# Patient Record
Sex: MALE | Race: WHITE | NOT HISPANIC OR LATINO | Employment: UNEMPLOYED | URBAN - METROPOLITAN AREA
[De-identification: names, ages, dates, MRNs, and addresses within clinical notes are randomized per-mention and may not be internally consistent; named-entity substitution may affect disease eponyms.]

---

## 2017-02-16 ENCOUNTER — HOSPITAL ENCOUNTER (EMERGENCY)
Facility: HOSPITAL | Age: 27
Discharge: HOME | End: 2017-02-16
Attending: EMERGENCY MEDICINE
Payer: MEDICAID

## 2017-02-16 VITALS
DIASTOLIC BLOOD PRESSURE: 76 MMHG | HEIGHT: 68 IN | SYSTOLIC BLOOD PRESSURE: 131 MMHG | TEMPERATURE: 98 F | OXYGEN SATURATION: 98 % | BODY MASS INDEX: 24.71 KG/M2 | HEART RATE: 69 BPM | WEIGHT: 163 LBS | RESPIRATION RATE: 16 BRPM

## 2017-02-16 PROCEDURE — 23655 CLTX SHO DSLC W/MNPJ W/ANES: CPT

## 2017-02-16 PROCEDURE — 63600175 PHARM REV CODE 636 W HCPCS: Performed by: EMERGENCY MEDICINE

## 2017-02-16 PROCEDURE — 99284 EMERGENCY DEPT VISIT MOD MDM: CPT | Mod: 25

## 2017-02-16 PROCEDURE — 96375 TX/PRO/DX INJ NEW DRUG ADDON: CPT

## 2017-02-16 PROCEDURE — 96374 THER/PROPH/DIAG INJ IV PUSH: CPT | Mod: 59

## 2017-02-16 PROCEDURE — 27000221 HC OXYGEN, UP TO 24 HOURS

## 2017-02-16 PROCEDURE — 94770 HC EXHALED C02 TEST: CPT

## 2017-02-16 PROCEDURE — 23650 CLTX SHO DSLC W/MNPJ WO ANES: CPT

## 2017-02-16 RX ORDER — HYDROCODONE BITARTRATE AND ACETAMINOPHEN 5; 325 MG/1; MG/1
1-2 TABLET ORAL EVERY 4 HOURS PRN
Qty: 20 TABLET | Refills: 0 | Status: SHIPPED | OUTPATIENT
Start: 2017-02-16 | End: 2017-02-26

## 2017-02-16 RX ORDER — PROPOFOL 10 MG/ML
75 VIAL (ML) INTRAVENOUS ONCE
Status: COMPLETED | OUTPATIENT
Start: 2017-02-16 | End: 2017-02-16

## 2017-02-16 RX ORDER — MORPHINE SULFATE 10 MG/ML
10 INJECTION INTRAMUSCULAR; INTRAVENOUS; SUBCUTANEOUS
Status: COMPLETED | OUTPATIENT
Start: 2017-02-16 | End: 2017-02-16

## 2017-02-16 RX ADMIN — MORPHINE SULFATE 10 MG: 10 INJECTION, SOLUTION INTRAMUSCULAR; INTRAVENOUS at 07:02

## 2017-02-16 RX ADMIN — PROPOFOL 75 MG: 10 INJECTION, EMULSION INTRAVENOUS at 07:02

## 2017-02-16 NOTE — ED AVS SNAPSHOT
[facility] [address] [place] [address]               Kwan Palacio   2017  7:01 PM   ED    Description:  Male : 1990   Department:  [facility]           Your Care was Coordinated By:     Provider Role From To    Kt Celis III, MD Attending Provider [id number] --      Reason for Visit     Dislocation           Diagnoses this Visit        Comments    Dislocation    -  Primary       ED Disposition     None           To Do List           Follow-up Information     Follow up with Jasmeet Sierra MD In 4 days.    Specialty:  Orthopedic Surgery    Contact information:    [address]  [place] [address]  [phone]         These Medications        Disp Refills Start End    hydrocodone-acetaminophen 5-325mg (NORCO) 5-325 mg per tablet 20 tablet 0 2017    Take 1-2 tablets by mouth every 4 (four) hours as needed for Pain. - Oral      [facility]     [facility] Nurse Care [facility] -  Assistance  Registered nurses in the [facility] provide clinical advisement, health education, appointment booking, and other advisory services.  Call for this free service at [phone].             Medications           Message regarding Medications     Verify the changes and/or additions to your medication regime listed below are the same as discussed with your clinician today.  If any of these changes or additions are incorrect, please notify your healthcare provider.        START taking these NEW medications        Refills    hydrocodone-acetaminophen 5-325mg (NORCO) 5-325 mg per tablet 0    Sig: Take 1-2 tablets by mouth every 4 (four) hours as needed for Pain.    Class: Print    Route: Oral      These medications were administered today        Dose Freq    morphine injection 10 mg 10 mg ED 1 Time    Sig: Inject 1 mL (10 mg total) into the vein ED 1 Time.    Class: Normal    Route:  "Intravenous    propofol (DIPRIVAN) 10 mg/mL IVP 75 mg Once    Sig: Inject 7.5 mLs (75 mg total) into the vein once.    Class: Normal    Route: Intravenous           Verify that the below list of medications is an accurate representation of the medications you are currently taking.  If none reported, the list may be blank. If incorrect, please contact your healthcare provider. Carry this list with you in case of emergency.           Current Medications     hydrocodone-acetaminophen 5-325mg (NORCO) 5-325 mg per tablet Take 1-2 tablets by mouth every 4 (four) hours as needed for Pain.           Clinical Reference Information           Your Vitals Were     BP Pulse Temp Resp Height Weight    145/67 (BP Location: Right arm, Patient Position: Sitting) 93 98.1 °F (36.7 °C) (Oral) 16 5' 8" (1.727 m) 73.9 kg (163 lb)    SpO2 BMI             100% 24.78 kg/m2         Allergies as of 2/16/2017     No Known Allergies      Immunizations Administered on Date of Encounter - 2/16/2017     None      ED Micro, Lab, POCT     None      ED Imaging Orders     Start Ordered       Status Ordering Provider    02/16/17 2005 02/16/17 2004  X-Ray Shoulder 2 or More Views Left  1 time imaging      Ordered     02/16/17 1916 02/16/17 1916  X-Ray Shoulder 2 or More Views Left  1 time imaging      In process       Discharge References/Attachments     DISLOCATION: SHOULDER (REDUCED) (ENGLISH)       [facility] complies with applicable Federal civil rights laws and does not discriminate on the basis of race, color, national origin, age, disability, or sex.        Language Assistance Services     ATTENTION: Language assistance services are available, free of charge. Please call [phone].      ATENCIÓN: Si habla español, tiene a leung disposición servicios gratuitos de asistencia lingüística. Llame al [phone].     CHÚ Ý: N?u b?n nói Ti?ng Vi?t, có các d?ch v? h? tr? ngôn ng? mi?n phí dành cho b?n. G?i s? [phone].      "

## 2017-02-17 NOTE — ED PROVIDER NOTES
"3Encounter Date: 2/16/2017    SCRIBE #1 NOTE: I, Barbara Nielsen , am scribing for, and in the presence of,  Dr. Celis. I have scribed the entire note.       History     Chief Complaint   Patient presents with    Dislocation     pt was wrestling and dislocated left shoulder; hx of dislocation     Review of patient's allergies indicates:  No Known Allergies  HPI Comments:     02/16/2017  7:15 PM     Chief Complaint: Possible L shoulder dislocation       The patient is a 26 y.o. Male with a PMHx of L should pain who is presenting with the acute onset of L shoulder injury that occurred just PTA. The pt reports that he was "wrestling with his girlfriend" and she "accidentally pulled it right out of the socket". He states that the effected arm is extremely tender to touch, has limited ROM, and is "not able to be moved". No decrease in sensation. No other comments or concerns. Pt confirms hx of dislocated L shoulder. No past surgical hx on file. Pt has hx if drug abuse including opiates and amphetamines.             The history is provided by the patient and medical records.     Past Medical History   Diagnosis Date    Dislocation of shoulder, left, closed      No past medical history pertinent negatives.  History reviewed. No pertinent past surgical history.  History reviewed. No pertinent family history.  Social History   Substance Use Topics    Smoking status: Never Smoker    Smokeless tobacco: None    Alcohol use Yes      Comment: occas     Review of Systems   Constitutional: Negative for fever.   HENT: Negative for sore throat.    Eyes: Negative for visual disturbance.   Respiratory: Negative for shortness of breath.    Cardiovascular: Negative for chest pain.   Gastrointestinal: Negative for nausea.   Genitourinary: Negative for dysuria.   Musculoskeletal: Positive for arthralgias (L shoulder pain). Negative for back pain.   Skin: Negative for rash.   Neurological: Negative for weakness.   Hematological: Does " not bruise/bleed easily.   Psychiatric/Behavioral: Negative for confusion.       Physical Exam   Initial Vitals   BP Pulse Resp Temp SpO2   02/16/17 1851 02/16/17 1851 02/16/17 1851 02/16/17 1851 02/16/17 1851   145/67 93 16 98.1 °F (36.7 °C) 97 %     Physical Exam    Nursing note and vitals reviewed.  Constitutional: He appears well-developed and well-nourished.   HENT:   Head: Normocephalic and atraumatic.   Mouth/Throat: Oropharynx is clear and moist.   Eyes: EOM are normal. Pupils are equal, round, and reactive to light.   Neck: Normal range of motion. Neck supple.   Cardiovascular: Normal rate, regular rhythm, normal heart sounds and intact distal pulses. Exam reveals no gallop and no friction rub.    No murmur heard.  2+ radial pulses to the L side    Pulmonary/Chest: Breath sounds normal. He has no wheezes. He has no rhonchi. He has no rales. He exhibits no tenderness.   Musculoskeletal: He exhibits tenderness. He exhibits no edema.   Limited ROM secondary to pain. L shoulder is TTP and the pt is holding the arm in 80 degrees abduction. No fullness or depression noted. No swelling or ecchymosis.      Lymphadenopathy:     He has no cervical adenopathy.   Neurological: He is alert and oriented to person, place, and time. No sensory deficit.   Normal light touch sensation noted to L arm.    Skin: Skin is warm and dry.   Capillary refill is less than 3 seconds.    Psychiatric: He has a normal mood and affect.         ED Course   Orthopedic Injury  Date/Time: 2/17/2017 3:05 AM  Authorized by: KIMBERLY GENTILE III   Performed by: KIMBERLY GENTILE III  Injury location: shoulder  Location details: left shoulder  Injury type: dislocation  Dislocation type: posterior  Chronicity: recurrent  Pre-procedure distal perfusion: normal  Pre-procedure neurological function: normal  Pre-procedure neurovascular assessment: neurovascularly intact  Pre-procedure range of motion: reduced  Sedation:  Patient sedated:  yes  Sedatives: propofol  Analgesia: morphine    Manipulation performed: yes  Reduction method: traction and counter traction  Reduction successful: yes  X-ray confirmed reduction: yes  Immobilization: sling  Post-procedure neurovascular assessment: post-procedure neurovascularly intact  Post-procedure distal perfusion: normal  Post-procedure neurological function: normal  Post-procedure range of motion: normal  Patient tolerance: Patient tolerated the procedure well with no immediate complications        Labs Reviewed - No data to display          Medical Decision Making:   ED Management:  Kwan Palacio is a 26 y.o. male with a history of recurrent shoulder dislocations presents with abrupt onset of left shoulder pain with limited range of motion after wrestling.  X-rays independently interpreted by me demonstrate a dislocation.  Under deep sedation with propofol and analgesia with morphine shoulder successfully reduced.  Postreduction x-rays independently interpreted by me demonstrate a successfully reduce shoulder.  He is placed in a sling and given hydrocodone for pain with referral to orthopedic surgery.            Scribe Attestation:   Scribe #1: I performed the above scribed service and the documentation accurately describes the services I performed. I attest to the accuracy of the note.    Attending Attestation:           Physician Attestation for Scribe:  Physician Attestation Statement for Scribe #1: I, Dr. Celis , reviewed documentation, as scribed by Barbara Nielsen  in my presence, and it is both accurate and complete.                 ED Course     Clinical Impression:   There were no encounter diagnoses.          Kt Celis III, MD  02/17/17 0306

## 2017-02-17 NOTE — ED NOTES
Pt awake and talking.  Very appropriately interactive with staff.  VSWNL.  Will continue to monitor

## 2017-02-17 NOTE — PROGRESS NOTES
Pt has ETCO2 monitoring device in usage. NC-2L was in use.  Dr. Celis at bedside for conscious sedation procedure.  Ambu bag and mask at \[facility]\"" on standby.

## 2017-02-17 NOTE — ED NOTES
Pt awake and oriented x4.  Friend remains at bedside.  ERP confirms shoulder placement.  Shoulder immobilizer in place.  Pt instructed on use and care.  ERP at bedside

## 2017-03-25 ENCOUNTER — HOSPITAL ENCOUNTER (EMERGENCY)
Facility: HOSPITAL | Age: 27
Discharge: HOME | End: 2017-03-25
Attending: EMERGENCY MEDICINE
Payer: MEDICAID

## 2017-03-25 VITALS
HEART RATE: 86 BPM | TEMPERATURE: 98 F | BODY MASS INDEX: 24.71 KG/M2 | RESPIRATION RATE: 16 BRPM | WEIGHT: 163 LBS | OXYGEN SATURATION: 99 % | DIASTOLIC BLOOD PRESSURE: 65 MMHG | SYSTOLIC BLOOD PRESSURE: 139 MMHG | HEIGHT: 68 IN

## 2017-03-25 DIAGNOSIS — M24.412 RECURRENT SHOULDER DISLOCATION, LEFT: Primary | ICD-10-CM

## 2017-03-25 PROCEDURE — 27000221 HC OXYGEN, UP TO 24 HOURS

## 2017-03-25 PROCEDURE — 99284 EMERGENCY DEPT VISIT MOD MDM: CPT | Mod: 25

## 2017-03-25 PROCEDURE — 96374 THER/PROPH/DIAG INJ IV PUSH: CPT

## 2017-03-25 PROCEDURE — 94770 HC EXHALED C02 TEST: CPT | Mod: 59

## 2017-03-25 PROCEDURE — 63600175 PHARM REV CODE 636 W HCPCS: Performed by: EMERGENCY MEDICINE

## 2017-03-25 PROCEDURE — 96375 TX/PRO/DX INJ NEW DRUG ADDON: CPT

## 2017-03-25 PROCEDURE — 23650 CLTX SHO DSLC W/MNPJ WO ANES: CPT

## 2017-03-25 PROCEDURE — 23655 CLTX SHO DSLC W/MNPJ W/ANES: CPT

## 2017-03-25 RX ORDER — KETOROLAC TROMETHAMINE 30 MG/ML
30 INJECTION, SOLUTION INTRAMUSCULAR; INTRAVENOUS
Status: COMPLETED | OUTPATIENT
Start: 2017-03-25 | End: 2017-03-25

## 2017-03-25 RX ORDER — FENTANYL CITRATE 50 UG/ML
50 INJECTION, SOLUTION INTRAMUSCULAR; INTRAVENOUS
Status: COMPLETED | OUTPATIENT
Start: 2017-03-25 | End: 2017-03-25

## 2017-03-25 RX ORDER — PROPOFOL 10 MG/ML
100 VIAL (ML) INTRAVENOUS ONCE
Status: COMPLETED | OUTPATIENT
Start: 2017-03-25 | End: 2017-03-25

## 2017-03-25 RX ORDER — MELOXICAM 7.5 MG/1
TABLET ORAL
Qty: 20 TABLET | Refills: 0 | Status: ON HOLD | OUTPATIENT
Start: 2017-03-25 | End: 2017-08-08 | Stop reason: HOSPADM

## 2017-03-25 RX ADMIN — PROPOFOL 100 MG: 10 INJECTION, EMULSION INTRAVENOUS at 08:03

## 2017-03-25 RX ADMIN — KETOROLAC TROMETHAMINE 30 MG: 30 INJECTION, SOLUTION INTRAMUSCULAR at 07:03

## 2017-03-25 RX ADMIN — FENTANYL CITRATE 50 MCG: 50 INJECTION, SOLUTION INTRAMUSCULAR; INTRAVENOUS at 07:03

## 2017-03-25 NOTE — ED AVS SNAPSHOT
[facility] [address]  [place] [address]               Kwan Mayfieldk   3/25/2017  7:06 AM   ED    Description:  Male : 1990   Department:  [facility]           Your Care was Coordinated By:     Provider Role From To    Sulaiman Damian MD Attending Provider 17 [phone] --      Reason for Visit     Shoulder Pain           Diagnoses this Visit        Comments    Recurrent shoulder dislocation, left    -  Primary       ED Disposition     None           To Do List           Follow-up Information     Please follow up.    Why:  Follow-up with your orthopedist next week.       These Medications        Disp Refills Start End    meloxicam (MOBIC) 7.5 MG tablet 20 tablet 0 3/25/2017     1-2 tablets once daily for pain      [facility]     [facility] Care [facility] -  Assistance  Registered nurses in the [facility] provide clinical advisement, health education, appointment booking, and other advisory services.  Call for this free service at [phone].             Medications           Message regarding Medications     Verify the changes and/or additions to your medication regime listed below are the same as discussed with your clinician today.  If any of these changes or additions are incorrect, please notify your healthcare provider.        START taking these NEW medications        Refills    meloxicam (MOBIC) 7.5 MG tablet 0    Si-2 tablets once daily for pain    Class: Print      These medications were administered today        Dose Freq    propofol (DIPRIVAN) 10 mg/mL  mg Once    Sig: Inject 10 mLs (100 mg total) into the vein once.    Class: Normal    Route: Intravenous    ketorolac injection 30 mg 30 mg ED 1 Time    Sig: Inject 30 mg into the vein ED 1 Time.    Class: Normal    Route: Intravenous    fentaNYL 50 mcg/mL injection 50 mcg 50 mcg ED 1 Time    Sig: Inject 1 mL (50 mcg total) into  "the vein ED 1 Time.    Class: Normal    Route: Intravenous           Verify that the below list of medications is an accurate representation of the medications you are currently taking.  If none reported, the list may be blank. If incorrect, please contact your healthcare provider. Carry this list with you in case of emergency.           Current Medications     meloxicam (MOBIC) 7.5 MG tablet 1-2 tablets once daily for pain           Clinical Reference Information           Your Vitals Were     BP Pulse Temp Resp Height Weight    142/67 95 97.5 °F (36.4 °C) (Oral) 14 5' 8" (1.727 m) 73.9 kg (163 lb)    SpO2 BMI             100% 24.78 kg/m2         Allergies as of 3/25/2017     No Known Allergies      Immunizations Administered on Date of Encounter - 3/25/2017     None      ED Micro, Lab, POCT     None      ED Imaging Orders     Start Ordered       Status Ordering Provider    03/25/17 0710 03/25/17 0709  X-Ray Shoulder 2 or More Views Left  1 time imaging      Final result       Discharge References/Attachments     DISLOCATION: SHOULDER (REDUCED) (ENGLISH)    SEDATION, PROCEDURAL (ADULT) (ENGLISH)       [facility] complies with applicable Federal civil rights laws and does not discriminate on the basis of race, color, national origin, age, disability, or sex.        Language Assistance Services     ATTENTION: Language assistance services are available, free of charge. Please call [phone].      ATENCIÓN: Si habla español, tiene a leung disposición servicios gratuitos de asistencia lingüística. Llame al [phone].     CHÚ Ý: N?u b?n nói Ti?ng Vi?t, có các d?ch v? h? tr? ngôn ng? mi?n phí dành cho b?n. G?i s? [phone].        "

## 2017-03-25 NOTE — ED PROVIDER NOTES
"Encounter Date: 3/25/2017       History     Chief Complaint   Patient presents with    Shoulder Pain     States L shoulder dislocation when wrestling with wife this am.     Review of patient's allergies indicates:  No Known Allergies  HPI Comments: Pt is a 25 y/o male who presents to the ER for evaluation left shoulder pain that occurred while he was wrestling with his girlfriend just prior to arrival.  Patient is unable to move his shoulder but is able to wiggle his fingers and move his elbow.  He has no complaints of headache neck pain or any other issues.  Interestingly this is the second time this has happened to this unfortunate gentleman over the past 2 months while "wrestling with his girlfriend".  Patient does have a prior history of opiate abuse and I saw him approximately 2-3 years ago when he presented to the emergency room under the influence and was arrested by his .     Past Medical History:   Diagnosis Date    Dislocation of shoulder, left, closed      History reviewed. No pertinent surgical history.  History reviewed. No pertinent family history.  Social History   Substance Use Topics    Smoking status: Never Smoker    Smokeless tobacco: None    Alcohol use Yes      Comment: occas     Review of Systems   Constitutional: Negative for fever.   Respiratory: Negative for shortness of breath.    Cardiovascular: Negative for chest pain.   Gastrointestinal: Negative for abdominal pain.   Musculoskeletal: Positive for arthralgias and joint swelling.   Skin: Negative for pallor, rash and wound.   Neurological: Negative for weakness, numbness and headaches.   Psychiatric/Behavioral: Negative for agitation and confusion.       Physical Exam   Initial Vitals   BP Pulse Resp Temp SpO2   03/25/17 0707 03/25/17 0707 03/25/17 0707 03/25/17 0707 03/25/17 0707   167/90 126 18 97.5 °F (36.4 °C) 100 %     Physical Exam    Vitals reviewed.  Constitutional: He appears well-developed and well-nourished. " No distress.   HENT:   Head: Normocephalic and atraumatic.   Mouth/Throat: Oropharynx is clear and moist.   Eyes: Conjunctivae and EOM are normal. Pupils are equal, round, and reactive to light.   Cardiovascular: Normal rate, regular rhythm, normal heart sounds and intact distal pulses. Exam reveals no gallop and no friction rub.    No murmur heard.  Pulmonary/Chest: Breath sounds normal. He has no wheezes. He has no rhonchi. He has no rales.   Abdominal: Soft. There is no tenderness.   Musculoskeletal: He exhibits tenderness (over the left shoulder w/ obvious deformity of the left shoulder).   Neurological: He is alert and oriented to person, place, and time.   Sensation is intact to the hand and fingers         ED Course   Procedural Sedation  Date/Time: 3/25/2017 8:11 AM  Performed by: AAMIR NÚÑEZ  Authorized by: AAMIR NÚÑEZ   ASA Class: Class 1 - Heathy patient. No medical history.   Mallampati Score: Class 1 - Visualization of the soft palate, fauces, uvula, and anterior/posterior pillars.   Equipment: on cardiac monitor., on BP monitor., on CO2 monitor., on supplemental oxygen., suction available. and airway equipment available.   Sedation type: deep sedation  (See MAR for exact dosages of medications).  Sedatives: propofol  Vitals: Vital signs were monitored during sedation.    Orthopedic Injury  Date/Time: 3/25/2017 8:12 AM  Authorized by: AAMIR NÚÑEZ   Performed by: AAMIR NÚÑEZ  Consent Done: Not Needed  Injury location: shoulder  Location details: left shoulder  Injury type: dislocation  Dislocation type: anterior  Hill-Sachs deformity: no  Chronicity: recurrent  Pre-procedure distal perfusion: normal  Pre-procedure neurological function: normal  Pre-procedure neurovascular assessment: neurovascularly intact  Pre-procedure range of motion: reduced  Local anesthesia used: no    Anesthesia:  Local anesthesia used: no  Sedation:  Patient sedated: yes  Vitals: Vital signs were monitored  during sedation.    Manipulation performed: yes  Reduction method: Lukasz maneuver  Reduction successful: yes  X-ray confirmed reduction: yes  Immobilization: sling  Complications: No  Post-procedure neurovascular assessment: post-procedure neurovascularly intact  Post-procedure distal perfusion: normal  Post-procedure neurological function: normal  Post-procedure range of motion: normal        Labs Reviewed - No data to display          Medical Decision Making:   Patient's shoulder has been reduced.  He was placed in an immobilizer.  I will prescribe Motrin for pain.  He needs to follow-up with orthopedist.                   ED Course     Clinical Impression:   The encounter diagnosis was Recurrent shoulder dislocation, left.          Sulaiman Damian MD  03/25/17 0916

## 2017-03-25 NOTE — ED NOTES
States dislocated L shoulder when wrestling with girlfriend. Able to move L hand and fingers, palp L radial pulse.

## 2017-03-25 NOTE — ED NOTES
L shoulder relocated per  without problems, vss. Awake , drowsy. Palp L radial pulse, w&d, color good. Sling and swath applied.

## 2017-07-19 ENCOUNTER — HOSPITAL ENCOUNTER (INPATIENT)
Facility: HOSPITAL | Age: 27
LOS: 20 days | Discharge: NURSING, REHAB OR LONG-TERM CARE | DRG: 220 | End: 2017-08-08
Attending: INTERNAL MEDICINE | Admitting: INTERNAL MEDICINE
Payer: MEDICAID

## 2017-07-19 DIAGNOSIS — Z95.2 S/P AVR (AORTIC VALVE REPLACEMENT): ICD-10-CM

## 2017-07-19 DIAGNOSIS — I44.0 AV BLOCK, 1ST DEGREE: ICD-10-CM

## 2017-07-19 DIAGNOSIS — Z98.890 HISTORY OF OPEN HEART SURGERY: ICD-10-CM

## 2017-07-19 DIAGNOSIS — I49.9 ARRHYTHMIA: ICD-10-CM

## 2017-07-19 DIAGNOSIS — I33.0 ACUTE BACTERIAL ENDOCARDITIS: ICD-10-CM

## 2017-07-19 DIAGNOSIS — R73.9 STRESS HYPERGLYCEMIA: ICD-10-CM

## 2017-07-19 DIAGNOSIS — I33.0 SUBACUTE BACTERIAL ENDOCARDITIS: ICD-10-CM

## 2017-07-19 DIAGNOSIS — Z98.890 S/P MVR (MITRAL VALVE REPAIR): ICD-10-CM

## 2017-07-19 DIAGNOSIS — F19.10 IV DRUG ABUSE: ICD-10-CM

## 2017-07-19 DIAGNOSIS — I38 ENDOCARDITIS: ICD-10-CM

## 2017-07-19 DIAGNOSIS — R04.2 HEMOPTYSIS: ICD-10-CM

## 2017-07-19 PROCEDURE — 20000000 HC ICU ROOM

## 2017-07-20 PROBLEM — R04.2 HEMOPTYSIS: Status: ACTIVE | Noted: 2017-07-20

## 2017-07-20 LAB
ALBUMIN SERPL BCP-MCNC: 2.9 G/DL
ALBUMIN SERPL BCP-MCNC: 2.9 G/DL
ALP SERPL-CCNC: 84 U/L
ALP SERPL-CCNC: 84 U/L
ALT SERPL W/O P-5'-P-CCNC: 35 U/L
ALT SERPL W/O P-5'-P-CCNC: 35 U/L
ANION GAP SERPL CALC-SCNC: 11 MMOL/L
AORTIC VALVE REGURGITATION: ABNORMAL
APTT BLDCRRT: 23.2 SEC
AST SERPL-CCNC: 32 U/L
AST SERPL-CCNC: 32 U/L
BASOPHILS # BLD AUTO: 0.03 K/UL
BASOPHILS # BLD AUTO: 0.03 K/UL
BASOPHILS NFR BLD: 0.2 %
BASOPHILS NFR BLD: 0.2 %
BILIRUB SERPL-MCNC: 0.5 MG/DL
BILIRUB SERPL-MCNC: 0.5 MG/DL
BNP SERPL-MCNC: 143 PG/ML
BUN SERPL-MCNC: 16 MG/DL
CALCIUM SERPL-MCNC: 8.6 MG/DL
CHLORIDE SERPL-SCNC: 100 MMOL/L
CO2 SERPL-SCNC: 29 MMOL/L
CREAT SERPL-MCNC: 1.1 MG/DL
DIFFERENTIAL METHOD: ABNORMAL
DIFFERENTIAL METHOD: ABNORMAL
EOSINOPHIL # BLD AUTO: 0.1 K/UL
EOSINOPHIL # BLD AUTO: 0.1 K/UL
EOSINOPHIL NFR BLD: 0.8 %
EOSINOPHIL NFR BLD: 0.8 %
ERYTHROCYTE [DISTWIDTH] IN BLOOD BY AUTOMATED COUNT: 17.2 %
ERYTHROCYTE [DISTWIDTH] IN BLOOD BY AUTOMATED COUNT: 17.2 %
EST. GFR  (AFRICAN AMERICAN): >60 ML/MIN/1.73 M^2
EST. GFR  (NON AFRICAN AMERICAN): >60 ML/MIN/1.73 M^2
GLUCOSE SERPL-MCNC: 104 MG/DL
HCT VFR BLD AUTO: 33.4 %
HCT VFR BLD AUTO: 33.4 %
HGB BLD-MCNC: 11 G/DL
HGB BLD-MCNC: 11 G/DL
INR PPP: 1.1
LACTATE SERPL-SCNC: 1.6 MMOL/L
LYMPHOCYTES # BLD AUTO: 3 K/UL
LYMPHOCYTES # BLD AUTO: 3 K/UL
LYMPHOCYTES NFR BLD: 24.1 %
LYMPHOCYTES NFR BLD: 24.1 %
MAGNESIUM SERPL-MCNC: 1.8 MG/DL
MAGNESIUM SERPL-MCNC: 1.8 MG/DL
MCH RBC QN AUTO: 28 PG
MCH RBC QN AUTO: 28 PG
MCHC RBC AUTO-ENTMCNC: 32.9 G/DL
MCHC RBC AUTO-ENTMCNC: 32.9 G/DL
MCV RBC AUTO: 85 FL
MCV RBC AUTO: 85 FL
MITRAL VALVE REGURGITATION: ABNORMAL
MONOCYTES # BLD AUTO: 0.7 K/UL
MONOCYTES # BLD AUTO: 0.7 K/UL
MONOCYTES NFR BLD: 5.3 %
MONOCYTES NFR BLD: 5.3 %
NEUTROPHILS # BLD AUTO: 8.6 K/UL
NEUTROPHILS # BLD AUTO: 8.6 K/UL
NEUTROPHILS NFR BLD: 69.3 %
NEUTROPHILS NFR BLD: 69.3 %
PHOSPHATE SERPL-MCNC: 4.7 MG/DL
PHOSPHATE SERPL-MCNC: 4.7 MG/DL
PLATELET # BLD AUTO: 293 K/UL
PLATELET # BLD AUTO: 293 K/UL
PMV BLD AUTO: 8.8 FL
PMV BLD AUTO: 8.8 FL
POTASSIUM SERPL-SCNC: 3.9 MMOL/L
PROT SERPL-MCNC: 6.5 G/DL
PROT SERPL-MCNC: 6.5 G/DL
PROTHROMBIN TIME: 11.4 SEC
RBC # BLD AUTO: 3.93 M/UL
RBC # BLD AUTO: 3.93 M/UL
RETIRED EF AND QEF - SEE NOTES: 60 (ref 55–65)
SODIUM SERPL-SCNC: 140 MMOL/L
VANCOMYCIN SERPL-MCNC: 21.3 UG/ML
WBC # BLD AUTO: 12.47 K/UL
WBC # BLD AUTO: 12.47 K/UL

## 2017-07-20 PROCEDURE — 99222 1ST HOSP IP/OBS MODERATE 55: CPT | Mod: ,,, | Performed by: INTERNAL MEDICINE

## 2017-07-20 PROCEDURE — 83605 ASSAY OF LACTIC ACID: CPT

## 2017-07-20 PROCEDURE — 80053 COMPREHEN METABOLIC PANEL: CPT

## 2017-07-20 PROCEDURE — 25000003 PHARM REV CODE 250: Performed by: INTERNAL MEDICINE

## 2017-07-20 PROCEDURE — 83735 ASSAY OF MAGNESIUM: CPT

## 2017-07-20 PROCEDURE — 87040 BLOOD CULTURE FOR BACTERIA: CPT

## 2017-07-20 PROCEDURE — 93010 ELECTROCARDIOGRAM REPORT: CPT | Mod: ,,, | Performed by: INTERNAL MEDICINE

## 2017-07-20 PROCEDURE — 99233 SBSQ HOSP IP/OBS HIGH 50: CPT | Mod: ,,, | Performed by: INTERNAL MEDICINE

## 2017-07-20 PROCEDURE — 93306 TTE W/DOPPLER COMPLETE: CPT | Mod: 26,,, | Performed by: INTERNAL MEDICINE

## 2017-07-20 PROCEDURE — 20000000 HC ICU ROOM

## 2017-07-20 PROCEDURE — 85730 THROMBOPLASTIN TIME PARTIAL: CPT

## 2017-07-20 PROCEDURE — 85025 COMPLETE CBC W/AUTO DIFF WBC: CPT

## 2017-07-20 PROCEDURE — 85610 PROTHROMBIN TIME: CPT

## 2017-07-20 PROCEDURE — 80202 ASSAY OF VANCOMYCIN: CPT

## 2017-07-20 PROCEDURE — 25000003 PHARM REV CODE 250: Performed by: STUDENT IN AN ORGANIZED HEALTH CARE EDUCATION/TRAINING PROGRAM

## 2017-07-20 PROCEDURE — 84100 ASSAY OF PHOSPHORUS: CPT

## 2017-07-20 PROCEDURE — 27000221 HC OXYGEN, UP TO 24 HOURS

## 2017-07-20 PROCEDURE — 93306 TTE W/DOPPLER COMPLETE: CPT

## 2017-07-20 PROCEDURE — 94761 N-INVAS EAR/PLS OXIMETRY MLT: CPT

## 2017-07-20 PROCEDURE — 83880 ASSAY OF NATRIURETIC PEPTIDE: CPT

## 2017-07-20 PROCEDURE — 63600175 PHARM REV CODE 636 W HCPCS: Performed by: INTERNAL MEDICINE

## 2017-07-20 RX ORDER — ALPRAZOLAM 0.5 MG/1
0.5 TABLET ORAL NIGHTLY PRN
Status: DISCONTINUED | OUTPATIENT
Start: 2017-07-20 | End: 2017-07-21

## 2017-07-20 RX ORDER — ALPRAZOLAM 0.5 MG/1
0.5 TABLET ORAL 2 TIMES DAILY PRN
Status: DISCONTINUED | OUTPATIENT
Start: 2017-07-20 | End: 2017-07-21

## 2017-07-20 RX ORDER — PROMETHAZINE HYDROCHLORIDE AND CODEINE PHOSPHATE 6.25; 1 MG/5ML; MG/5ML
5 SOLUTION ORAL EVERY 6 HOURS PRN
Status: DISCONTINUED | OUTPATIENT
Start: 2017-07-20 | End: 2017-07-20

## 2017-07-20 RX ORDER — IBUPROFEN 200 MG
1 TABLET ORAL DAILY
Status: DISCONTINUED | OUTPATIENT
Start: 2017-07-20 | End: 2017-07-21

## 2017-07-20 RX ORDER — HYDROCODONE BITARTRATE AND ACETAMINOPHEN 5; 325 MG/1; MG/1
1 TABLET ORAL EVERY 8 HOURS PRN
Status: DISCONTINUED | OUTPATIENT
Start: 2017-07-20 | End: 2017-07-21

## 2017-07-20 RX ORDER — HYDROCODONE BITARTRATE AND ACETAMINOPHEN 5; 325 MG/1; MG/1
1 TABLET ORAL ONCE
Status: COMPLETED | OUTPATIENT
Start: 2017-07-20 | End: 2017-07-20

## 2017-07-20 RX ORDER — ACETAMINOPHEN 325 MG/1
650 TABLET ORAL EVERY 6 HOURS PRN
Status: DISCONTINUED | OUTPATIENT
Start: 2017-07-20 | End: 2017-07-21

## 2017-07-20 RX ORDER — ONDANSETRON 2 MG/ML
4 INJECTION INTRAMUSCULAR; INTRAVENOUS EVERY 12 HOURS PRN
Status: DISCONTINUED | OUTPATIENT
Start: 2017-07-20 | End: 2017-07-21

## 2017-07-20 RX ORDER — FUROSEMIDE 10 MG/ML
20 INJECTION INTRAMUSCULAR; INTRAVENOUS 2 TIMES DAILY
Status: DISCONTINUED | OUTPATIENT
Start: 2017-07-20 | End: 2017-07-21

## 2017-07-20 RX ORDER — HEPARIN SODIUM 5000 [USP'U]/ML
5000 INJECTION, SOLUTION INTRAVENOUS; SUBCUTANEOUS EVERY 8 HOURS
Status: DISCONTINUED | OUTPATIENT
Start: 2017-07-20 | End: 2017-07-21

## 2017-07-20 RX ORDER — HYDROCODONE BITARTRATE AND ACETAMINOPHEN 5; 325 MG/1; MG/1
1 TABLET ORAL EVERY 8 HOURS PRN
Status: DISCONTINUED | OUTPATIENT
Start: 2017-07-20 | End: 2017-07-20

## 2017-07-20 RX ORDER — SODIUM CHLORIDE 0.9 % (FLUSH) 0.9 %
3 SYRINGE (ML) INJECTION EVERY 8 HOURS
Status: DISCONTINUED | OUTPATIENT
Start: 2017-07-20 | End: 2017-07-21

## 2017-07-20 RX ORDER — PANTOPRAZOLE SODIUM 40 MG/1
40 TABLET, DELAYED RELEASE ORAL DAILY
Status: DISCONTINUED | OUTPATIENT
Start: 2017-07-20 | End: 2017-07-21

## 2017-07-20 RX ADMIN — PROMETHAZINE HYDROCHLORIDE AND CODEINE PHOSPHATE 5 ML: 6.25; 1 SYRUP ORAL at 10:07

## 2017-07-20 RX ADMIN — FUROSEMIDE 20 MG: 10 INJECTION, SOLUTION INTRAVENOUS at 08:07

## 2017-07-20 RX ADMIN — NICOTINE 1 PATCH: 21 PATCH, EXTENDED RELEASE TRANSDERMAL at 08:07

## 2017-07-20 RX ADMIN — ACETAMINOPHEN 650 MG: 325 TABLET ORAL at 08:07

## 2017-07-20 RX ADMIN — Medication 3 ML: at 02:07

## 2017-07-20 RX ADMIN — CEFTRIAXONE 2 G: 2 INJECTION, SOLUTION INTRAVENOUS at 04:07

## 2017-07-20 RX ADMIN — Medication 3 ML: at 06:07

## 2017-07-20 RX ADMIN — Medication 5 ML: at 02:07

## 2017-07-20 RX ADMIN — HYDROCODONE BITARTRATE AND ACETAMINOPHEN 1 TABLET: 5; 325 TABLET ORAL at 03:07

## 2017-07-20 RX ADMIN — CEFTRIAXONE 2 G: 2 INJECTION, SOLUTION INTRAVENOUS at 03:07

## 2017-07-20 RX ADMIN — ALPRAZOLAM 0.5 MG: 0.5 TABLET ORAL at 09:07

## 2017-07-20 RX ADMIN — ALPRAZOLAM 0.5 MG: 0.5 TABLET ORAL at 02:07

## 2017-07-20 RX ADMIN — FUROSEMIDE 20 MG: 10 INJECTION, SOLUTION INTRAVENOUS at 05:07

## 2017-07-20 RX ADMIN — PROMETHAZINE HYDROCHLORIDE AND CODEINE PHOSPHATE 5 ML: 6.25; 1 SYRUP ORAL at 04:07

## 2017-07-20 RX ADMIN — PANTOPRAZOLE SODIUM 40 MG: 40 TABLET, DELAYED RELEASE ORAL at 08:07

## 2017-07-20 NOTE — ASSESSMENT & PLAN NOTE
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  Reports it was at its worst yesterday and has improved since.  He denies any leon blood clots though this is somewhat uncertain.  Differential is broad; we only know of aortic and mitral endocarditis - no known right sided disease - if that were the case then would have to worry about septic emboli.  Primary team concerned for pulmonary edema.  Could also be DAH from infective endocarditis vs ARDS in the setting of infection.    - His hemoptysis is improving - his CXR is impressive for patchy airspace disease; a CT may better characterize the parenchymal lesions  - Treatment for his underlying condition (Infective endocarditis) would be the primary treatment for several possible etiologies on the differential - being evaluated for surgical valve replacement  - Checking a BNP, diuresis may help resolve his hemoptysis as well  - If it progresses or fails to improve; CTA to localize the bleed site for IR embolization is not unreasonable - we may also consider bronchoscopy at that time to help confirm etiology of hemoptysis

## 2017-07-20 NOTE — PROGRESS NOTES
Pt experiencing runs of SVT on telemetry q10 min for 1 hour. BP stable, HR ~120, runs last for < 5 seconds, pt asymptomatic. Dr Lam notified.

## 2017-07-20 NOTE — HPI
26yoM who presents from OSH with strep angionosis endocarditis (aortic and mitral).  Reports that he had ~2 weeks of sx prior to being admitted.  Complains of a persistent cough and pain in his chest and ribs with coughing.  States that he is bringing up sputum that is yellow and mixed with blood for the past 2 days.  Denies any leon blood clots.  States he continues to feel unwell because of the pain all over his body.  No nausea, vomiting.  Still feels like his breathing is somewhat shallow.

## 2017-07-20 NOTE — PROGRESS NOTES
Pt arrived to [facility] room 3089 transfer from [facility] via EMS, VSS, on 2L O2 n.c, Cards MD at bedside. No skin breakdown noted, multiple tattoos and piercings. Lab work sent, blood cultures sent, EKG 12 lead done, WCTM and follow POC

## 2017-07-20 NOTE — PLAN OF CARE
07/20/17 1610   Discharge Assessment   Assessment Type Discharge Planning Assessment   Assessment information obtained from? Medical Record   Expected Length of Stay (days) 5   Communicated expected length of stay with patient/caregiver yes   Prior to hospitilization cognitive status: Alert/Oriented   Prior to hospitalization functional status: Independent   Current cognitive status: Alert/Oriented   Current Functional Status: Independent   Arrived From [facility] hospital  (Transferred for OSH for CTs eval for endocarditis)   Lives With significant other   Able to Return to Prior Arrangements unable to determine at this time (comments)   Is patient able to care for self after discharge? Unable to determine at this time (comments)   How many people do you have in your home that can help with your care after discharge? 1   Who are your caregiver(s) and their phone number(s)? Dena Sylvester [phone] S.O.   Patient's perception of discharge disposition home health   Readmission Within The Last 30 Days no previous admission in last 30 days   Patient currently being followed by outpatient case management? No   Patient currently receives home health services? No   Does the patient currently use HME? No   Patient currently receives private duty nursing? No   Patient currently receives any other outside agency services? No   Equipment Currently Used at Home none   Do you have any problems affording any of your prescribed medications? TBD   Is the patient taking medications as prescribed? yes   Do you have any financial concerns preventing you from receiving the healthcare you need? (unknown)   Does the patient have transportation to healthcare appointments? Yes   Transportation Available car;family or friend will provide   On Dialysis? No   Does the patient receive services at the Coumadin Clinic? No   Are there any open cases? No   Discharge Plan A Home Health   Discharge Plan B Rehab   Patient/Family In Agreement  With Plan unable to assess     Patient is admitted for CTS eval for bacterial endocarditis. The patient may require long term IV ABX and PICC line to be place. H/O of drug use and takes methadone. May require eval from addiction [facility] prior to discharge to home with central line access. Will continue to follow for discharge needs. HH vs Inpatient Rehab vs SNF.

## 2017-07-20 NOTE — CONSULTS
[facility]  Infectious Disease  Consult Note    Patient Name: Kwan Palacio  MRN: [id number]  Admission Date: 7/19/2017  Hospital Length of Stay: 1 days  Attending Physician: Torey Gonzalez MD  Primary Care Provider: Primary Doctor No     Isolation Status: No active isolations    Inpatient consult to Infectious Diseases  Consult performed by: ATA CORNEJO  Consult ordered by: DICKSON HENRY          Subjective:     Principal Problem: Endocarditis    HPI: Pt is a 27 yo male with a recently diagnosed bicuspid aortic valve who presents to [facility] as a transfer from [facility] for streptococcus angionosis endocarditis. Symptoms began 2 months ago with fevers and malaise. Went to the ED multiple times before being admitted 2 weeks ago. Blood cultures from 07/04/2017 positive for streptococcus angionosis; TTE & DOUG demonstrated vegetations & newly diagnosed bicuspid aortic valve. Patient was treated with gentamicin & rocephin. Yesterday, while in hospital, the patient developed a productive cough with hemoptysis and transferred to our hospital for evaluation by CT surgery.    Today, pt complains of continued hemoptysis. He has generalized pain worst in his throat & chest, particularly his ribs; rib pain rated 8/10 and painful with movement & inspiration. He felt fevers/chills last night, no documented fever. He denies headaches, blurry vision, n/v, or rash. He has had a 30 lb wt loss over the last two months. Positive risk factors for endocarditis include a history of drug abuse (denies Iv), tattoos & congenital heart disease (bicuspid aortic valve, recently diagnosed at [facility] Denies history of immunesuppresion. CXR demonstrate parenchymal opacities consistent with pulmonary edema, BL PNA or pulmonary hemorrhage. 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 No leukocytosis, blood cultures NGTD.  Currently on rocephin.       Past Medical History:   Diagnosis Date    Dislocation of shoulder, left, closed        History reviewed. No pertinent surgical history.    Review of patient's allergies indicates:  No Known Allergies    Medications:  Prescriptions Prior to Admission   Medication Sig    meloxicam (MOBIC) 7.5 MG tablet 1-2 tablets once daily for pain     Antibiotics     Start     Stop Route Frequency Ordered    07/20/17 0345  cefTRIAXone (ROCEPHIN) 2 g in dextrose 5 % 50 mL IVPB      -- IV Every 12 hours (non-standard times) 07/20/17 0246        Antifungals     None        Antivirals     None             There is no immunization history on file for this patient.    Family History     None        Social History     Social History    Marital status: Significant Other     Spouse name: N/A    Number of children: N/A    Years of education: N/A     Social History Main Topics    Smoking status: Never Smoker    Smokeless tobacco: Current User     Types: Snuff    Alcohol use Yes      Comment: occas    Drug use:      Types: Methamphetamines, MDMA (Ecstacy), Benzodiazepines    Sexual activity: Yes     Partners: Female     Other Topics Concern    None     Social History Narrative    None     Review of Systems   Constitutional: Positive for chills and fever.   HENT: Positive for sore throat.    Eyes: Negative for visual disturbance.   Respiratory: Positive for cough (hemoptysis). Negative for shortness of breath.    Cardiovascular: Positive for chest pain (localized to ribs).   Gastrointestinal: Positive for abdominal pain. Negative for diarrhea, nausea and vomiting.   Genitourinary: Negative for difficulty urinating and dysuria.   Musculoskeletal:        Generalized aches & pain   Skin: Negative for color change and rash.   Neurological: Negative for headaches.     Objective:     Vital Signs (Most Recent):  Temp: 98.5 °F (36.9 °C) (07/20/17  1100)  Pulse: 96 (07/20/17 1100)  Resp: (!) 26 (07/20/17 1100)  BP: (!) 117/57 (07/20/17 1100)  SpO2: 100 % (07/20/17 1100) Vital Signs (24h Range):  Temp:  [98.5 °F (36.9 °C)-99.1 °F (37.3 °C)] 98.5 °F (36.9 °C)  Pulse:  [84-99] 96  Resp:  [16-40] 26  SpO2:  [92 %-100 %] 100 %  BP: ()/(51-66) 117/57     Weight: 68.3 kg (150 lb 9.2 oz)  Body mass index is 22.89 kg/m².    Estimated Creatinine Clearance: 99.2 mL/min (based on Cr of 1.1).    Physical Exam   Constitutional: He is oriented to person, place, and time. He appears distressed (in pain).   Thin-appearing   HENT:   Head: Normocephalic.   Eyes:   Negative woodson spots   Cardiovascular: Regular rhythm.    Murmur (systolic) heard.  HR 96   Pulmonary/Chest: Effort normal.   Decreased breath sounds   Abdominal: There is tenderness (lower abdomen, worse in LLQ).   splenomegaly   Musculoskeletal: He exhibits no edema or tenderness.   Neurological: He is alert and oriented to person, place, and time.   Skin: Skin is warm and dry. He is not diaphoretic.   Psychiatric: He has a normal mood and affect. His behavior is normal.       Significant Labs:   Blood Culture:   Recent Labs  Lab 07/20/17  0130 07/20/17  0200   LABBLOO No Growth to date No Growth to date     CBC:   Recent Labs  Lab 07/20/17  0100   WBC 12.47  12.47   HGB 11.0*  11.0*   HCT 33.4*  33.4*     293     CMP:   Recent Labs  Lab 07/20/17  0100     140  140   K 3.9  3.9  3.9     100  100   CO2 29  29  29     104  104   BUN 16  16  16   CREATININE 1.1  1.1  1.1   CALCIUM 8.6*  8.6*  8.6*   PROT 6.5  6.5   ALBUMIN 2.9*  2.9*   BILITOT 0.5  0.5   ALKPHOS 84  84   AST 32  32   ALT 35  35   ANIONGAP 11  11  11   EGFRNONAA >60.0  >60.0  >60.0       Significant Imaging: I have reviewed all pertinent imaging results/findings within the past 24 hours.      Assessment/Plan:     * Endocarditis    27 yo male with streptococcus anginosis endocarditis likely  complicated by heart failure  -con't IV ceftriaxone 2g Q12H  -Blood cultures NGTD  -pending CT chest & abdomen-will follow  -follow up with CT surgery recs (waiting for DOUG from [facility])          Thank you for your consult.    Nora Hess MD  Infectious Disease  [facility]

## 2017-07-20 NOTE — ASSESSMENT & PLAN NOTE
Treatment per primary team and  ID recommendations w/ evaluation for source control with surgical valve replacement and abscess drainagae

## 2017-07-20 NOTE — H&P
"      Cardiology H&P    Chief complaint: transfer from OSH     HPI:   Mr Palacio is a 26 year old male who was transferred in from OSH for surgical evaluation for endocarditis. He states he has felt poorly for the last 2 months with fevers and generalized malaise. He went to the ED several times and was told he had a respiratory virus. He then was finally admitted about 2 weeks ago. Today he states he was transferred because he had been "coughing up blood." He was transferred with limited records but the nurse did call the OSH to find out he has been on micafungin, Vanc, Gentamicin, and Rocephin abx therapy. In addition he had been getting a cough suppressant and prn xanax for anxiety. He also had a DOUG, but we did have a copy of the study or report. He states that his only complaint currently is that he has a sore throat from coughing.    He states his only home medication is methadone.     The patient states he does have some type of congenital cardiac defect that was discovered recently. Again, I have record of this. He states it is a "missing valve."    ROS:    Constitution: Negative for fever or chills. Negative for weight loss or gain.   HENT: Positive for throat pain  Eyes: Negative for blurred or double vision.   Cardiovascular: See above  Pulmonary: Negative for SOB. Positive for cough.   Gastrointestinal: Negative for abdominal pain. Negative for nausea/vomiting. Negative for diarrhea.   : Negative for dysuria.   Neurological: Negative for focal weakness or sensory changes.  PMH:     Past Medical History:   Diagnosis Date    Dislocation of shoulder, left, closed      History reviewed. No pertinent surgical history.  Allergies:   Review of patient's allergies indicates:  No Known Allergies    Medications:     PTA Medications   Medication Sig    meloxicam (MOBIC) 7.5 MG tablet 1-2 tablets once daily for pain       Inpatient Medications   Continuous Infusions:   Scheduled Meds:   cefTRIAXone (ROCEPHIN) IVPB "  2 g Intravenous Q12H    furosemide  20 mg Intravenous BID    heparin (porcine)  5,000 Units Subcutaneous Q8H    nicotine  1 patch Transdermal Daily    pantoprazole  40 mg Oral Daily    sodium chloride 0.9%  3 mL Intravenous Q8H     PRN Meds:(Magic mouthwash) 1:1:1 Benadryl 12.5mg/5ml liq, aluminum & magnesium hydroxide-simehticone (Maalox), lidocaine viscous 2%, alprazolam, alprazolam, ondansetron, promethazine-codeine 6.25-10 mg/5 ml     Social History:     Social History   Substance Use Topics    Smoking status: Never Smoker    Smokeless tobacco: Current User     Types: Snuff    Alcohol use Yes      Comment: occas   admits to drug usage: meth, ectasy but never any IVDA - review of old records shows that he has admitted to injecting heroin in the past.     Family History:   History reviewed. No pertinent family history.  Physical Exam:     Vitals:  Temp:  [98.9 °F (37.2 °C)-99.1 °F (37.3 °C)]   Pulse:  [84-96]   Resp:  [16-40]   BP: ()/(51-66)   SpO2:  [92 %-100 %]  I/O's:    Intake/Output Summary (Last 24 hours) at 07/20/17 0744  Last data filed at 07/20/17 0149   Gross per 24 hour   Intake                0 ml   Output              460 ml   Net             -460 ml        Constitutional: NAD  HEENT: Sclera anicteric, PERRLA, EOMI  Neck: No JVD, no carotid bruits  CV: RRR, holosystolic murmur, normal S1/S2  Pulm: CTAB, no wheezes, rales, or ronchi  GI: Abdomen soft, NTND, +BS  Extremities: No LE edema  Skin: No ecchymosis, erythema, or ulcers  Psych: AOx3, appropriate affect  Neuro: CNII-XII intact, no focal deficits    Labs:       Recent Labs  Lab 07/20/17  0100     140  140   K 3.9  3.9  3.9     100  100   CO2 29  29  29   BUN 16  16  16   CREATININE 1.1  1.1  1.1   ANIONGAP 11  11  11       Recent Labs  Lab 07/20/17  0100   AST 32  32   ALT 35  35   ALKPHOS 84  84   BILITOT 0.5  0.5   ALBUMIN 2.9*  2.9*       Recent Labs  Lab 07/20/17  0100   CALCIUM 8.6*  8.6*   8.6*   MG 1.8  1.8   PHOS 4.7*  4.7*     No results for input(s): TROPONINI, BNP in the last 168 hours.  No results for input(s): NITRITE, LEUKOCYTESUR, WBCUA, BACTERIA in the last 168 hours.    Invalid input(s): EPITHELIALCE, COGRCA    Estimated Creatinine Clearance: 99.2 mL/min (based on Cr of 1.1).   Recent Labs  Lab 07/20/17  0100   WBC 12.47  12.47   HGB 11.0*  11.0*   HCT 33.4*  33.4*     293   GRAN 69.3  69.3  8.6*  8.6*       Recent Labs  Lab 07/20/17  0100   INR 1.1       Recent Labs  Lab 07/20/17  0100   LACTATE 1.6     No results found for: CHOL, HDL, LDLCALC, TRIG  No results found for: HGBA1C  No results found for: TSH, T5GGLTT       Micro: No culture data  Blood Cultures  No results found for: LABBLOO  Urine Cultures  No results found for: LABURIN  Sputum Cultures  No components found for: LOWERRESPIRA  Imaging:   CXR: pending    No results found for: EF  Assessment and Plan:   Bacterial endocarditis  Strep angiosis bacteremia  Drug abuse    Plan:  - ordered blood cultures (will likely not show anything given his been on abx)  - continued Rocephin and ordered random Vanc level. Will hold gent and Micafungin at this time pending ID consult  - consult cardiothoracic surgery  - attempt to obtain records from OSH to better understand prior imaging and treatment  - ordered TTE, will likely need DOUG at some point  - ordered cxr given hx of cough  - prn magic mouthwash for throat pain  - continued most other medications from med list obtained from nurse.    Santiago Rao MD, PGY- IV  Cardiology Fellow  Pager : [phone]  7/20/2017 7:44 AM

## 2017-07-20 NOTE — MEDICAL/APP STUDENT
"General Infectious Diseases: Consult Note    Consult Requested By: Torey Gonzalez MD    Reason for Consult: Suspected Bacterial Endocarditis      IMPRESSION:    25yo male with hx of drug abuse presenting from outside hospital with suspected sub-acute bacterial endocarditis and confirmed Strep anginosus bacteremia.  WBC stable at 12.47. Afebrile.     PLAN:  - Blood cx redrawn on 7/20/17. Will f/u.  - CXR f/u with CT chest. Will add CT abdo to r/o possible other sources of bacterial seeding.   - Will continue Ceftriaxone. No other additional agents recommended at this time.  - Will f/u records from [facility]       Thanks for the consult. ID will continue to follow.     Dena Heath MS 4    SUBJECTIVE:     History of Present Illness:  Patient is a 26 y.o. male with a hx of drug abuse transferred from [facility] on 6/19/17 for CTS eval. Patient has 2 mo hx of fevers, chills, and malaise. Mr. Palacio presented to the ED several times during these two months and states he was told he had "a cold" each time. Pt denies N/V. Endorses night sweats and 30lb weight loss over 2 months. Admitted to [facility] 2 weeks ago. Had first and only episode of hemoptysis yesterday. States he had a DOUG in which they discovered a "missing valve" but has no copy of report or records.    Admission records limited. Nurse called and was told pt was on Micafungin, Vanc, Gent, and Rocephin. Positive blood cx for Strep anginosus. Sensitivities include Amp, Ceftriaxone, Clinda, Penicillin, and Vanc. Resistant to erythromycin. Also on cough suppressent and Xanax for anxiety.    Pt admits to meth and ecstasy use but denies any IVDU. Pt states he had "bad batch of dope" around the time his symptoms began. He denies any drug use since this time.     [facility] stopped Vanc, Gent, and Micafungin. Random vanc level drawn.     Past Medical History:  Past Medical History:   Diagnosis Date    " Dislocation of shoulder, left, closed        Past Surgical History:  Sinus sx at age 13    Family History:  Pt states family all have heart issues, but is unsure of specifics.     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     Allergies:  Review of patient's allergies indicates:  No Known Allergies     Pertinent Medications:  Antibiotics     Start     Stop Route Frequency Ordered    07/20/17 0345  cefTRIAXone (ROCEPHIN) 2 g in dextrose 5 % 50 mL IVPB      -- IV Every 12 hours (non-standard times) 07/20/17 0246            Review of Symptoms:  Constitutional:Endorses fevers, weight loss, chills, or weakness.  Eyes: Denies changes in vision.  ENT: Endorses throat pain   Cardiovascular: Endorses chest pain denies palpitations  Respiratory: Endorses shortness of breath, cough, hemoptysis  GI: Denies nausea/vomitting, hematochezia, melena, abd pain, but endorses decrease in appetite.  : Denies dysuria, incontinence, or hematuria.  Skin/breast: Denies rashes, lumps, lesions, or discharge.  Neurologic: Denies headache, dizziness.  Endocrine: Denies polyuria, polydipsia, heat/cold intolerance.  Hematologic/Lymph: Denies lymphadenopathy, easy bruising or easy bleeding.    OBJECTIVE:     Vital Signs (Most Recent)  Temp: 98.8 °F (37.1 °C) (07/20/17 0700)  Pulse: 99 (07/20/17 1000)  Resp: (!) 27 (07/20/17 1000)  BP: (!) 119/58 (07/20/17 1000)  SpO2: 96 % (07/20/17 1000)    Temperature Range Min/Max (Last 24H):  Temp:  [98.8 °F (37.1 °C)-99.1 °F (37.3 °C)]     Physical Exam:  Gen: Nontoxic, comfortable, no acute distress.  Cachectic.   HEENT: PERRL. No scleral icterus. EOMI intact. OP clear.  Pulmonary: Non labored. Clear to auscultation A/P/L. No wheezing, crackles, or rhonchi.   Cardiac: Grade III holosystolic murmur appreciated loudest in aortic  region. Radiates to back.   Abdomen: Normal bowel sounds. Soft, nontender, nondistended. No rebound or guarding. No hepatomegaly. Mild splenomegaly noted.  Extremities: No clubbing, cyanosis, or peripheral edema. Distal pulses symmetric..  Lymphatics: no preauricular, cervical  LAD.  Musculoskeletal: no joint deformities or effusions.  Neurological:  Alert and oriented.  CN II-XII grossly intact. Gross motor intact x4. Sensation grossly intact.  Skin: No jaundice, rashes, or visible lesions.      Laboratory:  CBC:   Lab Results   Component Value Date    WBC 12.47 07/20/2017    WBC 12.47 07/20/2017    HGB 11.0 (L) 07/20/2017    HGB 11.0 (L) 07/20/2017    HCT 33.4 (L) 07/20/2017    HCT 33.4 (L) 07/20/2017    MCV 85 07/20/2017    MCV 85 07/20/2017     07/20/2017     07/20/2017       BMP:   Recent Labs  Lab 07/20/17  0100     104  104     140  140   K 3.9  3.9  3.9     100  100   CO2 29  29  29   BUN 16  16  16   CREATININE 1.1  1.1  1.1   CALCIUM 8.6*  8.6*  8.6*   MG 1.8  1.8       LFT: Lab Results   Component Value Date    ALT 35 07/20/2017    ALT 35 07/20/2017    AST 32 07/20/2017    AST 32 07/20/2017    ALKPHOS 84 07/20/2017    ALKPHOS 84 07/20/2017    BILITOT 0.5 07/20/2017    BILITOT 0.5 07/20/2017       Microbiology:  Blood cx - 7/20/17- In progress    Diagnostic Results:  CXR - 7/20/17 - Grossly abnormal chest radiograph, demonstrating widespread, essentially symmetric airspace consolidation in both lungs, preferentially affecting the upper lobes.  Multiple etiologies exist, including cardiogenic and noncardiogenic causes of pulmonary edema, extensive bilateral pneumonia, and pulmonary hemorrhage, with clinical correlation essential.    2D Echo c/ color flow doppler - Pathologic findings listed below.  The LV mass index was increased at 134.7 g/m2 consistent with eccentric left ventricular hypertrophy. There are no regional wall motion abnormalities. Left  ventricular systolic function appears normal. Visually   estimated ejection fraction is 60-65%.    Aortic Valve:  The aortic valve is thickened. The aortic valve is bi-leaflet in structure. Additionally, there is severe aortic regurgitation. There is a pressure half time of 200.0 msec. Type 0 bicupsid aortic valve with thickened poasterior leaflet and   nodular denisty on it possibly suggesting vegetation    Mitral Valve:  The mitral valve is thickened. There is severe mitral regurgitation. 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  There are two jets   of MR, one through the perforation and another, central.  ASSESSMENT/PLAN:     Active Hospital Problems    Diagnosis  POA    *Endocarditis [I38]  Yes      Resolved Hospital Problems    Diagnosis Date Resolved POA   No resolved problems to display.       Plan: Please see top of page

## 2017-07-20 NOTE — HPI
Pt is a 27 yo male with a recently diagnosed bicuspid aortic valve who presents to [facility] as a transfer from [facility] for streptococcus angionosis endocarditis. Symptoms began 2 months ago with fevers and malaise. Went to the ED multiple times before being admitted 2 weeks ago. Blood cultures from 07/04/2017 positive for streptococcus angionosis; TTE & DOUG demonstrated vegetations & newly diagnosed bicuspid aortic valve. Patient was treated with gentamicin & rocephin. Yesterday, while in hospital, the patient developed a productive cough with hemoptysis and transferred to our hospital for evaluation by CT surgery.    Today, pt complains of continued hemoptysis. He has generalized pain worst in his throat & chest, particularly his ribs; rib pain rated 8/10 and painful with movement & inspiration. He felt fevers/chills last night, no documented fever. He denies headaches, blurry vision, n/v, or rash. He has had a 30 lb wt loss over the last two months. Positive risk factors for endocarditis include a history of drug abuse (denies Iv) & valvular disease (bicuspid aortic valve, recently diagnosed at [facility].)  CXR demonstrate parenchymal opacities consistent with pulmonary edema, BL PNA or pulmonary hemorrhage. 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 No leukocytosis, blood cultures NGTD.  Currently on rocephin.

## 2017-07-20 NOTE — SUBJECTIVE & OBJECTIVE
Past Medical History:   Diagnosis Date    Dislocation of shoulder, left, closed        History reviewed. No pertinent surgical history.    Review of patient's allergies indicates:  No Known Allergies    Medications:  Prescriptions Prior to Admission   Medication Sig    meloxicam (MOBIC) 7.5 MG tablet 1-2 tablets once daily for pain     Antibiotics     Start     Stop Route Frequency Ordered    07/20/17 0345  cefTRIAXone (ROCEPHIN) 2 g in dextrose 5 % 50 mL IVPB      -- IV Every 12 hours (non-standard times) 07/20/17 0246        Antifungals     None        Antivirals     None             There is no immunization history on file for this patient.    Family History     None        Social History     Social History    Marital status: Significant Other     Spouse name: N/A    Number of children: N/A    Years of education: N/A     Social History Main Topics    Smoking status: Never Smoker    Smokeless tobacco: Current User     Types: Snuff    Alcohol use Yes      Comment: occas    Drug use:      Types: Methamphetamines, MDMA (Ecstacy), Benzodiazepines    Sexual activity: Yes     Partners: Female     Other Topics Concern    None     Social History Narrative    None     Review of Systems   Constitutional: Positive for chills and fever.   HENT: Positive for sore throat.    Eyes: Negative for visual disturbance.   Respiratory: Positive for cough (hemoptysis). Negative for shortness of breath.    Cardiovascular: Positive for chest pain (localized to ribs).   Gastrointestinal: Positive for abdominal pain. Negative for diarrhea, nausea and vomiting.   Genitourinary: Negative for difficulty urinating and dysuria.   Musculoskeletal:        Generalized aches & pain   Skin: Negative for color change and rash.   Neurological: Negative for headaches.     Objective:     Vital Signs (Most Recent):  Temp: 98.5 °F (36.9 °C) (07/20/17 1100)  Pulse: 96 (07/20/17 1100)  Resp: (!) 26 (07/20/17 1100)  BP: (!) 117/57 (07/20/17  1100)  SpO2: 100 % (07/20/17 1100) Vital Signs (24h Range):  Temp:  [98.5 °F (36.9 °C)-99.1 °F (37.3 °C)] 98.5 °F (36.9 °C)  Pulse:  [84-99] 96  Resp:  [16-40] 26  SpO2:  [92 %-100 %] 100 %  BP: ()/(51-66) 117/57     Weight: 68.3 kg (150 lb 9.2 oz)  Body mass index is 22.89 kg/m².    Estimated Creatinine Clearance: 99.2 mL/min (based on Cr of 1.1).    Physical Exam   Constitutional: He is oriented to person, place, and time. He appears distressed (in pain).   Thin-appearing   HENT:   Head: Normocephalic.   Eyes:   Negative woodson spots   Cardiovascular: Regular rhythm.    Murmur (systolic) heard.  HR 96   Pulmonary/Chest: Effort normal.   Decreased breath sounds   Abdominal: There is tenderness (lower abdomen, worse in LLQ).   splenomegaly   Musculoskeletal: He exhibits no edema or tenderness.   Neurological: He is alert and oriented to person, place, and time.   Skin: Skin is warm and dry. He is not diaphoretic.   Psychiatric: He has a normal mood and affect. His behavior is normal.       Significant Labs:   Blood Culture:   Recent Labs  Lab 07/20/17  0130 07/20/17  0200   LABBLOO No Growth to date No Growth to date     CBC:   Recent Labs  Lab 07/20/17  0100   WBC 12.47  12.47   HGB 11.0*  11.0*   HCT 33.4*  33.4*     293     CMP:   Recent Labs  Lab 07/20/17  0100     140  140   K 3.9  3.9  3.9     100  100   CO2 29  29  29     104  104   BUN 16  16  16   CREATININE 1.1  1.1  1.1   CALCIUM 8.6*  8.6*  8.6*   PROT 6.5  6.5   ALBUMIN 2.9*  2.9*   BILITOT 0.5  0.5   ALKPHOS 84  84   AST 32  32   ALT 35  35   ANIONGAP 11  11  11   EGFRNONAA >60.0  >60.0  >60.0       Significant Imaging: I have reviewed all pertinent imaging results/findings within the past 24 hours.

## 2017-07-20 NOTE — CONSULTS
"Consult Note  Cardiothoracic Surgery    Inpatient consult to Cardiothoracic Surgery  Consult performed by: SABINO EDWARD  Consult ordered by: DICKSON HENRY        SUBJECTIVE:     History of Present Illness:  Mr Palacio is a 26 year old male who was transferred in from OSH for surgical evaluation for endocarditis. He states he has felt poorly for the last 2 months with fevers and generalized malaise. He went to the ED several times and was told he had a respiratory virus. He then was finally admitted about 2 weeks ago.     Today he states he was transferred because he had been "coughing up blood." He was transferred with limited records but the nurse did call the OSH to find out he has been on micafungin, Vanc, Gentamicin, and Rocephin abx therapy. In addition he had been getting a cough suppressant and prn xanax for anxiety. He also had a DOUG, but we don't have a copy of the study or report. He states that his only complaint currently is that he has a sore throat from coughing.     He states his only home medication is methadone.      The patient states he does have some type of congenital cardiac defect that was discovered recently. Again, no record of this. He states it is a "missing valve."  [place] [facility] [place] valve.     Scheduled Meds:   cefTRIAXone (ROCEPHIN) IVPB  2 g Intravenous Q12H    furosemide  20 mg Intravenous BID    heparin (porcine)  5,000 Units Subcutaneous Q8H    nicotine  1 patch Transdermal Daily    pantoprazole  40 mg Oral Daily    sodium chloride 0.9%  3 mL Intravenous Q8H     Infusions/Drips:   PRN Meds:(Magic mouthwash) 1:1:1 Benadryl 12.5mg/5ml liq, aluminum & magnesium hydroxide-simehticone (Maalox), lidocaine viscous 2%, alprazolam, alprazolam, ondansetron    Review of patient's allergies indicates:  No Known Allergies    Past Medical History:   Diagnosis Date    Dislocation of shoulder, left, closed      History reviewed. No pertinent surgical history.  History " reviewed. No pertinent family history.  Social History   Substance Use Topics    Smoking status: Never Smoker    Smokeless tobacco: Current User     Types: Snuff    Alcohol use Yes      Comment: occas        Review of Systems:  Review of Systems   Constitutional: Negative for fever and malaise/fatigue.   HENT: Negative for congestion and sore throat.    Eyes: Negative for blurred vision, double vision and discharge.   Respiratory: Positive for cough, shortness of breath.    Cardiovascular: Negative for chest pain, palpitations, claudication, leg swelling and PND.   Gastrointestinal: Negative for abdominal pain, constipation, diarrhea, nausea and vomiting.   Genitourinary: Negative for dysuria, frequency and urgency.   Musculoskeletal: Negative for back pain and myalgias.   Skin: Negative for itching and rash.   Neurological: Negative for dizziness, speech change, seizures, weakness and headaches.   Endo/Heme/Allergies: Does not bruise/bleed easily.   Psychiatric/Behavioral: Negative for depression. The patient is not nervous/anxious.      OBJECTIVE:     Vital Signs (Most Recent)  Temp: 98.5 °F (36.9 °C) (07/20/17 1100)  Pulse: 96 (07/20/17 1100)  Resp: (!) 26 (07/20/17 1100)  BP: (!) 117/57 (07/20/17 1100)  SpO2: 100 % (07/20/17 1100)    Admission Weight: 68.3 kg (150 lb 9.2 oz) (07/20/17 0059)   Most Recent Weight: 68.3 kg (150 lb 9.2 oz) (07/20/17 0059)    Vital Signs Range (Last 24H):  Temp:  [98.5 °F (36.9 °C)-99.1 °F (37.3 °C)]   Pulse:  [84-99]   Resp:  [16-40]   BP: ()/(51-66)   SpO2:  [92 %-100 %]     Physical Exam:  Constitutional: Patient appears well-developed and well-nourished.   HENT:   Head: Normocephalic and atraumatic.   Eyes: Pupils are equal, round, and reactive to light.   Neck: Normal range of motion. Neck supple.   Cardiovascular: Normal rate, regular rhythm and normal heart sounds.    Pulmonary/Chest: Effort normal and breath sounds normal.   Abdominal: Soft. Bowel sounds are normal.    Musculoskeletal: Normal range of motion.   Neurological: Alert and oriented to person, place, and time.   Skin: Skin is warm and dry.   Psychiatric: Normal mood and affect. Behavior is normal.     Laboratory:  CBC:   Recent Labs  Lab 07/20/17  0100   WBC 12.47  12.47   RBC 3.93*  3.93*   HGB 11.0*  11.0*   HCT 33.4*  33.4*     293   MCV 85  85   MCH 28.0  28.0   MCHC 32.9  32.9     BMP:   Recent Labs  Lab 07/20/17  0100     104  104     140  140   K 3.9  3.9  3.9     100  100   CO2 29  29  29   BUN 16  16  16   CREATININE 1.1  1.1  1.1   CALCIUM 8.6*  8.6*  8.6*   MG 1.8  1.8       Diagnostic Results:  2D ECHO 7/20  Aortic Valve: valve is thickened. The aortic valve is bi-leaflet in structure. Additionally, there is severe aortic regurgitation. 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 There is severe mitral regurgitation. 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  There are two jets of MR, one through the perforation and another, central.     ASSESSMENT/PLAN:   Mr Palacio is a 26 year old male who was transferred in from [facility] for surgical evaluation for endocarditis.    PLAN: Dr. Fitch to staff, please await staff addendum.   CTS Attending Note:    I have personally taken the history and examined this patient and agree with the NP's note as stated above. I reviewed the echo from today and the DOUG from 2 weeks ago at [place]. Patient with symptoms including night sweats fr 2 months. Transferred for care. Aortic and Mitral with severe insufficiency and evidence of endocarditis. Patient is stable and neurologically intact. I spoke with he and his family in detail. We will plan valve repair/replacement and reconstruction as needed. I explained that he  has a high mortality with this disease and operation. The patient told me to choose the best type of valve for him either mechanical or tissue. He [place] off shore and also has difficulty with medical care.

## 2017-07-20 NOTE — PLAN OF CARE
Problem: Patient Care Overview  Goal: Plan of Care Review  Outcome: Ongoing (interventions implemented as appropriate)  Pt received 2D echo today. Pt reports pain relief following administration of pain medication. Runs of SVT intermittent. VS stable.

## 2017-07-20 NOTE — ASSESSMENT & PLAN NOTE
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

## 2017-07-20 NOTE — CONSULTS
[facility]  Pulmonology  Consult Note    Patient Name: Kwan Palacio  MRN: [id number]  Admission Date: 7/19/2017  Hospital Length of Stay: 1 days  Code Status: Full Code  Attending Physician: Torey Gonzalez MD  Primary Care Provider: Primary Doctor No   Principal Problem: Endocarditis    Consults  Subjective:     HPI:  26yoM who presents from OSH with strep angionosis endocarditis (aortic and mitral).  Reports that he had ~2 weeks of sx prior to being admitted.  Complains of a persistent cough and pain in his chest and ribs with coughing.  States that he is bringing up sputum that is yellow and mixed with blood for the past 2 days.  Denies any leon blood clots.  States he continues to feel unwell because of the pain all over his body.  No nausea, vomiting.  Still feels like his breathing is somewhat shallow.    Past Medical History:   Diagnosis Date    Dislocation of shoulder, left, closed        History reviewed. No pertinent surgical history.    Review of patient's allergies indicates:  No Known Allergies    Family History     None        Social History Main Topics    Smoking status: Never Smoker    Smokeless tobacco: Current User     Types: Snuff    Alcohol use Yes      Comment: occas    Drug use:      Types: Methamphetamines, MDMA (Ecstacy), Benzodiazepines    Sexual activity: Yes     Partners: Female         Review of Systems   Constitutional: Negative for activity change, fever and unexpected weight change.   HENT: Negative for hearing loss and tinnitus.    Respiratory: Positive for shortness of breath. Negative for choking and wheezing.    Cardiovascular: Positive for chest pain. Negative for palpitations and leg swelling.   Gastrointestinal: Negative for abdominal pain and blood in stool.   Endocrine: Negative.    Genitourinary: Negative.    Neurological: Negative.      Objective:     Vital Signs (Most Recent):  Temp: 98.5 °F (36.9 °C) (07/20/17 1500)  Pulse: 110 (07/20/17  1800)  Resp: (!) 33 (07/20/17 1800)  BP: 115/61 (07/20/17 1800)  SpO2: 97 % (07/20/17 1800) Vital Signs (24h Range):  Temp:  [98.5 °F (36.9 °C)-99.1 °F (37.3 °C)] 98.5 °F (36.9 °C)  Pulse:  [] 110  Resp:  [14-40] 33  SpO2:  [91 %-100 %] 97 %  BP: ()/(51-70) 115/61     Weight: 68.3 kg (150 lb 9.2 oz)  Body mass index is 22.89 kg/m².      Intake/Output Summary (Last 24 hours) at 07/20/17 1820  Last data filed at 07/20/17 1600   Gross per 24 hour   Intake              100 ml   Output              985 ml   Net             -885 ml       Physical Exam    Gen: Awake, conversant on nasal cannula  HEENT: EOMI  CV: Tachycardic, loud systolic and diastolic murmurs  Pulm: Crackles bilaterally though breath sounds somewhat diminished  Abd: Soft  Ext: No cyanosis, clubbing    Vents:       Lines/Drains/Airways     Peripheral Intravenous Line                 Peripheral IV - Single Lumen 07/19/17 0020 Left Forearm 1 day         Peripheral IV - Single Lumen 07/20/17 0130 Left Antecubital less than 1 day                Significant Labs:    CBC/Anemia Profile:    Recent Labs  Lab 07/20/17  0100   WBC 12.47  12.47   HGB 11.0*  11.0*   HCT 33.4*  33.4*     293   MCV 85  85   RDW 17.2*  17.2*        Chemistries:    Recent Labs  Lab 07/20/17  0100     140  140   K 3.9  3.9  3.9     100  100   CO2 29  29  29   BUN 16  16  16   CREATININE 1.1  1.1  1.1   CALCIUM 8.6*  8.6*  8.6*   ALBUMIN 2.9*  2.9*   PROT 6.5  6.5   BILITOT 0.5  0.5   ALKPHOS 84  84   ALT 35  35   AST 32  32   MG 1.8  1.8   PHOS 4.7*  4.7*       ABGs: No results for input(s): PH, PCO2, HCO3, POCSATURATED, BE in the last 48 hours.  CMP:   Recent Labs  Lab 07/20/17  0100     140  140   K 3.9  3.9  3.9     100  100   CO2 29  29  29     104  104   BUN 16  16  16   CREATININE 1.1  1.1  1.1   CALCIUM 8.6*  8.6*  8.6*   PROT 6.5  6.5   ALBUMIN 2.9*  2.9*   BILITOT 0.5  0.5   ALKPHOS 84   84   AST 32  32   ALT 35  35   ANIONGAP 11  11  11   EGFRNONAA >60.0  >60.0  >60.0       Significant Imaging:   I have reviewed all pertinent imaging results/findings within the past 24 hours.    Assessment/Plan:     Hemoptysis    26yoM with infective endocarditis 2/2 strep angionosis with hemoptysis over the past 2 days.  Reports it was at its worst yesterday and has improved since.  He denies any leon blood clots though this is somewhat uncertain.  Differential is broad; we only know of aortic and mitral endocarditis - no known right sided disease - if that were the case then would have to worry about septic emboli.  Primary team concerned for pulmonary edema.  Could also be DAH from infective endocarditis vs ARDS in the setting of infection.    - His hemoptysis is improving - his CXR is impressive for patchy airspace disease; a CT may better characterize the parenchymal lesions  - Treatment for his underlying condition (Infective endocarditis) would be the primary treatment for several possible etiologies on the differential - being evaluated for surgical valve replacement  - Checking a BNP, diuresis may help resolve his hemoptysis as well  - If it progresses or fails to improve; CTA to localize the bleed site for IR embolization is not unreasonable - we may also consider bronchoscopy at that time to help confirm etiology of hemoptysis        * Endocarditis    Treatment per primary team and  ID recommendations w/ evaluation for source control with surgical valve replacement and abscess drainagae              Thank you for your consult. I will follow-up with patient. Please contact us if you have any additional questions.     Jose Roberto Dyson MD  Pulmonology  [facility]

## 2017-07-21 ENCOUNTER — ANESTHESIA EVENT (OUTPATIENT)
Dept: SURGERY | Facility: HOSPITAL | Age: 27
DRG: 220 | End: 2017-07-21
Payer: MEDICAID

## 2017-07-21 ENCOUNTER — ANESTHESIA (OUTPATIENT)
Dept: SURGERY | Facility: HOSPITAL | Age: 27
DRG: 220 | End: 2017-07-21
Payer: MEDICAID

## 2017-07-21 PROBLEM — Z98.890 S/P MVR (MITRAL VALVE REPAIR): Status: ACTIVE | Noted: 2017-07-21

## 2017-07-21 PROBLEM — Z95.2 S/P AVR (AORTIC VALVE REPLACEMENT): Status: ACTIVE | Noted: 2017-07-21

## 2017-07-21 PROBLEM — R73.9 STRESS HYPERGLYCEMIA: Status: ACTIVE | Noted: 2017-07-21

## 2017-07-21 PROBLEM — F19.10 IV DRUG ABUSE: Status: ACTIVE | Noted: 2017-07-21

## 2017-07-21 PROBLEM — I44.0 AV BLOCK, 1ST DEGREE: Status: ACTIVE | Noted: 2017-07-21

## 2017-07-21 LAB
ABO GROUP BLD: NORMAL
ALLENS TEST: ABNORMAL
ANION GAP SERPL CALC-SCNC: 13 MMOL/L
ANION GAP SERPL CALC-SCNC: 15 MMOL/L
ANION GAP SERPL CALC-SCNC: 16 MMOL/L
APTT BLDCRRT: 31.8 SEC
BASOPHILS # BLD AUTO: 0.02 K/UL
BASOPHILS # BLD AUTO: ABNORMAL K/UL
BASOPHILS NFR BLD: 0 %
BASOPHILS NFR BLD: 0.2 %
BLD GP AB SCN CELLS X3 SERPL QL: NORMAL
BUN SERPL-MCNC: 21 MG/DL
BUN SERPL-MCNC: 21 MG/DL
BUN SERPL-MCNC: 22 MG/DL
CALCIUM SERPL-MCNC: 7.7 MG/DL
CALCIUM SERPL-MCNC: 8 MG/DL
CALCIUM SERPL-MCNC: 9.3 MG/DL
CHLORIDE SERPL-SCNC: 101 MMOL/L
CHLORIDE SERPL-SCNC: 102 MMOL/L
CHLORIDE SERPL-SCNC: 102 MMOL/L
CO2 SERPL-SCNC: 18 MMOL/L
CO2 SERPL-SCNC: 19 MMOL/L
CO2 SERPL-SCNC: 21 MMOL/L
CREAT SERPL-MCNC: 1.2 MG/DL
CREAT SERPL-MCNC: 1.2 MG/DL
CREAT SERPL-MCNC: 1.3 MG/DL
DELSYS: ABNORMAL
DIFFERENTIAL METHOD: ABNORMAL
DIFFERENTIAL METHOD: ABNORMAL
EOSINOPHIL # BLD AUTO: 0 K/UL
EOSINOPHIL # BLD AUTO: ABNORMAL K/UL
EOSINOPHIL NFR BLD: 0 %
EOSINOPHIL NFR BLD: 1 %
ERYTHROCYTE [DISTWIDTH] IN BLOOD BY AUTOMATED COUNT: 16.2 %
ERYTHROCYTE [DISTWIDTH] IN BLOOD BY AUTOMATED COUNT: 16.5 %
ERYTHROCYTE [SEDIMENTATION RATE] IN BLOOD BY WESTERGREN METHOD: 14 MM/H
ERYTHROCYTE [SEDIMENTATION RATE] IN BLOOD BY WESTERGREN METHOD: 23 MM/H
EST. GFR  (AFRICAN AMERICAN): >60 ML/MIN/1.73 M^2
EST. GFR  (NON AFRICAN AMERICAN): >60 ML/MIN/1.73 M^2
FIO2: 100
FIO2: 100
FIO2: 40
FIO2: 65
FIO2: 70
FIO2: 90
FLOW: 60
GLUCOSE SERPL-MCNC: 156 MG/DL
GLUCOSE SERPL-MCNC: 172 MG/DL
GLUCOSE SERPL-MCNC: 269 MG/DL (ref 70–110)
GLUCOSE SERPL-MCNC: 272 MG/DL (ref 70–110)
GLUCOSE SERPL-MCNC: 274 MG/DL (ref 70–110)
GLUCOSE SERPL-MCNC: 279 MG/DL (ref 70–110)
GLUCOSE SERPL-MCNC: 89 MG/DL
GRAM STN SPEC: NORMAL
GRAM STN SPEC: NORMAL
HCO3 UR-SCNC: 21.3 MMOL/L (ref 24–28)
HCO3 UR-SCNC: 22.7 MMOL/L (ref 24–28)
HCO3 UR-SCNC: 22.8 MMOL/L (ref 24–28)
HCO3 UR-SCNC: 23.6 MMOL/L (ref 24–28)
HCO3 UR-SCNC: 24.1 MMOL/L (ref 24–28)
HCO3 UR-SCNC: 27.5 MMOL/L (ref 24–28)
HCO3 UR-SCNC: 29.5 MMOL/L (ref 24–28)
HCT VFR BLD AUTO: 24.6 %
HCT VFR BLD AUTO: 27.7 %
HCT VFR BLD CALC: 18 %PCV (ref 36–54)
HCT VFR BLD CALC: 20 %PCV (ref 36–54)
HCT VFR BLD CALC: 20 %PCV (ref 36–54)
HCT VFR BLD CALC: 22 %PCV (ref 36–54)
HCT VFR BLD CALC: 23 %PCV (ref 36–54)
HGB BLD-MCNC: 8 G/DL
HGB BLD-MCNC: 9.1 G/DL
INR PPP: 1.2
LYMPHOCYTES # BLD AUTO: 1.4 K/UL
LYMPHOCYTES # BLD AUTO: ABNORMAL K/UL
LYMPHOCYTES NFR BLD: 12.2 %
LYMPHOCYTES NFR BLD: 13 %
MAGNESIUM SERPL-MCNC: 2 MG/DL
MAGNESIUM SERPL-MCNC: 2.5 MG/DL
MCH RBC QN AUTO: 26.8 PG
MCH RBC QN AUTO: 27.3 PG
MCHC RBC AUTO-ENTMCNC: 32.5 G/DL
MCHC RBC AUTO-ENTMCNC: 32.9 G/DL
MCV RBC AUTO: 83 FL
MCV RBC AUTO: 83 FL
MODE: ABNORMAL
MODE: ABNORMAL
MONOCYTES # BLD AUTO: 0.9 K/UL
MONOCYTES # BLD AUTO: ABNORMAL K/UL
MONOCYTES NFR BLD: 3 %
MONOCYTES NFR BLD: 7.7 %
NEUTROPHILS # BLD AUTO: 9 K/UL
NEUTROPHILS NFR BLD: 77 %
NEUTROPHILS NFR BLD: 79.6 %
NEUTS BAND NFR BLD MANUAL: 6 %
PCO2 BLDA: 36.2 MMHG (ref 35–45)
PCO2 BLDA: 39.7 MMHG (ref 35–45)
PCO2 BLDA: 42 MMHG (ref 35–45)
PCO2 BLDA: 44.4 MMHG (ref 35–45)
PCO2 BLDA: 44.8 MMHG (ref 35–45)
PCO2 BLDA: 46.7 MMHG (ref 35–45)
PCO2 BLDA: 47.3 MMHG (ref 35–45)
PEEP: 5
PH SMN: 7.31 [PH] (ref 7.35–7.45)
PH SMN: 7.31 [PH] (ref 7.35–7.45)
PH SMN: 7.34 [PH] (ref 7.35–7.45)
PH SMN: 7.37 [PH] (ref 7.35–7.45)
PH SMN: 7.38 [PH] (ref 7.35–7.45)
PH SMN: 7.4 [PH] (ref 7.35–7.45)
PH SMN: 7.42 [PH] (ref 7.35–7.45)
PHOSPHATE SERPL-MCNC: 2.8 MG/DL
PHOSPHATE SERPL-MCNC: 3 MG/DL
PLATELET # BLD AUTO: 220 K/UL
PLATELET # BLD AUTO: 224 K/UL
PLATELET BLD QL SMEAR: ABNORMAL
PMV BLD AUTO: 9.1 FL
PMV BLD AUTO: 9.1 FL
PO2 BLDA: 141 MMHG (ref 80–100)
PO2 BLDA: 186 MMHG (ref 80–100)
PO2 BLDA: 236 MMHG (ref 80–100)
PO2 BLDA: 24 MMHG (ref 40–60)
PO2 BLDA: 363 MMHG (ref 80–100)
PO2 BLDA: 47 MMHG (ref 40–60)
PO2 BLDA: 538 MMHG (ref 80–100)
POC BE: -2 MMOL/L
POC BE: -3 MMOL/L
POC BE: -3 MMOL/L
POC BE: -4 MMOL/L
POC BE: -4 MMOL/L
POC BE: 3 MMOL/L
POC BE: 5 MMOL/L
POC IONIZED CALCIUM: 1.05 MMOL/L (ref 1.06–1.42)
POC IONIZED CALCIUM: 1.06 MMOL/L (ref 1.06–1.42)
POC IONIZED CALCIUM: 1.08 MMOL/L (ref 1.06–1.42)
POC IONIZED CALCIUM: 1.09 MMOL/L (ref 1.06–1.42)
POC IONIZED CALCIUM: 1.18 MMOL/L (ref 1.06–1.42)
POC SATURATED O2: 100 % (ref 95–100)
POC SATURATED O2: 39 % (ref 95–100)
POC SATURATED O2: 79 % (ref 95–100)
POC SATURATED O2: 99 % (ref 95–100)
POC TCO2: 22 MMOL/L (ref 23–27)
POC TCO2: 24 MMOL/L (ref 23–27)
POC TCO2: 24 MMOL/L (ref 24–29)
POC TCO2: 25 MMOL/L (ref 23–27)
POC TCO2: 25 MMOL/L (ref 24–29)
POC TCO2: 29 MMOL/L (ref 23–27)
POC TCO2: 31 MMOL/L (ref 23–27)
POCT GLUCOSE: 112 MG/DL (ref 70–110)
POCT GLUCOSE: 130 MG/DL (ref 70–110)
POCT GLUCOSE: 141 MG/DL (ref 70–110)
POCT GLUCOSE: 145 MG/DL (ref 70–110)
POCT GLUCOSE: 148 MG/DL (ref 70–110)
POCT GLUCOSE: 153 MG/DL (ref 70–110)
POCT GLUCOSE: 198 MG/DL (ref 70–110)
POLYCHROMASIA BLD QL SMEAR: ABNORMAL
POTASSIUM BLD-SCNC: 3.5 MMOL/L (ref 3.5–5.1)
POTASSIUM BLD-SCNC: 3.6 MMOL/L (ref 3.5–5.1)
POTASSIUM BLD-SCNC: 4.4 MMOL/L (ref 3.5–5.1)
POTASSIUM BLD-SCNC: 5.1 MMOL/L (ref 3.5–5.1)
POTASSIUM BLD-SCNC: 5.2 MMOL/L (ref 3.5–5.1)
POTASSIUM SERPL-SCNC: 3.6 MMOL/L
POTASSIUM SERPL-SCNC: 3.6 MMOL/L
POTASSIUM SERPL-SCNC: 4.1 MMOL/L
POTASSIUM SERPL-SCNC: 4.3 MMOL/L
PROTHROMBIN TIME: 12.6 SEC
RBC # BLD AUTO: 2.98 M/UL
RBC # BLD AUTO: 3.33 M/UL
RH BLD: NORMAL
SAMPLE: ABNORMAL
SITE: ABNORMAL
SODIUM BLD-SCNC: 129 MMOL/L (ref 136–145)
SODIUM BLD-SCNC: 130 MMOL/L (ref 136–145)
SODIUM BLD-SCNC: 130 MMOL/L (ref 136–145)
SODIUM BLD-SCNC: 131 MMOL/L (ref 136–145)
SODIUM BLD-SCNC: 137 MMOL/L (ref 136–145)
SODIUM SERPL-SCNC: 134 MMOL/L
SODIUM SERPL-SCNC: 135 MMOL/L
SODIUM SERPL-SCNC: 138 MMOL/L
SP02: 100
VT: 500
WBC # BLD AUTO: 11.35 K/UL
WBC # BLD AUTO: 24.23 K/UL

## 2017-07-21 PROCEDURE — 25000242 PHARM REV CODE 250 ALT 637 W/ HCPCS: Performed by: THORACIC SURGERY (CARDIOTHORACIC VASCULAR SURGERY)

## 2017-07-21 PROCEDURE — 99232 SBSQ HOSP IP/OBS MODERATE 35: CPT | Mod: ,,, | Performed by: NURSE PRACTITIONER

## 2017-07-21 PROCEDURE — 27201423 OPTIME MED/SURG SUP & DEVICES STERILE SUPPLY: Performed by: THORACIC SURGERY (CARDIOTHORACIC VASCULAR SURGERY)

## 2017-07-21 PROCEDURE — 85027 COMPLETE CBC AUTOMATED: CPT

## 2017-07-21 PROCEDURE — 36000713 HC OR TIME LEV V EA ADD 15 MIN: Performed by: THORACIC SURGERY (CARDIOTHORACIC VASCULAR SURGERY)

## 2017-07-21 PROCEDURE — 25000003 PHARM REV CODE 250: Performed by: INTERNAL MEDICINE

## 2017-07-21 PROCEDURE — 84295 ASSAY OF SERUM SODIUM: CPT

## 2017-07-21 PROCEDURE — 63600175 PHARM REV CODE 636 W HCPCS: Performed by: THORACIC SURGERY (CARDIOTHORACIC VASCULAR SURGERY)

## 2017-07-21 PROCEDURE — 82803 BLOOD GASES ANY COMBINATION: CPT

## 2017-07-21 PROCEDURE — 88312 SPECIAL STAINS GROUP 1: CPT | Mod: 26,,, | Performed by: PATHOLOGY

## 2017-07-21 PROCEDURE — 36592 COLLECT BLOOD FROM PICC: CPT

## 2017-07-21 PROCEDURE — 84132 ASSAY OF SERUM POTASSIUM: CPT

## 2017-07-21 PROCEDURE — 82330 ASSAY OF CALCIUM: CPT

## 2017-07-21 PROCEDURE — 85610 PROTHROMBIN TIME: CPT

## 2017-07-21 PROCEDURE — 99900026 HC AIRWAY MAINTENANCE (STAT)

## 2017-07-21 PROCEDURE — 85007 BL SMEAR W/DIFF WBC COUNT: CPT

## 2017-07-21 PROCEDURE — 87102 FUNGUS ISOLATION CULTURE: CPT

## 2017-07-21 PROCEDURE — 88305 TISSUE EXAM BY PATHOLOGIST: CPT | Mod: 26,,, | Performed by: PATHOLOGY

## 2017-07-21 PROCEDURE — 94002 VENT MGMT INPAT INIT DAY: CPT

## 2017-07-21 PROCEDURE — 63600175 PHARM REV CODE 636 W HCPCS: Performed by: INTERNAL MEDICINE

## 2017-07-21 PROCEDURE — 25000003 PHARM REV CODE 250: Performed by: THORACIC SURGERY (CARDIOTHORACIC VASCULAR SURGERY)

## 2017-07-21 PROCEDURE — 36620 INSERTION CATHETER ARTERY: CPT | Mod: 59,,, | Performed by: ANESTHESIOLOGY

## 2017-07-21 PROCEDURE — 85025 COMPLETE CBC W/AUTO DIFF WBC: CPT

## 2017-07-21 PROCEDURE — 25000003 PHARM REV CODE 250: Performed by: STUDENT IN AN ORGANIZED HEALTH CARE EDUCATION/TRAINING PROGRAM

## 2017-07-21 PROCEDURE — 02RF0JZ REPLACEMENT OF AORTIC VALVE WITH SYNTHETIC SUBSTITUTE, OPEN APPROACH: ICD-10-PCS | Performed by: THORACIC SURGERY (CARDIOTHORACIC VASCULAR SURGERY)

## 2017-07-21 PROCEDURE — 87205 SMEAR GRAM STAIN: CPT

## 2017-07-21 PROCEDURE — 83735 ASSAY OF MAGNESIUM: CPT | Mod: 91

## 2017-07-21 PROCEDURE — 99232 SBSQ HOSP IP/OBS MODERATE 35: CPT | Mod: ,,, | Performed by: INTERNAL MEDICINE

## 2017-07-21 PROCEDURE — 80048 BASIC METABOLIC PNL TOTAL CA: CPT | Mod: 91

## 2017-07-21 PROCEDURE — 5A1221Z PERFORMANCE OF CARDIAC OUTPUT, CONTINUOUS: ICD-10-PCS | Performed by: THORACIC SURGERY (CARDIOTHORACIC VASCULAR SURGERY)

## 2017-07-21 PROCEDURE — 94640 AIRWAY INHALATION TREATMENT: CPT

## 2017-07-21 PROCEDURE — 87070 CULTURE OTHR SPECIMN AEROBIC: CPT

## 2017-07-21 PROCEDURE — C1729 CATH, DRAINAGE: HCPCS | Performed by: THORACIC SURGERY (CARDIOTHORACIC VASCULAR SURGERY)

## 2017-07-21 PROCEDURE — 94150 VITAL CAPACITY TEST: CPT

## 2017-07-21 PROCEDURE — 27000191 HC C-V MONITORING

## 2017-07-21 PROCEDURE — D9220A PRA ANESTHESIA: Mod: ,,, | Performed by: ANESTHESIOLOGY

## 2017-07-21 PROCEDURE — 27000221 HC OXYGEN, UP TO 24 HOURS

## 2017-07-21 PROCEDURE — 33426 REPAIR OF MITRAL VALVE: CPT | Mod: ,,, | Performed by: THORACIC SURGERY (CARDIOTHORACIC VASCULAR SURGERY)

## 2017-07-21 PROCEDURE — 27201037 HC PRESSURE MONITORING SET UP

## 2017-07-21 PROCEDURE — 85730 THROMBOPLASTIN TIME PARTIAL: CPT

## 2017-07-21 PROCEDURE — 20000000 HC ICU ROOM

## 2017-07-21 PROCEDURE — 27000175 HC ADULT BYPASS PUMP

## 2017-07-21 PROCEDURE — 80048 BASIC METABOLIC PNL TOTAL CA: CPT

## 2017-07-21 PROCEDURE — C9399 UNCLASSIFIED DRUGS OR BIOLOG: HCPCS | Performed by: ANESTHESIOLOGY

## 2017-07-21 PROCEDURE — 37000009 HC ANESTHESIA EA ADD 15 MINS: Performed by: THORACIC SURGERY (CARDIOTHORACIC VASCULAR SURGERY)

## 2017-07-21 PROCEDURE — 85014 HEMATOCRIT: CPT

## 2017-07-21 PROCEDURE — 84100 ASSAY OF PHOSPHORUS: CPT | Mod: 91

## 2017-07-21 PROCEDURE — 93312 ECHO TRANSESOPHAGEAL: CPT | Mod: 26,59,, | Performed by: ANESTHESIOLOGY

## 2017-07-21 PROCEDURE — 37799 UNLISTED PX VASCULAR SURGERY: CPT

## 2017-07-21 PROCEDURE — 36000712 HC OR TIME LEV V 1ST 15 MIN: Performed by: THORACIC SURGERY (CARDIOTHORACIC VASCULAR SURGERY)

## 2017-07-21 PROCEDURE — 87116 MYCOBACTERIA CULTURE: CPT

## 2017-07-21 PROCEDURE — 87075 CULTR BACTERIA EXCEPT BLOOD: CPT

## 2017-07-21 PROCEDURE — 84100 ASSAY OF PHOSPHORUS: CPT

## 2017-07-21 PROCEDURE — 99223 1ST HOSP IP/OBS HIGH 75: CPT | Mod: ,,, | Performed by: INTERNAL MEDICINE

## 2017-07-21 PROCEDURE — 02RX08Z REPLACEMENT OF THORACIC AORTA, ASCENDING/ARCH WITH ZOOPLASTIC TISSUE, OPEN APPROACH: ICD-10-PCS | Performed by: THORACIC SURGERY (CARDIOTHORACIC VASCULAR SURGERY)

## 2017-07-21 PROCEDURE — 02UG08Z SUPPLEMENT MITRAL VALVE WITH ZOOPLASTIC TISSUE, OPEN APPROACH: ICD-10-PCS | Performed by: THORACIC SURGERY (CARDIOTHORACIC VASCULAR SURGERY)

## 2017-07-21 PROCEDURE — 27800595 HC HEART VALVES

## 2017-07-21 PROCEDURE — 93005 ELECTROCARDIOGRAM TRACING: CPT

## 2017-07-21 PROCEDURE — 86850 RBC ANTIBODY SCREEN: CPT

## 2017-07-21 PROCEDURE — 86900 BLOOD TYPING SEROLOGIC ABO: CPT

## 2017-07-21 PROCEDURE — 33405 REPLACEMENT AORTIC VALVE OPN: CPT | Mod: 51,,, | Performed by: THORACIC SURGERY (CARDIOTHORACIC VASCULAR SURGERY)

## 2017-07-21 PROCEDURE — 86920 COMPATIBILITY TEST SPIN: CPT

## 2017-07-21 PROCEDURE — 93503 INSERT/PLACE HEART CATHETER: CPT | Mod: 59,,, | Performed by: ANESTHESIOLOGY

## 2017-07-21 PROCEDURE — 93010 ELECTROCARDIOGRAM REPORT: CPT | Mod: ,,, | Performed by: INTERNAL MEDICINE

## 2017-07-21 PROCEDURE — 87015 SPECIMEN INFECT AGNT CONCNTJ: CPT

## 2017-07-21 PROCEDURE — 88305 TISSUE EXAM BY PATHOLOGIST: CPT | Performed by: PATHOLOGY

## 2017-07-21 PROCEDURE — 37000008 HC ANESTHESIA 1ST 15 MINUTES: Performed by: THORACIC SURGERY (CARDIOTHORACIC VASCULAR SURGERY)

## 2017-07-21 PROCEDURE — 63600175 PHARM REV CODE 636 W HCPCS: Performed by: STUDENT IN AN ORGANIZED HEALTH CARE EDUCATION/TRAINING PROGRAM

## 2017-07-21 PROCEDURE — 63600175 PHARM REV CODE 636 W HCPCS: Performed by: ANESTHESIOLOGY

## 2017-07-21 PROCEDURE — 83735 ASSAY OF MAGNESIUM: CPT

## 2017-07-21 PROCEDURE — 27800903 OPTIME MED/SURG SUP & DEVICES OTHER IMPLANTS: Performed by: THORACIC SURGERY (CARDIOTHORACIC VASCULAR SURGERY)

## 2017-07-21 PROCEDURE — 85520 HEPARIN ASSAY: CPT

## 2017-07-21 PROCEDURE — 86901 BLOOD TYPING SEROLOGIC RH(D): CPT

## 2017-07-21 PROCEDURE — 27200953 HC CARDIOPLEGIA SYSTEM

## 2017-07-21 PROCEDURE — 27100088 HC CELL SAVER

## 2017-07-21 DEVICE — PATCH PERICARDIAL 9X16: Type: IMPLANTABLE DEVICE | Site: HEART | Status: FUNCTIONAL

## 2017-07-21 DEVICE — IMPLANTABLE DEVICE: Type: IMPLANTABLE DEVICE | Site: HEART | Status: FUNCTIONAL

## 2017-07-21 RX ORDER — ONDANSETRON 2 MG/ML
INJECTION INTRAMUSCULAR; INTRAVENOUS
Status: DISCONTINUED | OUTPATIENT
Start: 2017-07-21 | End: 2017-07-21

## 2017-07-21 RX ORDER — MUPIROCIN 20 MG/G
1 OINTMENT TOPICAL
Status: DISCONTINUED | OUTPATIENT
Start: 2017-07-21 | End: 2017-07-21 | Stop reason: HOSPADM

## 2017-07-21 RX ORDER — PROPOFOL 10 MG/ML
5 INJECTION, EMULSION INTRAVENOUS CONTINUOUS
Status: DISCONTINUED | OUTPATIENT
Start: 2017-07-21 | End: 2017-07-22

## 2017-07-21 RX ORDER — MIDAZOLAM HYDROCHLORIDE 1 MG/ML
INJECTION, SOLUTION INTRAMUSCULAR; INTRAVENOUS
Status: DISCONTINUED | OUTPATIENT
Start: 2017-07-21 | End: 2017-07-21

## 2017-07-21 RX ORDER — HEPARIN SODIUM 1000 [USP'U]/ML
INJECTION, SOLUTION INTRAVENOUS; SUBCUTANEOUS
Status: DISCONTINUED | OUTPATIENT
Start: 2017-07-21 | End: 2017-07-21

## 2017-07-21 RX ORDER — ONDANSETRON 2 MG/ML
4 INJECTION INTRAMUSCULAR; INTRAVENOUS EVERY 12 HOURS PRN
Status: DISCONTINUED | OUTPATIENT
Start: 2017-07-21 | End: 2017-08-08

## 2017-07-21 RX ORDER — IPRATROPIUM BROMIDE AND ALBUTEROL SULFATE 2.5; .5 MG/3ML; MG/3ML
3 SOLUTION RESPIRATORY (INHALATION) EVERY 4 HOURS
Status: DISCONTINUED | OUTPATIENT
Start: 2017-07-21 | End: 2017-07-23

## 2017-07-21 RX ORDER — FENTANYL CITRATE 50 UG/ML
INJECTION, SOLUTION INTRAMUSCULAR; INTRAVENOUS
Status: DISCONTINUED | OUTPATIENT
Start: 2017-07-21 | End: 2017-07-21

## 2017-07-21 RX ORDER — LIDOCAINE HCL/PF 100 MG/5ML
SYRINGE (ML) INTRAVENOUS
Status: DISCONTINUED | OUTPATIENT
Start: 2017-07-21 | End: 2017-07-21

## 2017-07-21 RX ORDER — FENTANYL CITRATE 50 UG/ML
25 INJECTION, SOLUTION INTRAMUSCULAR; INTRAVENOUS
Status: DISCONTINUED | OUTPATIENT
Start: 2017-07-21 | End: 2017-07-21

## 2017-07-21 RX ORDER — ASPIRIN 325 MG
325 TABLET ORAL ONCE
Status: DISCONTINUED | OUTPATIENT
Start: 2017-07-21 | End: 2017-07-24

## 2017-07-21 RX ORDER — POTASSIUM CHLORIDE 14.9 MG/ML
60 INJECTION INTRAVENOUS
Status: DISCONTINUED | OUTPATIENT
Start: 2017-07-21 | End: 2017-07-25

## 2017-07-21 RX ORDER — NOREPINEPHRINE BITARTRATE/D5W 4MG/250ML
0.02 PLASTIC BAG, INJECTION (ML) INTRAVENOUS CONTINUOUS
Status: DISCONTINUED | OUTPATIENT
Start: 2017-07-21 | End: 2017-07-22

## 2017-07-21 RX ORDER — ROCURONIUM BROMIDE 10 MG/ML
INJECTION, SOLUTION INTRAVENOUS
Status: DISCONTINUED | OUTPATIENT
Start: 2017-07-21 | End: 2017-07-21

## 2017-07-21 RX ORDER — POTASSIUM CHLORIDE 29.8 MG/ML
40 INJECTION INTRAVENOUS
Status: DISCONTINUED | OUTPATIENT
Start: 2017-07-21 | End: 2017-07-25

## 2017-07-21 RX ORDER — FAMOTIDINE 10 MG/ML
20 INJECTION INTRAVENOUS 2 TIMES DAILY
Status: DISCONTINUED | OUTPATIENT
Start: 2017-07-21 | End: 2017-07-25

## 2017-07-21 RX ORDER — LACTULOSE 10 G/15ML
20 SOLUTION ORAL EVERY 6 HOURS PRN
Status: DISCONTINUED | OUTPATIENT
Start: 2017-07-21 | End: 2017-08-08

## 2017-07-21 RX ORDER — OXYCODONE HYDROCHLORIDE 5 MG/1
10 TABLET ORAL
Status: DISCONTINUED | OUTPATIENT
Start: 2017-07-21 | End: 2017-07-21

## 2017-07-21 RX ORDER — NALOXONE HCL 0.4 MG/ML
0.02 VIAL (ML) INJECTION
Status: DISCONTINUED | OUTPATIENT
Start: 2017-07-22 | End: 2017-07-25

## 2017-07-21 RX ORDER — PROTAMINE SULFATE 10 MG/ML
INJECTION, SOLUTION INTRAVENOUS
Status: DISCONTINUED | OUTPATIENT
Start: 2017-07-21 | End: 2017-07-21

## 2017-07-21 RX ORDER — FENTANYL CITRATE 50 UG/ML
50 INJECTION, SOLUTION INTRAMUSCULAR; INTRAVENOUS
Status: DISCONTINUED | OUTPATIENT
Start: 2017-07-21 | End: 2017-07-21

## 2017-07-21 RX ORDER — SODIUM CHLORIDE, SODIUM LACTATE, POTASSIUM CHLORIDE, CALCIUM CHLORIDE 600; 310; 30; 20 MG/100ML; MG/100ML; MG/100ML; MG/100ML
1000 INJECTION, SOLUTION INTRAVENOUS
Status: DISCONTINUED | OUTPATIENT
Start: 2017-07-21 | End: 2017-07-22

## 2017-07-21 RX ORDER — ALBUMIN HUMAN 50 G/1000ML
500 SOLUTION INTRAVENOUS
Status: DISCONTINUED | OUTPATIENT
Start: 2017-07-21 | End: 2017-07-27

## 2017-07-21 RX ORDER — MUPIROCIN 20 MG/G
1 OINTMENT TOPICAL 2 TIMES DAILY
Status: COMPLETED | OUTPATIENT
Start: 2017-07-21 | End: 2017-07-24

## 2017-07-21 RX ORDER — MAGNESIUM SULFATE HEPTAHYDRATE 40 MG/ML
2 INJECTION, SOLUTION INTRAVENOUS
Status: DISCONTINUED | OUTPATIENT
Start: 2017-07-21 | End: 2017-07-25

## 2017-07-21 RX ORDER — ACETAMINOPHEN 10 MG/ML
1000 INJECTION, SOLUTION INTRAVENOUS ONCE
Status: COMPLETED | OUTPATIENT
Start: 2017-07-22 | End: 2017-07-21

## 2017-07-21 RX ORDER — METOCLOPRAMIDE HYDROCHLORIDE 5 MG/ML
5 INJECTION INTRAMUSCULAR; INTRAVENOUS EVERY 6 HOURS PRN
Status: DISCONTINUED | OUTPATIENT
Start: 2017-07-21 | End: 2017-07-27

## 2017-07-21 RX ORDER — POTASSIUM CHLORIDE 14.9 MG/ML
20 INJECTION INTRAVENOUS
Status: DISCONTINUED | OUTPATIENT
Start: 2017-07-21 | End: 2017-07-25

## 2017-07-21 RX ORDER — ACETAMINOPHEN 10 MG/ML
INJECTION, SOLUTION INTRAVENOUS
Status: DISCONTINUED | OUTPATIENT
Start: 2017-07-21 | End: 2017-07-21

## 2017-07-21 RX ORDER — LIDOCAINE HYDROCHLORIDE 10 MG/ML
1 INJECTION, SOLUTION EPIDURAL; INFILTRATION; INTRACAUDAL; PERINEURAL ONCE
Status: DISCONTINUED | OUTPATIENT
Start: 2017-07-21 | End: 2017-07-21 | Stop reason: HOSPADM

## 2017-07-21 RX ORDER — BACITRACIN 50000 [IU]/1
INJECTION, POWDER, FOR SOLUTION INTRAMUSCULAR
Status: DISCONTINUED | OUTPATIENT
Start: 2017-07-21 | End: 2017-07-21 | Stop reason: HOSPADM

## 2017-07-21 RX ORDER — HYDROMORPHONE HCL IN 0.9% NACL 6 MG/30 ML
PATIENT CONTROLLED ANALGESIA SYRINGE INTRAVENOUS CONTINUOUS
Status: DISCONTINUED | OUTPATIENT
Start: 2017-07-22 | End: 2017-07-25

## 2017-07-21 RX ORDER — ASPIRIN 325 MG
325 TABLET ORAL DAILY
Status: DISCONTINUED | OUTPATIENT
Start: 2017-07-22 | End: 2017-08-08 | Stop reason: HOSPADM

## 2017-07-21 RX ORDER — HYDROMORPHONE HYDROCHLORIDE 1 MG/ML
0.5 INJECTION, SOLUTION INTRAMUSCULAR; INTRAVENOUS; SUBCUTANEOUS ONCE
Status: COMPLETED | OUTPATIENT
Start: 2017-07-21 | End: 2017-07-21

## 2017-07-21 RX ORDER — POTASSIUM CHLORIDE 14.9 MG/ML
INJECTION INTRAVENOUS CONTINUOUS PRN
Status: DISCONTINUED | OUTPATIENT
Start: 2017-07-21 | End: 2017-07-21

## 2017-07-21 RX ORDER — OXYCODONE HYDROCHLORIDE 5 MG/1
15 TABLET ORAL
Status: DISCONTINUED | OUTPATIENT
Start: 2017-07-21 | End: 2017-07-21

## 2017-07-21 RX ORDER — KETAMINE HYDROCHLORIDE 100 MG/ML
INJECTION, SOLUTION INTRAMUSCULAR; INTRAVENOUS
Status: DISCONTINUED | OUTPATIENT
Start: 2017-07-21 | End: 2017-07-21

## 2017-07-21 RX ORDER — POLYETHYLENE GLYCOL 3350 17 G/17G
17 POWDER, FOR SOLUTION ORAL DAILY
Status: DISCONTINUED | OUTPATIENT
Start: 2017-07-22 | End: 2017-07-27

## 2017-07-21 RX ORDER — NICARDIPINE HYDROCHLORIDE 0.2 MG/ML
5 INJECTION INTRAVENOUS CONTINUOUS
Status: DISCONTINUED | OUTPATIENT
Start: 2017-07-21 | End: 2017-07-24

## 2017-07-21 RX ORDER — SODIUM CHLORIDE 0.9 % (FLUSH) 0.9 %
3 SYRINGE (ML) INJECTION EVERY 8 HOURS
Status: DISCONTINUED | OUTPATIENT
Start: 2017-07-21 | End: 2017-07-26

## 2017-07-21 RX ORDER — ASPIRIN 300 MG/1
300 SUPPOSITORY RECTAL ONCE
Status: COMPLETED | OUTPATIENT
Start: 2017-07-21 | End: 2017-07-21

## 2017-07-21 RX ADMIN — FENTANYL CITRATE 250 MCG: 50 INJECTION, SOLUTION INTRAMUSCULAR; INTRAVENOUS at 03:07

## 2017-07-21 RX ADMIN — FUROSEMIDE 20 MG: 10 INJECTION, SOLUTION INTRAVENOUS at 08:07

## 2017-07-21 RX ADMIN — CALCIUM CHLORIDE 0.5 G: 100 INJECTION, SOLUTION INTRAVENOUS at 10:07

## 2017-07-21 RX ADMIN — FENTANYL CITRATE 50 MCG: 50 INJECTION INTRAMUSCULAR; INTRAVENOUS at 09:07

## 2017-07-21 RX ADMIN — ACETAMINOPHEN 650 MG: 325 TABLET ORAL at 08:07

## 2017-07-21 RX ADMIN — MIDAZOLAM HYDROCHLORIDE 3 MG: 1 INJECTION, SOLUTION INTRAMUSCULAR; INTRAVENOUS at 09:07

## 2017-07-21 RX ADMIN — PROTAMINE SULFATE 270 MG: 10 INJECTION, SOLUTION INTRAVENOUS at 02:07

## 2017-07-21 RX ADMIN — PANTOPRAZOLE SODIUM 40 MG: 40 TABLET, DELAYED RELEASE ORAL at 08:07

## 2017-07-21 RX ADMIN — ROCURONIUM BROMIDE 50 MG: 10 INJECTION, SOLUTION INTRAVENOUS at 11:07

## 2017-07-21 RX ADMIN — ROCURONIUM BROMIDE 30 MG: 10 INJECTION, SOLUTION INTRAVENOUS at 01:07

## 2017-07-21 RX ADMIN — CALCIUM CHLORIDE 1 G: 100 INJECTION, SOLUTION INTRAVENOUS at 02:07

## 2017-07-21 RX ADMIN — LIDOCAINE HYDROCHLORIDE 100 MG: 20 INJECTION, SOLUTION INTRAVENOUS at 02:07

## 2017-07-21 RX ADMIN — LIDOCAINE HYDROCHLORIDE 20 MG: 20 INJECTION, SOLUTION INTRAVENOUS at 09:07

## 2017-07-21 RX ADMIN — NOREPINEPHRINE BITARTRATE 0.04 MCG/KG/MIN: 1 INJECTION, SOLUTION, CONCENTRATE INTRAVENOUS at 01:07

## 2017-07-21 RX ADMIN — MIDAZOLAM HYDROCHLORIDE 2 MG: 1 INJECTION, SOLUTION INTRAMUSCULAR; INTRAVENOUS at 03:07

## 2017-07-21 RX ADMIN — FAMOTIDINE 20 MG: 10 INJECTION, SOLUTION INTRAVENOUS at 08:07

## 2017-07-21 RX ADMIN — OXYCODONE HYDROCHLORIDE 15 MG: 5 TABLET ORAL at 09:07

## 2017-07-21 RX ADMIN — FENTANYL CITRATE 250 MCG: 50 INJECTION, SOLUTION INTRAMUSCULAR; INTRAVENOUS at 10:07

## 2017-07-21 RX ADMIN — MUPIROCIN 1 G: 20 OINTMENT TOPICAL at 09:07

## 2017-07-21 RX ADMIN — SODIUM CHLORIDE, SODIUM GLUCONATE, SODIUM ACETATE, POTASSIUM CHLORIDE, MAGNESIUM CHLORIDE, SODIUM PHOSPHATE, DIBASIC, AND POTASSIUM PHOSPHATE: .53; .5; .37; .037; .03; .012; .00082 INJECTION, SOLUTION INTRAVENOUS at 10:07

## 2017-07-21 RX ADMIN — HYDROMORPHONE HYDROCHLORIDE 0.5 MG: 1 INJECTION, SOLUTION INTRAMUSCULAR; INTRAVENOUS; SUBCUTANEOUS at 08:07

## 2017-07-21 RX ADMIN — KETAMINE HYDROCHLORIDE 10 MG: 100 INJECTION, SOLUTION, CONCENTRATE INTRAMUSCULAR; INTRAVENOUS at 03:07

## 2017-07-21 RX ADMIN — POTASSIUM CHLORIDE: 14.9 INJECTION, SOLUTION INTRAVENOUS at 10:07

## 2017-07-21 RX ADMIN — HYDROMORPHONE HYDROCHLORIDE 0.5 MG: 1 INJECTION, SOLUTION INTRAMUSCULAR; INTRAVENOUS; SUBCUTANEOUS at 10:07

## 2017-07-21 RX ADMIN — FENTANYL CITRATE 50 MCG: 50 INJECTION INTRAMUSCULAR; INTRAVENOUS at 05:07

## 2017-07-21 RX ADMIN — CEFTRIAXONE 2 G: 2 INJECTION, SOLUTION INTRAVENOUS at 04:07

## 2017-07-21 RX ADMIN — VASOPRESSIN 0.04 UNITS/MIN: 20 INJECTION, SOLUTION INTRAMUSCULAR; SUBCUTANEOUS at 11:07

## 2017-07-21 RX ADMIN — MIDAZOLAM HYDROCHLORIDE 4 MG: 1 INJECTION, SOLUTION INTRAMUSCULAR; INTRAVENOUS at 09:07

## 2017-07-21 RX ADMIN — ASPIRIN 300 MG: 300 SUPPOSITORY RECTAL at 05:07

## 2017-07-21 RX ADMIN — PROTAMINE SULFATE 10 MG: 10 INJECTION, SOLUTION INTRAVENOUS at 02:07

## 2017-07-21 RX ADMIN — Medication 2 G: at 01:07

## 2017-07-21 RX ADMIN — Medication 3 ML: at 10:07

## 2017-07-21 RX ADMIN — FENTANYL CITRATE 150 MCG: 50 INJECTION, SOLUTION INTRAMUSCULAR; INTRAVENOUS at 03:07

## 2017-07-21 RX ADMIN — ACETAMINOPHEN 1000 MG: 10 INJECTION, SOLUTION INTRAVENOUS at 11:07

## 2017-07-21 RX ADMIN — IPRATROPIUM BROMIDE AND ALBUTEROL SULFATE 3 ML: .5; 3 SOLUTION RESPIRATORY (INHALATION) at 07:07

## 2017-07-21 RX ADMIN — SODIUM CHLORIDE, SODIUM LACTATE, POTASSIUM CHLORIDE, AND CALCIUM CHLORIDE 1000 ML: .6; .31; .03; .02 INJECTION, SOLUTION INTRAVENOUS at 04:07

## 2017-07-21 RX ADMIN — CEFTRIAXONE 2 G: 2 INJECTION, SOLUTION INTRAVENOUS at 10:07

## 2017-07-21 RX ADMIN — ACETAMINOPHEN 1000 MG: 10 INJECTION, SOLUTION INTRAVENOUS at 10:07

## 2017-07-21 RX ADMIN — FENTANYL CITRATE 250 MCG: 50 INJECTION, SOLUTION INTRAMUSCULAR; INTRAVENOUS at 09:07

## 2017-07-21 RX ADMIN — MIDAZOLAM HYDROCHLORIDE 6 MG: 1 INJECTION, SOLUTION INTRAMUSCULAR; INTRAVENOUS at 04:07

## 2017-07-21 RX ADMIN — FENTANYL CITRATE 100 MCG: 50 INJECTION, SOLUTION INTRAMUSCULAR; INTRAVENOUS at 10:07

## 2017-07-21 RX ADMIN — HEPARIN SODIUM 33000 UNITS: 1000 INJECTION, SOLUTION INTRAVENOUS; SUBCUTANEOUS at 10:07

## 2017-07-21 RX ADMIN — Medication 2 G: at 10:07

## 2017-07-21 RX ADMIN — ROCURONIUM BROMIDE 100 MG: 10 INJECTION, SOLUTION INTRAVENOUS at 09:07

## 2017-07-21 RX ADMIN — EPINEPHRINE 0.08 MCG/KG/MIN: 1 INJECTION INTRAMUSCULAR; INTRAVENOUS; SUBCUTANEOUS at 03:07

## 2017-07-21 RX ADMIN — SUGAMMADEX 200 MG: 100 INJECTION, SOLUTION INTRAVENOUS at 04:07

## 2017-07-21 RX ADMIN — SODIUM CHLORIDE, SODIUM GLUCONATE, SODIUM ACETATE, POTASSIUM CHLORIDE, MAGNESIUM CHLORIDE, SODIUM PHOSPHATE, DIBASIC, AND POTASSIUM PHOSPHATE: .53; .5; .37; .037; .03; .012; .00082 INJECTION, SOLUTION INTRAVENOUS at 03:07

## 2017-07-21 RX ADMIN — SODIUM CHLORIDE 2 UNITS/HR: 9 INJECTION, SOLUTION INTRAVENOUS at 11:07

## 2017-07-21 NOTE — ANESTHESIA PROCEDURE NOTES
Arterial    Diagnosis: AR, MR    Patient location during procedure: done in OR  Procedure start time: 7/21/2017 10:11 AM  Timeout: 7/21/2017 10:10 AM  Procedure end time: 7/21/2017 10:15 AM  Staffing  Anesthesiologist: ARIELLA FABIAN  Resident/CRNA: SANDRA RODRIGUES  Performed: resident/CRNA   Anesthesiologist was present at the time of the procedure.  Preanesthetic Checklist  Completed: patient identified, site marked, surgical consent, pre-op evaluation, timeout performed, IV checked, risks and benefits discussed, monitors and equipment checked and anesthesia consent givenArterial  Skin Prep: chlorhexidine gluconate  Local Infiltration: none  Orientation: left  Location: radial  Catheter Size: 20 G  Catheter placement by Anatomical landmarks. Heme positive aspiration all ports.Insertion Attempts: 1  Assessment  Dressing: secured with tape and tegaderm  Patient: Tolerated well

## 2017-07-21 NOTE — MEDICAL/APP STUDENT
"General Infectious Diseases: Consult Note    Consult Requested By: Torey Gonzalez MD    Reason for Consult: Suspected Bacterial Endocarditis        IMPRESSION:     25yo male with hx of drug abuse presenting from outside hospital with suspected sub-acute bacterial endocarditis and confirmed Strep anginosus bacteremia.  [facility] 2d ECHO 6/20/17 revealed aortic and mitral valve thickening with nodules suggestive of vegetations. CT surgery for valve replacement 6/21/17. WBC stable at 12.47.       PLAN:  -  Continue Ceftriaxone 2g IV q12hr.  - Will f/u after CT surgery        Dena Heath MS 4    SUBJECTIVE:     History of Present Illness:  Patient is a 26 y.o. male with a hx of drug abuse transferred from [facility] on 6/19/17 for CTS eval. Patient has 2 mo hx of fevers, chills, and malaise. Mr. Palacio presented to the ED several times during these two months and states he was told he had "a cold" each time. Pt denies N/V. Endorses night sweats and 30lb weight loss over 2 months. Admitted to [facility] 2 weeks ago. Had first and only episode of hemoptysis 6/18/17. States he had a DOUG in which they discovered a "missing valve" but has no copy of report or records.     Admission records limited. Nurse called and was told pt was on Micafungin, Vanc, Gent, and Rocephin. Positive blood cx for Strep anginosus. Sensitivities include Amp, Ceftriaxone, Clinda, Penicillin, and Vanc. Resistant to erythromycin. Also on cough suppressent and Xanax for anxiety.     Pt admits to meth and ecstasy use but denies any IVDU. Pt states he had "bad batch of dope" around the time his symptoms began. He denies any drug use since this time.      [facility] stopped Vanc, Gent, and Micafungin. Random vanc level drawn.      Interval Hx    Pt reviewed by CT sx - Aortic and mitral valve endocarditis with likelihood of absess requiring debridement and reconstruction. Will have mitral and aortic valve replacement " this morning, 7/21/17.     Pt reviewed by Pulmonary to review hemoptysis - Resolving issue. Causes of hemoptysis include DAH from endocarditis vs ARDS in setting of infection. BNP elevated at 143. Continue diuresis. If continues, considers CTA.     Pt febrile o/n. Tmax 101. Currently on Ceftriaxone 2g IV Q12hr.    After 2 attempts for IV 18g IV access for chest and abdo CT, pt refused third attempt. Did not receive CT scan prior to CT surgery.    Past Medical History:  Past Medical History:   Diagnosis Date    Dislocation of shoulder, left, closed        Past Surgical History:  Sinus sx at age 13    Family History:  Pt states family all have heart issues, but is unsure of specifics.    Social History:  Social History   Substance Use Topics    Smoking status: Never Smoker    Smokeless tobacco: Current User     Types: Snuff    Alcohol use Yes      Comment: [employer]     Works [employer]. 28 days on. 14 off.     Allergies:  Review of patient's allergies indicates:  No Known Allergies     Pertinent Medications:  Antibiotics     Start     Stop Route Frequency Ordered    07/21/17 0826  mupirocin 2 % ointment 1 g      -- Nasl On Call Procedure 07/21/17 0827    07/20/17 0345  cefTRIAXone (ROCEPHIN) 2 g in dextrose 5 % 50 mL IVPB      -- IV Every 12 hours (non-standard times) 07/20/17 0246            Review of Symptoms:  In CT sx    OBJECTIVE:     Vital Signs (Most Recent)  Temp: (!) 101 °F (38.3 °C) (07/21/17 0808)  Pulse: 108 (07/21/17 0700)  Resp: (!) 26 (07/21/17 0700)  BP: 137/74 (07/21/17 0700)  SpO2: 95 % (07/21/17 0700)    Temperature Range Min/Max (Last 24H):  Temp:  [98.4 °F (36.9 °C)-101 °F (38.3 °C)]     Physical Exam:  Unable to obtain due to pt in CT sx    Laboratory:  CBC:   Lab Results   Component Value Date    WBC 12.47 07/20/2017    WBC 12.47 07/20/2017    HGB 11.0 (L) 07/20/2017    HGB 11.0 (L) 07/20/2017    HCT 33.4 (L) 07/20/2017    HCT 33.4 (L) 07/20/2017    MCV 85 07/20/2017    MCV 85 07/20/2017    PLT  293 07/20/2017     07/20/2017       BMP:   Recent Labs  Lab 07/21/17  0249   GLU 89      K 4.3      CO2 21*   BUN 22*   CREATININE 1.2   CALCIUM 9.3       LFT: Lab Results   Component Value Date    ALT 35 07/20/2017    ALT 35 07/20/2017    AST 32 07/20/2017    AST 32 07/20/2017    ALKPHOS 84 07/20/2017    ALKPHOS 84 07/20/2017    BILITOT 0.5 07/20/2017    BILITOT 0.5 07/20/2017       Microbiology:  Blood cx - 7/20/17- In progress      Diagnostic Results:  CXR - 7/20/17 - Grossly abnormal chest radiograph, demonstrating widespread, essentially symmetric airspace consolidation in both lungs, preferentially affecting the upper lobes.  Multiple etiologies exist, including cardiogenic and noncardiogenic causes of pulmonary edema, extensive bilateral pneumonia, and pulmonary hemorrhage, with clinical correlation essential.     2D Echo c/ color flow doppler - 6/20/17- Pathologic findings listed below.  The LV mass index was increased at 134.7 g/m2 consistent with eccentric left ventricular hypertrophy. There are no regional wall motion abnormalities. Left ventricular systolic function appears normal. Visually   estimated ejection fraction is 60-65%.     Aortic Valve:  The aortic valve is thickened. The aortic valve is bi-leaflet in structure. Additionally, there is severe aortic regurgitation. There is a pressure half time of 200.0 msec. Type 0 bicupsid aortic valve with thickened poasterior leaflet and nodular denisty on it possibly suggesting vegetation    Mitral Valve:  The mitral valve is thickened. There is severe mitral regurgitation. 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 There are two jets of MR, one through the perforation and another, central.    ASSESSMENT/PLAN:     Active Hospital Problems    Diagnosis  POA    *Endocarditis [I38]  Yes    Hemoptysis [R04.2]  Unknown      Resolved  Hospital Problems    Diagnosis Date Resolved POA   No resolved problems to display.       Plan: Please see top of page

## 2017-07-21 NOTE — PLAN OF CARE
Problem: Patient Care Overview  Goal: Plan of Care Review  Outcome: Ongoing (interventions implemented as appropriate)  POC reviewed with pt at 1930. Pt verbalized understanding. All questions and concerns addressed. CTA ordered overnight un obtained R/T the pt refusing to be stuck in order to obtain 18G IV.  No acute events overnight. Pt progressing toward goals. Will continue to monitor. See flowsheets for full assessment and VS info

## 2017-07-21 NOTE — ASSESSMENT & PLAN NOTE
BG goal 110-140   Continue CTS insulin infusion with hourly BG monitoring.   Order A1c.       Discharge Planning: ongoing, anticipating resolution.

## 2017-07-21 NOTE — ANESTHESIA PREPROCEDURE EVALUATION
Pre-operative evaluation for Procedure(s) (LRB):  REPLACEMENT-VALVE-AORTIC (N/A)  REPLACEMENT-VALVE-MITRAL (N/A)  REPAIR AORTIC ROOT (N/A)    Kwan Palacio is a 26 y.o. male with pmh of IV drug use, who presents from OSH with endocarditis. Pt. Previously healthy individual who began experiencing symptoms ~2 months ago, including chest pain, hemoptysis and worsening ROBERTS. Plan for above procedure today.     LDA:   20G PIV     Prev airway:   None on file      Patient Active Problem List   Diagnosis    Endocarditis    Hemoptysis       Review of patient's allergies indicates:  No Known Allergies     No current facility-administered medications on file prior to encounter.      Current Outpatient Prescriptions on File Prior to Encounter   Medication Sig Dispense Refill    meloxicam (MOBIC) 7.5 MG tablet 1-2 tablets once daily for pain 20 tablet 0       History reviewed. No pertinent surgical history.    Social History     Social History    Marital status: Significant Other     Spouse name: N/A    Number of children: N/A    Years of education: N/A     Occupational History    Not on file.     Social History Main Topics    Smoking status: Never Smoker    Smokeless tobacco: Current User     Types: Snuff    Alcohol use Yes      Comment: occas    Drug use:      Types: Methamphetamines, MDMA (Ecstacy), Benzodiazepines    Sexual activity: Yes     Partners: Female     Other Topics Concern    Not on file     Social History Narrative    No narrative on file         Vital Signs Range (Last 24H):  Temp:  [36.9 °C (98.4 °F)-38.3 °C (101 °F)]   Pulse:  []   Resp:  [14-52]   BP: (102-164)/(51-74)   SpO2:  [88 %-100 %]       CBC:   Recent Labs      07/20/17   0100   WBC  12.47  12.47   RBC  3.93*  3.93*   HGB  11.0*  11.0*   HCT  33.4*  33.4*   PLT  293  293   MCV  85  85   MCH  28.0  28.0   MCHC  32.9  32.9       CMP:   Recent Labs      07/20/17   0100  07/21/17   0249   NA  140  140  140  138   K   3.9  3.9  3.9  4.3   CL  100  100  100  101   CO2  29  29  29  21*   BUN  16  16  16  22*   CREATININE  1.1  1.1  1.1  1.2   GLU  104  104  104  89   MG  1.8  1.8   --    PHOS  4.7*  4.7*   --    CALCIUM  8.6*  8.6*  8.6*  9.3   ALBUMIN  2.9*  2.9*   --    PROT  6.5  6.5   --    ALKPHOS  84  84   --    ALT  35  35   --    AST  32  32   --    BILITOT  0.5  0.5   --        INR  Recent Labs      07/20/17   0100   INR  1.1   APTT  23.2     2D Echo:  CONCLUSIONS     1 - Eccentric hypertrophy.     2 - Normal left ventricular systolic function (EF 60-65%).     3 - No wall motion abnormalities.     4 - Normal right ventricular systolic function .     5 - Bi-leaflet aortic valve.     6 - Severe aortic regurgitation.     7 - Severe mitral regurgitation.     8 - Vegetation on the aortic and probable on the mitral valve with an abscess in the aorto-mitral fibrous curtain.     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      Anesthesia Evaluation    I have reviewed the Patient Summary Reports.    I have reviewed the Nursing Notes.   I have reviewed the Medications.     Review of Systems  Anesthesia Hx:  No previous Anesthesia  Neg history of prior surgery. Denies Family Hx of Anesthesia complications.    Social:  Smoker    Hematology/Oncology:  Hematology Normal   Oncology Normal     EENT/Dental:EENT/Dental Normal   Cardiovascular:   Valvular problems/Murmurs (severe ), AI, MR Denies CAD.    ROBERTS    Pulmonary:   hemoptysis   Renal/:  Renal/ Normal     Hepatic/GI:  Hepatic/GI Normal    Neurological:  Neurology Normal        Physical Exam  General:  Well nourished    Airway/Jaw/Neck:  Airway Findings: Mouth Opening: Normal Tongue: Normal  General Airway Assessment: Adult      Dental:  Dental Findings:   Chest/Lungs:  Chest/Lungs Findings: Clear to auscultation, Normal Respiratory Rate     Heart/Vascular:  Heart Findings: Rate:  Tachycardia  Heart Murmur  Systolic, Diastolic  Systolic Heart Murmur Description: L Upper Sternal Border, R Upper Sternal Border  Systolic Heart Murmur Grade: Grade IV or Greater  Diastolic Heart Murmur Description: Apical  Diastolic Heart Murmur Grade IV or Greater     Abdomen:  Abdomen Findings: Normal      Mental Status:  Mental Status Findings:  Cooperative, Alert and Oriented         Anesthesia Plan  Type of Anesthesia, risks & benefits discussed:  Anesthesia Type:  general  Patient's Preference:   Intra-op Monitoring Plan: standard ASA monitors, cardiac output, Troy-Brittany, central line and arterial line  Intra-op Monitoring Plan Comments:   Post Op Pain Control Plan: per primary service following discharge from PACU  Post Op Pain Control Plan Comments:   Induction:   IV  Beta Blocker:  Patient is not currently on a Beta-Blocker (No further documentation required).       Informed Consent: Patient understands risks and agrees with Anesthesia plan.  Questions answered. Anesthesia consent signed with patient.  ASA Score: 4     Day of Surgery Review of History & Physical: I have interviewed and examined the patient. I have reviewed the patient's H&P dated:            Ready For Surgery From Anesthesia Perspective.

## 2017-07-21 NOTE — HPI
Reason for Consult: Management of Hyperglycemia     Surgical Procedure and Date:  7/21/17  REPLACEMENT-VALVE-AORTIC   REPAIR VALVE-MITRAL   REPAIR AORTIC ROOT   PLACEMENT-NEOPERICARDIUM      HPI:   Patient is a 26 y.o. male  with pmh of IV drug use, who presents from OSH with a diagnosis of endocarditis. Pt. Previously healthy individual who began experiencing symptoms ~2 months ago, including chest pain, hemoptysis and worsening ROBERTS. Now S/p AVR, MVR, and aortic root repair.   No previous history of diabetes or prediabetes listed in EPIC.   Endocrine consulted for optimal BG management.

## 2017-07-21 NOTE — PROGRESS NOTES
Transported from OR to  6065 via stretcher intubated and sedated.  See flow sheet for further assessment

## 2017-07-21 NOTE — PROGRESS NOTES
[facility]  Infectious Disease  Progress Note    Patient Name: Kwan Palacio  MRN: [id number]  Admission Date: 7/19/2017  Length of Stay: 2 days  Attending Physician: Torey Gonzalez MD  Primary Care Provider: Primary Doctor No    Isolation Status: No active isolations      Subjective:     Principal Problem:Endocarditis    HPI: Pt is a 27 yo male with a recently diagnosed bicuspid aortic valve who presents to [facility] as a transfer from [facility] for streptococcus angionosis endocarditis. Symptoms began 2 months ago with fevers and malaise. Went to the ED multiple times before being admitted 2 weeks ago. Blood cultures from 07/04/2017 positive for streptococcus angionosis; TTE & DOUG demonstrated vegetations & newly diagnosed endocarditis. Patient was treated with gentamicin & rocephin. Yesterday, while in hospital, the patient developed a productive cough with hemoptysis and transferred to our hospital for evaluation by CT surgery.    Today, pt complains of continued hemoptysis. He has generalized pain worst in his throat & chest, particularly his ribs; rib pain rated 8/10 and painful with movement & inspiration. He felt fevers/chills last night, no documented fever. He denies headaches, blurry vision, n/v, or rash. He has had a 30 lb wt loss over the last two months. Positive risk factors for endocarditis include a history of drug abuse (denies Iv), tattoos & congenital heart disease (bicuspid aortic valve, recently diagnosed at [facility].) Denies history of immunesuppresion. CXR demonstrate parenchymal opacities consistent with pulmonary edema, BL PNA or pulmonary hemorrhage. 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 No leukocytosis, blood cultures NGTD.  Currently on rocephin.     Interval History: Pt was taken down to CT surgery this  morning for aortic & mitral valve debridement & reconstruction. Overnight had multiple runs of SVT within 1 hour, HR 120s. Unsuccessful 2 attempts at 18G IV with subsequent refusal by patient for 3rd attempt; unable to perform CTA. Pulmonary concerned for possible DAH vs ARDS. Febrile this morning at 101; tachypneic, tachycardic. Blood cultures NGTD, lactate 1.6.    Review of Systems   Unable to be performed, as patient was in surgery.  Objective:     Vital Signs (Most Recent):  Temp: (!) 101 °F (38.3 °C) (07/21/17 0808)  Pulse: 108 (07/21/17 0700)  Resp: (!) 26 (07/21/17 0700)  BP: 137/74 (07/21/17 0700)  SpO2: 95 % (07/21/17 0700) Vital Signs (24h Range):  Temp:  [98.4 °F (36.9 °C)-101 °F (38.3 °C)] 101 °F (38.3 °C)  Pulse:  [] 108  Resp:  [14-52] 26  SpO2:  [88 %-100 %] 95 %  BP: (102-164)/(51-74) 137/74     Weight: 68.3 kg (150 lb 9.2 oz)  Body mass index is 22.89 kg/m².    Estimated Creatinine Clearance: 90.9 mL/min (based on Cr of 1.2).    Physical Exam  Unable to be performed, as patient was in surgery.    Significant Labs:   Blood Culture:   Recent Labs  Lab 07/20/17  0130 07/20/17  0200   LABBLOO No Growth to date  No Growth to date No Growth to date  No Growth to date     CBC:   Recent Labs  Lab 07/20/17  0100  07/21/17  1208 07/21/17  1223 07/21/17  1252   WBC 12.47  12.47  --   --   --   --    HGB 11.0*  11.0*  --   --   --   --    HCT 33.4*  33.4*  < > 20* 18* 20*     293  --   --   --   --    < > = values in this interval not displayed.  CMP:   Recent Labs  Lab 07/20/17  0100 07/21/17  0249     140  140 138   K 3.9  3.9  3.9 4.3     100  100 101   CO2 29  29  29 21*     104  104 89   BUN 16  16  16 22*   CREATININE 1.1  1.1  1.1 1.2   CALCIUM 8.6*  8.6*  8.6* 9.3   PROT 6.5  6.5  --    ALBUMIN 2.9*  2.9*  --    BILITOT 0.5  0.5  --    ALKPHOS 84  84  --    AST 32  32  --    ALT 35  35  --    ANIONGAP 11  11  11 16   EGFRNONAA >60.0  >60.0   >60.0 >60.0       Significant Imaging: I have reviewed all pertinent imaging results/findings within the past 24 hours.    Assessment/Plan:      * Endocarditis    25 yo male with streptococcus anginosis endocarditis & hemoptysis  -CT surgery today for mitral & aortic valve debridement & reconstruction  -con't IV ceftriaxone 2g Q12H  -Blood cultures NGTD  -CTA chest & abdomen not acquired (unable to place 18G needle 2x and patient refused 3rd attempt.)            Nora Hess MD  Infectious Disease  [facility]

## 2017-07-21 NOTE — ANESTHESIA PROCEDURE NOTES
DOUG    Diagnosis: severe MR/AI  Patient location during procedure: OR  Procedure start time: 7/21/2017 11:01 AM  Timeout: 7/21/2017 11:00 AM  Procedure end time: 7/21/2017 11:30 AM  Surgery related to: endocarditis  Exam type: Baseline  Staffing  Anesthesiologist: ARIELLA FABIAN  Other anesthesia staff: IGNACIO ANDRES  Performed: other anesthesia staff and anesthesiologist   Preanesthetic Checklist  Completed: patient identified, surgical consent, pre-op evaluation, timeout performed, risks and benefits discussed, monitors and equipment checked, anesthesia consent given, oxygen available, suction available, hand hygiene performed and patient being monitored  Setup & Induction  Patient preparation: bite block inserted  Probe Insertion: easy  Exam: complete  Exam         LVAD:no  Estimated Ejection Fraction: > 55% normal  Regional Wall Abnormalities: no RWMA            Right Heart  Right Ventricle: normal  Right Ventricle Function: normal    Intra Atrial Septum  PFO: no shunt by color flow doppler  no IAS aneurysm  no lipomatous hypertrophy  no Atrial Septal Defect (Asd)    Right Ventricle  Size: normal    Aortic Valve:  Stenosis: none  Morphology: bicuspid aortic valve  Regurgitation: severe (4+) aortic regurgitation     Mitral Valve:  Jet Description: severe    Tricuspid Valve:  Morphology: normal  Regurgitation: none    Pulmonic Valve:  Morphology:normal  Regurgitation(color flow): none    Great Vessels  Ascending Aorta Atherosclerosis: 1=nl-min dz  Aortic Arch Atherosclerosis: 1=nl-min dz  IABP: no  Descending Aorta Atherosclerosis: 1=nl-min dz  Aorta    Descending aorta IABP: no    Effusions  Effusions: none    Summary  Findings discussed with surgeon.    Other Findings   Severe mitral valve regurgitation with a hole in the anterior mitral valve leaflet  Severe aortic insufficiency with thinning of annulus and bicuspid aortic valve  Normal biventricular size and function  Tricuspid and pulmonic valves normal  structure and function, no signs of vegetation  No PFO

## 2017-07-21 NOTE — ASSESSMENT & PLAN NOTE
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  Reports it was at its worst on 7/19 and has improved since.  He denies any leon blood clots.  Differential is broad; we only know of aortic and mitral endocarditis - no known right sided disease - if that were the case then would have to worry about septic emboli.  Primary team concerned for pulmonary edema.  Could also be DAH from infective endocarditis vs ARDS in the setting of infection.  BNP mildly elevated.    - His hemoptysis is improving - his CXR is impressive for diffuse, patchy airspace disease; a CT may better characterize the parenchymal lesions  - Treatment for his underlying condition (Infective endocarditis) would be the primary treatment for several possible etiologies on the differential - being evaluated for surgical valve replacement  - Diuresis may help resolve his hemoptysis as well - it appears to be improving clinically  - Sputum Cx

## 2017-07-21 NOTE — NURSING
Two unsuccessful peripheral IV attempts to obtain 18G for CTA. Pt states he refuses anymore IV sticks following discussion on why 18G was required to obtain a CTA. Post teaching pt remains aware of the importance of obtaining the IV yet still denying it.

## 2017-07-21 NOTE — TRANSFER OF CARE
"Anesthesia Transfer of Care Note    Patient: Kwan Palacio    Procedure(s) Performed: Procedure(s) (LRB):  REPLACEMENT-VALVE-AORTIC (N/A)  REPAIR VALVE-MITRAL (N/A)  REPAIR AORTIC ROOT (N/A)  PLACEMENT-NEOPERICARDIUM (N/A)    Patient location: PACU    Anesthesia Type: general    Transport from OR: Transported from OR intubated on 100% O2 by AMBU with adequate controlled ventilation. Continuous SpO2 monitoring in transport. Continuos invasive BP monitoring in transport. Continuous CVP monitoring in transport    Post pain: adequate analgesia    Post assessment: no apparent anesthetic complications and tolerated procedure well    Post vital signs: stable    Level of consciousness: sedated    Nausea/Vomiting: no nausea/vomiting    Complications: none    Transfer of care protocol was followed      Last vitals:   Visit Vitals  /74 (BP Location: Right arm, Patient Position: Lying, BP Method: Automatic)   Pulse 108   Temp 36.6 °C (97.8 °F) (Oral)   Resp (!) 26   Ht 5' 8" (1.727 m)   Wt 68.3 kg (150 lb 9.2 oz)   SpO2 95%   BMI 22.89 kg/m²     "

## 2017-07-21 NOTE — SUBJECTIVE & OBJECTIVE
Interval History: Reports 'hurting all over.'  Rate of hemoptysis has slowed.  Has some sputum at bedside with saliva mixed with some blood - no clot.  Unable to get 18 gauge PIV in him yesterday for CT.    Objective:     Vital Signs (Most Recent):  Temp: (!) 101 °F (38.3 °C) (07/21/17 0808)  Pulse: 108 (07/21/17 0700)  Resp: (!) 26 (07/21/17 0700)  BP: 137/74 (07/21/17 0700)  SpO2: 95 % (07/21/17 0700) Vital Signs (24h Range):  Temp:  [98.4 °F (36.9 °C)-101 °F (38.3 °C)] 101 °F (38.3 °C)  Pulse:  [] 108  Resp:  [14-52] 26  SpO2:  [88 %-100 %] 95 %  BP: (102-164)/(51-74) 137/74     Weight: 68.3 kg (150 lb 9.2 oz)  Body mass index is 22.89 kg/m².      Intake/Output Summary (Last 24 hours) at 07/21/17 0831  Last data filed at 07/21/17 0440   Gross per 24 hour   Intake              150 ml   Output              825 ml   Net             -675 ml       Physical Exam  Gen: Awake, conversant on nasal cannula  HEENT: EOMI  CV: Tachycardic, loud systolic and diastolic murmurs  Pulm: Slightly diminished BS only faint crackles at bases  Abd: Soft  Ext: No edema    Vents:       Lines/Drains/Airways     Peripheral Intravenous Line                 Peripheral IV - Single Lumen 07/19/17 0020 Left Forearm 2 days                Significant Labs:    CBC/Anemia Profile:    Recent Labs  Lab 07/20/17  0100   WBC 12.47  12.47   HGB 11.0*  11.0*   HCT 33.4*  33.4*     293   MCV 85  85   RDW 17.2*  17.2*        Chemistries:    Recent Labs  Lab 07/20/17  0100 07/21/17  0249     140  140 138   K 3.9  3.9  3.9 4.3     100  100 101   CO2 29  29  29 21*   BUN 16  16  16 22*   CREATININE 1.1  1.1  1.1 1.2   CALCIUM 8.6*  8.6*  8.6* 9.3   ALBUMIN 2.9*  2.9*  --    PROT 6.5  6.5  --    BILITOT 0.5  0.5  --    ALKPHOS 84  84  --    ALT 35  35  --    AST 32  32  --    MG 1.8  1.8  --    PHOS 4.7*  4.7*  --        ABGs: No results for input(s): PH, PCO2, HCO3, POCSATURATED, BE in the last 48  hours.  CMP:   Recent Labs  Lab 07/20/17  0100 07/21/17  0249     140  140 138   K 3.9  3.9  3.9 4.3     100  100 101   CO2 29  29  29 21*     104  104 89   BUN 16  16  16 22*   CREATININE 1.1  1.1  1.1 1.2   CALCIUM 8.6*  8.6*  8.6* 9.3   PROT 6.5  6.5  --    ALBUMIN 2.9*  2.9*  --    BILITOT 0.5  0.5  --    ALKPHOS 84  84  --    AST 32  32  --    ALT 35  35  --    ANIONGAP 11  11  11 16   EGFRNONAA >60.0  >60.0  >60.0 >60.0     Respiratory Culture: No results for input(s): GSRESP, RESPIRATORYC in the last 48 hours.    Significant Imaging:  I have reviewed all pertinent imaging results/findings within the past 24 hours.

## 2017-07-21 NOTE — SUBJECTIVE & OBJECTIVE
Remains intubated and sedated in ICU. BG trending down on CTS insulin infusion.    Eating:   NPO  Hypoglycemia and intervention: No  Fever: No  TPN and/or TF: No    PMH, PSH, FH, SH updated and reviewed     ROS:  Unable to obtain due to: IntubationReviewed ROS from note dated 7/20/2017 Dr. Rao.     Review of Systems    Current Medications and/or Treatments Impacting Glycemic Control  Immunotherapy:    Immunosuppressants     None        Steroids:   Hormones     Start     Stop Route Frequency Ordered    07/21/17 1745  vasopressin (PITRESSIN) 100 Units in dextrose 5 % 100 mL infusion      -- IV Continuous 07/21/17 1640        Pressors:    Autonomic Drugs     Start     Stop Route Frequency Ordered    07/21/17 1715  epinephrine 4 mg in sodium chloride 0.9% 250 mL infusion     Question Answer Comment   Titrate by: (in mcg/kg/min) 0.05    Titrate interval: (in minutes) 5    Titrate to maintain: (SBP or MAP or Cardiac Index) CARDIAC INDEX    Cardiac index greater than: (in L/min) 2.2    Maximum dose: (in mcg/kg/min) 2        -- IV Continuous 07/21/17 1609    07/21/17 1715  norepinephrine 4 mg in dextrose 5% 250 mL infusion (premix) (titrating)     Question Answer Comment   Titrate by: (in mcg/kg/min) 0.05    Titrate interval: (in minutes) 5    Titrate to maintain: (MAP or SBP) MAP    Greater than: (in mmHg) 60    Maximum dose: (in mcg/kg/min) 3        -- IV Continuous 07/21/17 1609        Hyperglycemia/Diabetes Medications:   Antihyperglycemics     Start     Stop Route Frequency Ordered    07/21/17 1715  insulin regular (Humulin R) 100 Units in sodium chloride 0.9% 100 mL infusion      -- IV Continuous 07/21/17 1609               PHYSICAL EXAMINATION:Vitals:    07/21/17 1715   BP:    Pulse: 103   Resp:    Temp:      Body mass index is 22.89 kg/m².    Physical Exam     PHYSICAL EXAMINATION  Constitutional:  Well developed, well nourished, NAD.  ENT: External ears no masses.  Neck:  Supple; trachea  midline.  Cardiovascular: Normal heart sounds, + ectopy, no LE edema + 2 DP pulses..     Lungs:  Intubated and mechanically ventilated; lungs anterior bilaterally clear to auscultation. Chest tubes with sanguineous drainage   Abdomen:  Soft, no masses,  no hernias. Hypoactive bowel sounds  MS: No clubbing or cyanosis of nails noted; unable to assess gait.  Skin: No rashes, lesions, or ulcers; no nodules. Sternal surgical incision CDI.   Psychiatric: DESIREE- intubated and sedated.   Neurological: DESIREE- intubated and sedated.

## 2017-07-21 NOTE — CONSULTS
[facility]  Endocrinology  Diabetes Consult Note    Consult Requested by: Naeem Fitch MD   Reason for admit: Endocarditis    HISTORY OF PRESENT ILLNESS:  Reason for Consult: Management of Hyperglycemia     Surgical Procedure and Date:  7/21/17  REPLACEMENT-VALVE-AORTIC   REPAIR VALVE-MITRAL   REPAIR AORTIC ROOT   PLACEMENT-NEOPERICARDIUM      HPI:   Patient is a 26 y.o. male  with pmh of IV drug use, who presents from OSH with a diagnosis of endocarditis. Pt. Previously healthy individual who began experiencing symptoms ~2 months ago, including chest pain, hemoptysis and worsening ROBERTS. Now S/p AVR, MVR, and aortic root repair.   No previous history of diabetes or prediabetes listed in EPIC.   Endocrine consulted for optimal BG management.                Remains intubated and sedated in ICU. BG trending down on CTS insulin infusion.    Eating:   NPO  Hypoglycemia and intervention: No  Fever: No  TPN and/or TF: No    PMH, PSH, FH, SH updated and reviewed     ROS:  Unable to obtain due to: Intubation; Reviewed ROS from note dated 7/20/2017 Dr. Rao.     Review of Systems    Current Medications and/or Treatments Impacting Glycemic Control  Immunotherapy:    Immunosuppressants     None        Steroids:   Hormones     Start     Stop Route Frequency Ordered    07/21/17 1745  vasopressin (PITRESSIN) 100 Units in dextrose 5 % 100 mL infusion      -- IV Continuous 07/21/17 1640        Pressors:    Autonomic Drugs     Start     Stop Route Frequency Ordered    07/21/17 1715  epinephrine 4 mg in sodium chloride 0.9% 250 mL infusion     Question Answer Comment   Titrate by: (in mcg/kg/min) 0.05    Titrate interval: (in minutes) 5    Titrate to maintain: (SBP or MAP or Cardiac Index) CARDIAC INDEX    Cardiac index greater than: (in L/min) 2.2    Maximum dose: (in mcg/kg/min) 2        -- IV Continuous 07/21/17 1609    07/21/17 1715  norepinephrine 4 mg in dextrose 5% 250 mL infusion (premix)  (titrating)     Question Answer Comment   Titrate by: (in mcg/kg/min) 0.05    Titrate interval: (in minutes) 5    Titrate to maintain: (MAP or SBP) MAP    Greater than: (in mmHg) 60    Maximum dose: (in mcg/kg/min) 3        -- IV Continuous 07/21/17 1609        Hyperglycemia/Diabetes Medications:   Antihyperglycemics     Start     Stop Route Frequency Ordered    07/21/17 1715  insulin regular (Humulin R) 100 Units in sodium chloride 0.9% 100 mL infusion      -- IV Continuous 07/21/17 1609               PHYSICAL EXAMINATION:Vitals:    07/21/17 1715   BP:    Pulse: 103   Resp:    Temp:      Body mass index is 22.89 kg/m².    Physical Exam     PHYSICAL EXAMINATION  Constitutional:  Well developed, well nourished, NAD.  ENT: External ears no masses.  Neck:  Supple; trachea midline.  Cardiovascular: Normal heart sounds, + ectopy, no LE edema + 2 DP pulses..     Lungs:  Intubated and mechanically ventilated; lungs anterior bilaterally clear to auscultation. Chest tubes with sanguineous drainage   Abdomen:  Soft, no masses,  no hernias. Hypoactive bowel sounds  MS: No clubbing or cyanosis of nails noted; unable to assess gait.  Skin: No rashes, lesions, or ulcers; no nodules. Sternal surgical incision CDI.   Psychiatric: DESIREE- intubated and sedated.   Neurological: DESIREE- intubated and sedated.       Labs Reviewed and Include     Recent Labs  Lab 07/21/17  1609   *   CALCIUM 7.7*   *   K 3.6  3.6   CO2 18*      BUN 21*   CREATININE 1.3     Lab Results   Component Value Date    WBC 24.23 (H) 07/21/2017    HGB 8.0 (L) 07/21/2017    HCT 22 (L) 07/21/2017    MCV 83 07/21/2017     07/21/2017     No results for input(s): TSH, FREET4 in the last 168 hours.  No results found for: HGBA1C    Nutritional status:   Body mass index is 22.89 kg/m².  Lab Results   Component Value Date    ALBUMIN 2.9 (L) 07/20/2017    ALBUMIN 2.9 (L) 07/20/2017    ALBUMIN 4.7 03/23/2015     No results found for:  PREALBUMIN    Estimated Creatinine Clearance: 83.9 mL/min (based on Cr of 1.3).    Accu-Checks  Recent Labs      07/21/17   1608   POCTGLUCOSE  198*        ASSESSMENT and PLAN    Stress hyperglycemia    BG goal 110-140   Continue CTS insulin infusion with hourly BG monitoring.   Order A1c.       Discharge Planning: ongoing, anticipating resolution.           IV drug abuse    Cause of endocarditis          * Endocarditis    Now s/p MVR and AVR          S/P AVR (aortic valve replacement)    Per CTS. Avoid hypoglycemia           S/P MVR (mitral valve repair)    Per CTS. Avoid hypoglycemia.               Plan discussed with RN at bedside.     Kathy Suggs NP  Endocrinology  [facility]

## 2017-07-21 NOTE — H&P
History & Physical  Surgical Intensive Care    SUBJECTIVE:     Chief Complaint/Reason for Admission: infective endocarditis    History of Present Illness:  Patient is a 26 y.o. male  transferred from OSH for surgical evaluation of infective endocarditis. He has been on micafungin, Vanc, Gentamicin, and Rocephin abx therapy. He had a DOUG at the [facility] but is not in his chart. He was taken to the OR today by CTS. He had replacement of the Aortic valve, repair of the mitral valve and aortic root, and placement of neopericardium.    He presents to the SICU sedated and intubated on 70% and PEEP of 5, requiring 0.06 of levo, 0.08 of epi, and 0.04 of vaso.     PTA Medications   Medication Sig    meloxicam (MOBIC) 7.5 MG tablet 1-2 tablets once daily for pain       Review of patient's allergies indicates:  No Known Allergies    Past Medical History:   Diagnosis Date    Dislocation of shoulder, left, closed      History reviewed. No pertinent surgical history.  History reviewed. No pertinent family history.  Social History   Substance Use Topics    Smoking status: Never Smoker    Smokeless tobacco: Current User     Types: Snuff    Alcohol use Yes      Comment: occas        Review of Systems:  Review of systems not obtained due to patient factors intubated and sedated.    OBJECTIVE:     Vital Signs (Most Recent)  Temp: 97.8 °F (36.6 °C) (07/21/17 1700)  Pulse: 103 (07/21/17 1715)  Resp: 18 (07/21/17 1704)  BP: (!) 96/51 (07/21/17 1704)  SpO2: 98 % (07/21/17 1715)  Ventilator Data (Last 24H):     Vent Mode: A/C  Oxygen Concentration (%):  [50-70] 50  Resp Rate Total:  [18 br/min-21 br/min] 21 br/min  Vt Set:  [500 mL] 500 mL  PEEP/CPAP:  [5 cmH20] 5 cmH20  Pressure Support:  [0 cmH20] 0 cmH20  Mean Airway Pressure:  [8.4 cmH20] 8.4 cmH20    Hemodynamic Parameters (Last 24H):  PAP: (29-36)/(13-20) 29/16  PAP (Mean):  [22 mmHg-26 mmHg] 22 mmHg  CO:  [7.2 L/min-7.6 L/min] 7.2 L/min  CI:  [4 L/min/m2-4.2 L/min/m2] 4  L/min/m2    Physical Exam:  Physical Exam   Constitutional: He appears well-developed and well-nourished.   HENT:   Head: Normocephalic and atraumatic.   Neck: No tracheal deviation present.   He is intubated   Cardiovascular: Normal rate.    Pulmonary/Chest: Breath sounds normal. No respiratory distress.   Abdominal: Soft. He exhibits no distension and no mass. There is no tenderness.   Neurological:   He is sedated   Skin: Skin is warm and dry. No rash noted.   Psychiatric:   He is sedated         Lines/Drains:       Pulmonary Artery Catheter Assessment  07/21/17 1021 (Active)   Dressing biopatch in place;dressing dry and intact 7/21/2017  4:10 PM   Securement secured w/ sutures 7/21/2017  4:10 PM   Phlebitis 0-->no symptoms 7/21/2017  4:10 PM   Infiltration 0-->no symptoms 7/21/2017  4:10 PM   Waveform normal 7/21/2017  4:10 PM   Pressure Catheter Interventions line leveled/zeroed 7/21/2017  4:10 PM   Prox Injectate Status Flushed 7/21/2017  4:10 PM   Prox Infusate Status Flushed 7/21/2017  4:10 PM   Distal Status Flushed 7/21/2017  4:10 PM   Daily Line Review Performed 7/21/2017  4:10 PM   Number of days: 0            Percutaneous Central Line Insertion/Assessment - double lumen  07/21/17 1600 right internal jugular (Active)   Dressing biopatch in place;dressing dry and intact 7/21/2017  4:10 PM   Securement secured w/ sutures 7/21/2017  4:10 PM   Additional Site Signs no erythema;no warmth;no edema;no pain;no drainage 7/21/2017  4:10 PM   Distal Patency/Care flushed w/o difficulty 7/21/2017  4:10 PM   Proximal Patency/Care flushed w/o difficulty 7/21/2017  4:10 PM   Waveform normal 7/21/2017  4:10 PM   Number of days: 0            Peripheral IV - Single Lumen 07/19/17 0020 Left Forearm (Active)   Site Assessment Clean;Dry;Intact;No redness;No swelling 7/21/2017  4:10 PM   Line Status Flushed 7/21/2017  4:10 PM   Dressing Status Clean;Dry;Intact 7/21/2017  4:10 PM   Dressing Intervention Dressing reinforced  7/20/2017  3:00 AM   Dressing Change Due 07/23/17 7/21/2017  3:01 AM   Site Change Due 07/23/17 7/21/2017  7:00 AM   Reason Not Rotated Not due 7/21/2017  7:00 AM   Number of days: 2            Peripheral IV - Single Lumen 07/21/17 1000 Left Antecubital (Active)   Site Assessment Clean;Dry;Intact;No redness;No swelling 7/21/2017  4:10 PM   Line Status Flushed 7/21/2017  4:10 PM   Dressing Status Clean;Dry;Intact 7/21/2017  4:10 PM   Number of days: 0            Peripheral IV - Single Lumen 07/21/17 0956 Right Antecubital (Active)   Site Assessment Clean;Dry;Intact;No redness;No swelling 7/21/2017  4:10 PM   Line Status Flushed 7/21/2017  4:10 PM   Dressing Status Clean;Dry;Intact 7/21/2017  4:10 PM   Number of days: 0            Arterial Line 07/21/17 1011 Left Radial (Active)   Site Assessment Clean;Dry;Intact;No redness;No swelling 7/21/2017  4:10 PM   Art Line Waveform Appropriate 7/21/2017  4:10 PM   Arterial Line Interventions Leveled 7/21/2017  4:10 PM   Color/Movement/Sensation Capillary refill less than 3 sec 7/21/2017  4:10 PM   Dressing Type Transparent 7/21/2017  4:10 PM   Dressing Status Clean;Dry;Intact 7/21/2017  4:10 PM   Number of days: 0            Arterial Line 07/21/17 1600 Right Femoral (Active)   Site Assessment Clean;Dry;Intact;No redness;No swelling 7/21/2017  4:10 PM   Line Status Pulsatile blood flow 7/21/2017  4:10 PM   Art Line Waveform Appropriate 7/21/2017  4:10 PM   Arterial Line Interventions Zeroed and calibrated;Leveled 7/21/2017  4:10 PM   Color/Movement/Sensation Capillary refill less than 3 sec 7/21/2017  4:10 PM   Dressing Type Transparent 7/21/2017  4:10 PM   Number of days: 0            Urethral Catheter 07/21/17 1015 Non-latex;Straight-tip;Temperature probe 16 Fr. (Active)   Output (mL) 75 mL 7/21/2017  5:00 PM   Number of days: 0            Y Chest Tube 1 and 2 07/21/17 1432 1 Mediastinal 19 Fr. 2 Mediastinal 19 Fr. (Active)   Output (mL) 10 mL 7/21/2017  4:25 PM   Number of  days: 0       Laboratory  CBC:   Recent Labs  Lab 07/21/17  1609 07/21/17  1609   WBC 24.23*  --    RBC 2.98*  --    HGB 8.0*  --    HCT 24.6* 22*     --    MCV 83  --    MCH 26.8*  --    MCHC 32.5  --      CMP:   Recent Labs  Lab 07/20/17  0100  07/21/17  1609     104  104  < > 172*   CALCIUM 8.6*  8.6*  8.6*  < > 7.7*   ALBUMIN 2.9*  2.9*  --   --    PROT 6.5  6.5  --   --      140  140  < > 135*   K 3.9  3.9  3.9  < > 3.6  3.6   CO2 29  29  29  < > 18*     100  100  < > 102   BUN 16  16  16  < > 21*   CREATININE 1.1  1.1  1.1  < > 1.3   ALKPHOS 84  84  --   --    ALT 35  35  --   --    AST 32  32  --   --    BILITOT 0.5  0.5  --   --    < > = values in this interval not displayed.  Coagulation:   Recent Labs  Lab 07/21/17  1609   LABPROT 12.6*   INR 1.2   APTT 31.8     Cardiac markers: No results for input(s): CKMB, CPKMB, TROPONINT, TROPONINI, MYOGLOBIN in the last 168 hours.  Microbiology Results (last 7 days)     Procedure Component Value Units Date/Time    Gram stain [[id number]] Collected:  07/21/17 1212    Order Status:  Completed Specimen:  Tissue from Heart Updated:  07/21/17 1344     Gram Stain Result No WBC's      No organisms seen    Narrative:       Vegetative growth off of mitral valve.    Culture, Anaerobe [[id number]] Collected:  07/21/17 1212    Order Status:  Sent Specimen:  Tissue from Heart Updated:  07/21/17 1246    Fungus culture [[id number]] Collected:  07/21/17 1212    Order Status:  Sent Specimen:  Tissue from Heart Updated:  07/21/17 1245    Aerobic culture [[id number]] Collected:  07/21/17 1212    Order Status:  Sent Specimen:  Tissue from Heart Updated:  07/21/17 1245    AFB Culture & Smear [[id number]] Collected:  07/21/17 1212    Order Status:  Sent Specimen:  Tissue from Heart Updated:  07/21/17 1244    Culture, Respiratory with Gram Stain [[id number]]     Order Status:  Canceled Specimen:  Respiratory from Sputum, Expectorated     Blood  culture [[id number]] Collected:  07/20/17 0130    Order Status:  Completed Specimen:  Blood from Peripheral, Antecubital, Left Updated:  07/21/17 0613     Blood Culture, Routine No Growth to date     Blood Culture, Routine No Growth to date    Blood culture [[id number]] Collected:  07/20/17 0200    Order Status:  Completed Specimen:  Blood from Peripheral, Forearm, Left Updated:  07/21/17 0613     Blood Culture, Routine No Growth to date     Blood Culture, Routine No Growth to date          Chest X-Ray: I personally reviewed the films and findings are: stable, medistinum wider than pre-op CXR    ASSESSMENT/PLAN:     Plan:    Neuro:   -IV fentanyl PRN  -Sedation propofol gtt    Pulmonary:   -intubated, wean to extubate today  Vent Mode: A/C  Oxygen Concentration (%):  [50-70] 50  Resp Rate Total:  [18 br/min-21 br/min] 21 br/min  Vt Set:  [500 mL] 500 mL  PEEP/CPAP:  [5 cmH20] 5 cmH20  Pressure Support:  [0 cmH20] 0 cmH20  Mean Airway Pressure:  [8.4 cmH20] 8.4 cmH20    Recent Labs  Lab 07/21/17  1609   PH 7.311*   PCO2 46.7*   PO2 236*   HCO3 23.6*   POCSATURATED 100   BE -3     Meds chest tube - monitor output    Cardiac:  -MAP range goal >65  -0.06 of levo, 0.08 of epi, and 0.04 of vaso.  -wean pressors    Renal:   -Nguyen in place  -Bun/Cr  21/1.2, baseline around 1.1  -UOP 0.7 cc/kg/hr over the last 12  -Lactate 1.6 yesterday    Fluids/Electrolytes/Nutrition/GI:   -Nutritional status: NPO for now  -reassess after extubation    Hematology/Oncology:  -H/H, 8.0/24.6 from 11/33, continue to monitor closely  -INR/Plts 1.2/220    Infectious Disease:   -101 yesterday  -Infectious endocarditis  -WBC 12.47 -> 24.23 post op, continue to monitor  -rocephin 2g q12  -Cultures NGTD    Endocrine:  -Glucose range, goal of 120-150    Dispo:  -Continue care in the ICU setting    Rayray Christina PGY-1  [phone]  07/21/2017

## 2017-07-21 NOTE — PROGRESS NOTES
[facility]  Pulmonology  Progress Note    Patient Name: Kwan Palacio  MRN: [id number]  Admission Date: 7/19/2017  Hospital Length of Stay: 2 days  Code Status: Full Code  Attending Provider: Torey Gonzalez MD  Primary Care Provider: Primary Doctor No   Principal Problem: Endocarditis    Subjective:     Interval History: Reports 'hurting all over.'  Rate of hemoptysis has slowed.  Has some sputum at bedside with saliva mixed with some blood - no clot.  Unable to get 18 gauge PIV in him yesterday for CT.    Objective:     Vital Signs (Most Recent):  Temp: (!) 101 °F (38.3 °C) (07/21/17 0808)  Pulse: 108 (07/21/17 0700)  Resp: (!) 26 (07/21/17 0700)  BP: 137/74 (07/21/17 0700)  SpO2: 95 % (07/21/17 0700) Vital Signs (24h Range):  Temp:  [98.4 °F (36.9 °C)-101 °F (38.3 °C)] 101 °F (38.3 °C)  Pulse:  [] 108  Resp:  [14-52] 26  SpO2:  [88 %-100 %] 95 %  BP: (102-164)/(51-74) 137/74     Weight: 68.3 kg (150 lb 9.2 oz)  Body mass index is 22.89 kg/m².      Intake/Output Summary (Last 24 hours) at 07/21/17 0831  Last data filed at 07/21/17 0440   Gross per 24 hour   Intake              150 ml   Output              825 ml   Net             -675 ml       Physical Exam  Gen: Awake, conversant on nasal cannula  HEENT: EOMI  CV: Tachycardic, loud systolic and diastolic murmurs  Pulm: Slightly diminished BS only faint crackles at bases  Abd: Soft  Ext: No edema    Vents:       Lines/Drains/Airways     Peripheral Intravenous Line                 Peripheral IV - Single Lumen 07/19/17 0020 Left Forearm 2 days                Significant Labs:    CBC/Anemia Profile:    Recent Labs  Lab 07/20/17  0100   WBC 12.47  12.47   HGB 11.0*  11.0*   HCT 33.4*  33.4*     293   MCV 85  85   RDW 17.2*  17.2*        Chemistries:    Recent Labs  Lab 07/20/17  0100 07/21/17  0249     140  140 138   K 3.9  3.9  3.9 4.3     100  100 101   CO2 29  29  29 21*   BUN 16  16  16 22*    CREATININE 1.1  1.1  1.1 1.2   CALCIUM 8.6*  8.6*  8.6* 9.3   ALBUMIN 2.9*  2.9*  --    PROT 6.5  6.5  --    BILITOT 0.5  0.5  --    ALKPHOS 84  84  --    ALT 35  35  --    AST 32  32  --    MG 1.8  1.8  --    PHOS 4.7*  4.7*  --        ABGs: No results for input(s): PH, PCO2, HCO3, POCSATURATED, BE in the last 48 hours.  CMP:   Recent Labs  Lab 07/20/17  0100 07/21/17  0249     140  140 138   K 3.9  3.9  3.9 4.3     100  100 101   CO2 29  29  29 21*     104  104 89   BUN 16  16  16 22*   CREATININE 1.1  1.1  1.1 1.2   CALCIUM 8.6*  8.6*  8.6* 9.3   PROT 6.5  6.5  --    ALBUMIN 2.9*  2.9*  --    BILITOT 0.5  0.5  --    ALKPHOS 84  84  --    AST 32  32  --    ALT 35  35  --    ANIONGAP 11  11  11 16   EGFRNONAA >60.0  >60.0  >60.0 >60.0     Respiratory Culture: No results for input(s): GSRESP, RESPIRATORYC in the last 48 hours.    Significant Imaging:  I have reviewed all pertinent imaging results/findings within the past 24 hours.    Assessment/Plan:     Hemoptysis    26yoM with infective endocarditis 2/2 strep angionosis with hemoptysis over the past 2 days.  Reports it was at its worst on 7/19 and has improved since.  He denies any leon blood clots.  Differential is broad; we only know of aortic and mitral endocarditis - no known right sided disease - if that were the case then would have to worry about septic emboli.  Primary team concerned for pulmonary edema.  Could also be DAH from infective endocarditis vs ARDS in the setting of infection.  BNP mildly elevated.    - His hemoptysis is improving - his CXR is impressive for diffuse, patchy airspace disease; a CT may better characterize the parenchymal lesions  - Treatment for his underlying condition (Infective endocarditis) would be the primary treatment for several possible etiologies on the differential - being evaluated for surgical valve replacement  - Diuresis may help resolve his hemoptysis as  well - it appears to be improving clinically  - Sputum Cx        * Endocarditis    Treatment per primary team and  ID recommendations w/ evaluation for source control with surgical valve replacement and abscess drainagae          Will sign off for now.  Please call with questions.     Jose Roberto Dyson MD  Pulmonology  [facility]

## 2017-07-21 NOTE — ANESTHESIA PROCEDURE NOTES
[place]    Diagnosis: AR, MR  Patient location during procedure: done in OR  Procedure start time: 7/21/2017 10:21 AM  Timeout: 7/21/2017 10:20 AM  Procedure end time: 7/21/2017 10:30 AM  Staffing  Anesthesiologist: ARIELLA FABIAN  Resident/CRNA: SANDRA RODRIGUES  Performed: resident/CRNA   Anesthesiologist was present at the time of the procedure.  Preanesthetic Checklist  Completed: patient identified, site marked, surgical consent, pre-op evaluation, timeout performed, IV checked, risks and benefits discussed, monitors and equipment checked and anesthesia consent given  [place]  Skin Prep: chlorhexidine gluconate  Local Infiltration: none  Location: right,  internal jugular vein  Vessel Caliber: medium, patent, compressibility normal  Introducer: 9 Fr single lumen, manometry used.  Device: CCO/Oximetric Catheter  Catheter Size: 8 Fr  Catheter placement by yes. Heme positive aspiration all ports. PAC floated with balloon up not wedgedSterile sheath used  Locked at: 45 cm.Insertion Attempts: 1  Indication: intravenous therapy, hemodynamic monitoring  Ultrasound Guidance  Needle advanced into vessel with real time Ultrasound guidance.  Guidewire confirmed in vessel.  Sterile sheath used.  Assessment  Central Line Bundle Protocol followed. Hand hygiene before procedure, surgical cap worn, surgical mask worn, sterile surgical gloves worn, large sterile drape used.  Verification: blood return  Dressing: secured with tape and tegaderm  Patient: Tolerated Well

## 2017-07-21 NOTE — CONSULTS
"Consult Note  Cardiothoracic Surgery    Inpatient consult to Cardiothoracic Surgery  Consult performed by: SABINO EDWARD  Consult ordered by: DICKSON HENRY        SUBJECTIVE:     History of Present Illness:  Mr Palacio is a 26 year old male who was transferred in from OSH for surgical evaluation for endocarditis. He states he has felt poorly for the last 2 months with fevers and generalized malaise. He went to the ED several times and was told he had a respiratory virus. He then was finally admitted about 2 weeks ago.     Today he states he was transferred because he had been "coughing up blood." He was transferred with limited records but the nurse did call the OSH to find out he has been on micafungin, Vanc, Gentamicin, and Rocephin abx therapy. In addition he had been getting a cough suppressant and prn xanax for anxiety. He also had a DOUG, but we don't have a copy of the study or report. He states that his only complaint currently is that he has a sore throat from coughing.     He states his only home medication is methadone.      The patient states he does have some type of congenital cardiac defect that was discovered recently. Again, no record of this. He states it is a "missing valve."  2Decho at [facility] shows a bicuspid aortic valve.     Scheduled Meds:   cefTRIAXone (ROCEPHIN) IVPB  2 g Intravenous Q12H    furosemide  20 mg Intravenous BID    heparin (porcine)  5,000 Units Subcutaneous Q8H    nicotine  1 patch Transdermal Daily    pantoprazole  40 mg Oral Daily    sodium chloride 0.9%  3 mL Intravenous Q8H     Infusions/Drips:   PRN Meds:(Magic mouthwash) 1:1:1 Benadryl 12.5mg/5ml liq, aluminum & magnesium hydroxide-simehticone (Maalox), lidocaine viscous 2%, acetaminophen, alprazolam, alprazolam, hydrocodone-acetaminophen 5-325mg, ondansetron    Review of patient's allergies indicates:  No Known Allergies    Past Medical History:   Diagnosis Date    Dislocation of shoulder, left, closed      History " reviewed. No pertinent surgical history.  History reviewed. No pertinent family history.  Social History   Substance Use Topics    Smoking status: Never Smoker    Smokeless tobacco: Current User     Types: Snuff    Alcohol use Yes      Comment: occas        Review of Systems:  Review of Systems   Constitutional: Negative for fever and malaise/fatigue.   HENT: Negative for congestion and sore throat.    Eyes: Negative for blurred vision, double vision and discharge.   Respiratory: Positive for cough, shortness of breath.    Cardiovascular: Negative for chest pain, palpitations, claudication, leg swelling and PND.   Gastrointestinal: Negative for abdominal pain, constipation, diarrhea, nausea and vomiting.   Genitourinary: Negative for dysuria, frequency and urgency.   Musculoskeletal: Negative for back pain and myalgias.   Skin: Negative for itching and rash.   Neurological: Negative for dizziness, speech change, seizures, weakness and headaches.   Endo/Heme/Allergies: Does not bruise/bleed easily.   Psychiatric/Behavioral: Negative for depression. The patient is not nervous/anxious.      OBJECTIVE:     Vital Signs (Most Recent)  Temp: 99.3 °F (37.4 °C) (07/21/17 0700)  Pulse: 108 (07/21/17 0700)  Resp: (!) 26 (07/21/17 0700)  BP: 137/74 (07/21/17 0700)  SpO2: 95 % (07/21/17 0700)    Admission Weight: 68.3 kg (150 lb 9.2 oz) (07/20/17 0059)   Most Recent Weight: 68.3 kg (150 lb 9.2 oz) (07/20/17 0059)    Vital Signs Range (Last 24H):  Temp:  [98.4 °F (36.9 °C)-99.3 °F (37.4 °C)]   Pulse:  []   Resp:  [14-52]   BP: (102-164)/(51-74)   SpO2:  [88 %-100 %]     Physical Exam:  Constitutional: Patient appears well-developed and well-nourished.   HENT:   Head: Normocephalic and atraumatic.   Eyes: Pupils are equal, round, and reactive to light.   Neck: Normal range of motion. Neck supple.   Cardiovascular: Normal rate, regular rhythm and normal heart sounds.    Pulmonary/Chest: Effort normal and breath sounds  normal.   Abdominal: Soft. Bowel sounds are normal.   Musculoskeletal: Normal range of motion.   Neurological: Alert and oriented to person, place, and time.   Skin: Skin is warm and dry.   Psychiatric: Normal mood and affect. Behavior is normal.     Laboratory:  CBC:     Recent Labs  Lab 07/20/17  0100   WBC 12.47  12.47   RBC 3.93*  3.93*   HGB 11.0*  11.0*   HCT 33.4*  33.4*     293   MCV 85  85   MCH 28.0  28.0   MCHC 32.9  32.9     BMP:     Recent Labs  Lab 07/20/17  0100 07/21/17  0249     104  104 89     140  140 138   K 3.9  3.9  3.9 4.3     100  100 101   CO2 29  29  29 21*   BUN 16  16  16 22*   CREATININE 1.1  1.1  1.1 1.2   CALCIUM 8.6*  8.6*  8.6* 9.3   MG 1.8  1.8  --        Diagnostic Results:  2D ECHO 7/20  Aortic Valve: valve is thickened. The aortic valve is bi-leaflet in structure. Additionally, there is severe aortic regurgitation. 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 There is severe mitral regurgitation. 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  There are two jets of MR, one through the perforation and another, central.     ASSESSMENT/PLAN:   Mr Palacio is a 26 year old male who was transferred in from [facility] for surgical evaluation for endocarditis.    PLAN: Dr. Fitch to staff, please await staff addendum.     Update 7/21/2017     After reviewing the available information we feel he has aortic and mitral endocarditis requiring surgery. There is a fair likelihood of an abscess requiring debridement and reconstruction. I have explained the procedure, including indications, risks, and alternatives.  Kwan Palacio gave informed consent and is medically ready for surgery.     Javier Khan D.O.   Fellow in  Cardiothoracic Surgery  PG VI  Pg [phone]  7/21/2017 8:16 AM    CTS Attending Note:    I have personally taken the history and examined this patient and agree with the resident's note as stated above. See my note from yesterday. Plan operation today. Family and patient informed in detail including risks.

## 2017-07-21 NOTE — ASSESSMENT & PLAN NOTE
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

## 2017-07-21 NOTE — BRIEF OP NOTE
[facility]  Brief Operative Note    SUMMARY     Surgery Date: 7/21/2017     Surgeon(s) and Role:     * Javier Khan MD - Fellow     * Naeem Fitch MD - Primary    Assisting Surgeon: None    Pre-op Diagnosis:  Acute bacterial endocarditis [I33.0]  Endocarditis [I38]  AV block, 1st degree [I44.0]    Post-op Diagnosis:  Post-Op Diagnosis Codes:     * Acute bacterial endocarditis [I33.0]     * Endocarditis [I38]     * AV block, 1st degree [I44.0]    Procedure(s) (LRB):  REPLACEMENT-VALVE-AORTIC (N/A) [id number]  REPAIR VALVE-MITRAL (N/A) [id number]  REPAIR AORTIC ROOT (N/A) [id number]  PLACEMENT-NEOPERICARDIUM (N/A)     Anesthesia: General    Description of Procedure: Bicaval cannulation, repair of drop lesion on anterior leaflet mitral valve through aortotomy with bovine pericardial patch, replace aortic with 23 Epic tissue valve, enlarge aortic root with bovine patch, create neopericardium with bovine pericardium    Description of the findings of the procedure: Cleared infection of nocoronary/left cusp, drop lesion with perforation of mitral valve anterior leaflet    Estimated Blood Loss: 150 ml         Specimens:   Specimen (12h ago through future)    Start     Ordered    07/21/17 1435  Specimen to Pathology - Surgery  Once     Comments:  1. Aortic valve leaflets (permanent)      07/21/17 1435    07/21/17 1427  Specimen to Pathology - Surgery  Once     Comments:  1. Aortic valve leaflets - permanent      07/21/17 1427        Culture of mitral valve lesion

## 2017-07-21 NOTE — PLAN OF CARE
07/21/17 1148   Discharge Assessment   Assessment Type Discharge Planning Reassessment   Discharge Plan A Home with family   Discharge Plan B Home with family;Home Health     Consult to [facility] Dr. Fitch for valve repair/replacement for MVR, AVR. Unsure of whether a porcine valve or mechanical valve will utilized. The patient admits to having poor access to medical care. History of [place] work. Has family support and transportation. Called report to Bishop Montes RN/CCM for [facility] service to notify of plan OR procedure today with transfer of primary service to [facility]. Depending on type of valve utilized the patient may need coumadin clinic w/frequent lab draws upon discharge. Goal is to discharge home with family at this time. He may require home health depending on his recovery. No other needs assessed at this time.

## 2017-07-22 PROBLEM — I44.0 AV BLOCK, 1ST DEGREE: Status: RESOLVED | Noted: 2017-07-21 | Resolved: 2017-07-22

## 2017-07-22 LAB
ANION GAP SERPL CALC-SCNC: 8 MMOL/L
ANISOCYTOSIS BLD QL SMEAR: SLIGHT
APTT BLDCRRT: 25.1 SEC
BASOPHILS # BLD AUTO: 0.01 K/UL
BASOPHILS NFR BLD: 0.1 %
BUN SERPL-MCNC: 20 MG/DL
CALCIUM SERPL-MCNC: 7.9 MG/DL
CHLORIDE SERPL-SCNC: 101 MMOL/L
CO2 SERPL-SCNC: 21 MMOL/L
CREAT SERPL-MCNC: 1 MG/DL
DIFFERENTIAL METHOD: ABNORMAL
EOSINOPHIL # BLD AUTO: 0 K/UL
EOSINOPHIL NFR BLD: 0 %
ERYTHROCYTE [DISTWIDTH] IN BLOOD BY AUTOMATED COUNT: 16.5 %
EST. GFR  (AFRICAN AMERICAN): >60 ML/MIN/1.73 M^2
EST. GFR  (NON AFRICAN AMERICAN): >60 ML/MIN/1.73 M^2
ESTIMATED AVG GLUCOSE: 94 MG/DL
GLUCOSE SERPL-MCNC: 108 MG/DL
GLUCOSE SERPL-MCNC: 211 MG/DL (ref 70–110)
GLUCOSE SERPL-MCNC: 220 MG/DL (ref 70–110)
HBA1C MFR BLD HPLC: 4.9 %
HCO3 UR-SCNC: 23.8 MMOL/L (ref 24–28)
HCO3 UR-SCNC: 25.3 MMOL/L (ref 24–28)
HCT VFR BLD AUTO: 27.9 %
HCT VFR BLD CALC: 18 %PCV (ref 36–54)
HCT VFR BLD CALC: 25 %PCV (ref 36–54)
HGB BLD-MCNC: 9.3 G/DL
HYPOCHROMIA BLD QL SMEAR: ABNORMAL
INR PPP: 1
LYMPHOCYTES # BLD AUTO: 1.9 K/UL
LYMPHOCYTES NFR BLD: 15.2 %
MAGNESIUM SERPL-MCNC: 1.8 MG/DL
MCH RBC QN AUTO: 27.4 PG
MCHC RBC AUTO-ENTMCNC: 33.3 G/DL
MCV RBC AUTO: 82 FL
MONOCYTES # BLD AUTO: 1.4 K/UL
MONOCYTES NFR BLD: 10.6 %
NEUTROPHILS # BLD AUTO: 9.4 K/UL
NEUTROPHILS NFR BLD: 74.1 %
PCO2 BLDA: 35.2 MMHG (ref 35–45)
PCO2 BLDA: 36.2 MMHG (ref 35–45)
PH SMN: 7.44 [PH] (ref 7.35–7.45)
PH SMN: 7.45 [PH] (ref 7.35–7.45)
PHOSPHATE SERPL-MCNC: 3.6 MG/DL
PLATELET # BLD AUTO: 214 K/UL
PLATELET BLD QL SMEAR: ABNORMAL
PMV BLD AUTO: 9.6 FL
PO2 BLDA: 267 MMHG (ref 80–100)
PO2 BLDA: 357 MMHG (ref 80–100)
POC BE: 0 MMOL/L
POC BE: 1 MMOL/L
POC IONIZED CALCIUM: 0.94 MMOL/L (ref 1.06–1.42)
POC IONIZED CALCIUM: 1.3 MMOL/L (ref 1.06–1.42)
POC SATURATED O2: 100 % (ref 95–100)
POC SATURATED O2: 100 % (ref 95–100)
POC TCO2: 25 MMOL/L (ref 23–27)
POC TCO2: 26 MMOL/L (ref 23–27)
POCT GLUCOSE: 105 MG/DL (ref 70–110)
POCT GLUCOSE: 108 MG/DL (ref 70–110)
POCT GLUCOSE: 111 MG/DL (ref 70–110)
POCT GLUCOSE: 115 MG/DL (ref 70–110)
POCT GLUCOSE: 117 MG/DL (ref 70–110)
POCT GLUCOSE: 124 MG/DL (ref 70–110)
POCT GLUCOSE: 126 MG/DL (ref 70–110)
POCT GLUCOSE: 126 MG/DL (ref 70–110)
POCT GLUCOSE: 127 MG/DL (ref 70–110)
POCT GLUCOSE: 137 MG/DL (ref 70–110)
POCT GLUCOSE: 190 MG/DL (ref 70–110)
POCT GLUCOSE: 212 MG/DL (ref 70–110)
POLYCHROMASIA BLD QL SMEAR: ABNORMAL
POTASSIUM BLD-SCNC: 2.6 MMOL/L (ref 3.5–5.1)
POTASSIUM BLD-SCNC: 5.3 MMOL/L (ref 3.5–5.1)
POTASSIUM SERPL-SCNC: 4.9 MMOL/L
POTASSIUM SERPL-SCNC: 5.3 MMOL/L
POTASSIUM SERPL-SCNC: 5.5 MMOL/L
POTASSIUM SERPL-SCNC: 5.6 MMOL/L
POTASSIUM SERPL-SCNC: 6 MMOL/L
POTASSIUM SERPL-SCNC: 6.5 MMOL/L
PROTHROMBIN TIME: 11 SEC
RBC # BLD AUTO: 3.4 M/UL
SAMPLE: ABNORMAL
SAMPLE: ABNORMAL
SODIUM BLD-SCNC: 130 MMOL/L (ref 136–145)
SODIUM BLD-SCNC: 132 MMOL/L (ref 136–145)
SODIUM SERPL-SCNC: 130 MMOL/L
WBC # BLD AUTO: 12.68 K/UL

## 2017-07-22 PROCEDURE — 84132 ASSAY OF SERUM POTASSIUM: CPT

## 2017-07-22 PROCEDURE — 99232 SBSQ HOSP IP/OBS MODERATE 35: CPT | Mod: ,,, | Performed by: SURGERY

## 2017-07-22 PROCEDURE — 97530 THERAPEUTIC ACTIVITIES: CPT

## 2017-07-22 PROCEDURE — 97161 PT EVAL LOW COMPLEX 20 MIN: CPT

## 2017-07-22 PROCEDURE — 63600175 PHARM REV CODE 636 W HCPCS: Performed by: STUDENT IN AN ORGANIZED HEALTH CARE EDUCATION/TRAINING PROGRAM

## 2017-07-22 PROCEDURE — 94770 HC EXHALED C02 TEST: CPT

## 2017-07-22 PROCEDURE — 99232 SBSQ HOSP IP/OBS MODERATE 35: CPT | Mod: ,,, | Performed by: INTERNAL MEDICINE

## 2017-07-22 PROCEDURE — 99233 SBSQ HOSP IP/OBS HIGH 50: CPT | Mod: ,,, | Performed by: INTERNAL MEDICINE

## 2017-07-22 PROCEDURE — 25000003 PHARM REV CODE 250: Performed by: STUDENT IN AN ORGANIZED HEALTH CARE EDUCATION/TRAINING PROGRAM

## 2017-07-22 PROCEDURE — 94640 AIRWAY INHALATION TREATMENT: CPT

## 2017-07-22 PROCEDURE — 99900035 HC TECH TIME PER 15 MIN (STAT)

## 2017-07-22 PROCEDURE — 85025 COMPLETE CBC W/AUTO DIFF WBC: CPT

## 2017-07-22 PROCEDURE — 20000000 HC ICU ROOM

## 2017-07-22 PROCEDURE — 85730 THROMBOPLASTIN TIME PARTIAL: CPT

## 2017-07-22 PROCEDURE — 27000221 HC OXYGEN, UP TO 24 HOURS

## 2017-07-22 PROCEDURE — 25000003 PHARM REV CODE 250: Performed by: THORACIC SURGERY (CARDIOTHORACIC VASCULAR SURGERY)

## 2017-07-22 PROCEDURE — 63600175 PHARM REV CODE 636 W HCPCS: Performed by: THORACIC SURGERY (CARDIOTHORACIC VASCULAR SURGERY)

## 2017-07-22 PROCEDURE — 84100 ASSAY OF PHOSPHORUS: CPT

## 2017-07-22 PROCEDURE — 83735 ASSAY OF MAGNESIUM: CPT

## 2017-07-22 PROCEDURE — 83036 HEMOGLOBIN GLYCOSYLATED A1C: CPT

## 2017-07-22 PROCEDURE — 27100171 HC OXYGEN HIGH FLOW UP TO 24 HOURS

## 2017-07-22 PROCEDURE — 94761 N-INVAS EAR/PLS OXIMETRY MLT: CPT

## 2017-07-22 PROCEDURE — 80048 BASIC METABOLIC PNL TOTAL CA: CPT

## 2017-07-22 PROCEDURE — 97166 OT EVAL MOD COMPLEX 45 MIN: CPT

## 2017-07-22 PROCEDURE — 25000242 PHARM REV CODE 250 ALT 637 W/ HCPCS: Performed by: THORACIC SURGERY (CARDIOTHORACIC VASCULAR SURGERY)

## 2017-07-22 PROCEDURE — 85610 PROTHROMBIN TIME: CPT

## 2017-07-22 PROCEDURE — 97802 MEDICAL NUTRITION INDIV IN: CPT

## 2017-07-22 PROCEDURE — 84132 ASSAY OF SERUM POTASSIUM: CPT | Mod: 91

## 2017-07-22 RX ORDER — METOPROLOL TARTRATE 25 MG/1
12.5 TABLET ORAL 3 TIMES DAILY
Status: DISCONTINUED | OUTPATIENT
Start: 2017-07-22 | End: 2017-07-24

## 2017-07-22 RX ORDER — POTASSIUM CHLORIDE 20 MEQ/1
20 TABLET, EXTENDED RELEASE ORAL 2 TIMES DAILY
Status: DISCONTINUED | OUTPATIENT
Start: 2017-07-22 | End: 2017-07-22

## 2017-07-22 RX ORDER — FUROSEMIDE 10 MG/ML
20 INJECTION INTRAMUSCULAR; INTRAVENOUS ONCE
Status: COMPLETED | OUTPATIENT
Start: 2017-07-22 | End: 2017-07-22

## 2017-07-22 RX ORDER — POTASSIUM CHLORIDE 20 MEQ/1
20 TABLET, EXTENDED RELEASE ORAL DAILY
Status: DISCONTINUED | OUTPATIENT
Start: 2017-07-22 | End: 2017-07-22

## 2017-07-22 RX ORDER — HYDROMORPHONE HYDROCHLORIDE 1 MG/ML
0.5 INJECTION, SOLUTION INTRAMUSCULAR; INTRAVENOUS; SUBCUTANEOUS ONCE
Status: COMPLETED | OUTPATIENT
Start: 2017-07-22 | End: 2017-07-22

## 2017-07-22 RX ORDER — FUROSEMIDE 10 MG/ML
20 INJECTION INTRAMUSCULAR; INTRAVENOUS 2 TIMES DAILY
Status: DISCONTINUED | OUTPATIENT
Start: 2017-07-22 | End: 2017-07-23

## 2017-07-22 RX ORDER — ONDANSETRON 2 MG/ML
4 INJECTION INTRAMUSCULAR; INTRAVENOUS EVERY 6 HOURS
Status: DISCONTINUED | OUTPATIENT
Start: 2017-07-22 | End: 2017-07-27

## 2017-07-22 RX ORDER — NICOTINE 7MG/24HR
1 PATCH, TRANSDERMAL 24 HOURS TRANSDERMAL DAILY
Status: DISCONTINUED | OUTPATIENT
Start: 2017-07-22 | End: 2017-08-08 | Stop reason: HOSPADM

## 2017-07-22 RX ORDER — ACETAMINOPHEN 10 MG/ML
1000 INJECTION, SOLUTION INTRAVENOUS EVERY 8 HOURS
Status: COMPLETED | OUTPATIENT
Start: 2017-07-22 | End: 2017-07-23

## 2017-07-22 RX ORDER — ACETAMINOPHEN 10 MG/ML
1000 INJECTION, SOLUTION INTRAVENOUS EVERY 8 HOURS
Status: CANCELLED | OUTPATIENT
Start: 2017-07-22 | End: 2017-07-23

## 2017-07-22 RX ORDER — METOCLOPRAMIDE 10 MG/1
10 TABLET ORAL
Status: DISCONTINUED | OUTPATIENT
Start: 2017-07-22 | End: 2017-07-27

## 2017-07-22 RX ORDER — KETOROLAC TROMETHAMINE 15 MG/ML
15 INJECTION, SOLUTION INTRAMUSCULAR; INTRAVENOUS EVERY 6 HOURS PRN
Status: DISCONTINUED | OUTPATIENT
Start: 2017-07-22 | End: 2017-07-25

## 2017-07-22 RX ADMIN — NICARDIPINE HYDROCHLORIDE 10 MG/HR: 0.2 INJECTION, SOLUTION INTRAVENOUS at 09:07

## 2017-07-22 RX ADMIN — MUPIROCIN 1 G: 20 OINTMENT TOPICAL at 08:07

## 2017-07-22 RX ADMIN — CEFTRIAXONE 2 G: 2 INJECTION, SOLUTION INTRAVENOUS at 11:07

## 2017-07-22 RX ADMIN — DEXTROSE MONOHYDRATE 25 G: 25 INJECTION, SOLUTION INTRAVENOUS at 05:07

## 2017-07-22 RX ADMIN — FAMOTIDINE 20 MG: 10 INJECTION, SOLUTION INTRAVENOUS at 08:07

## 2017-07-22 RX ADMIN — FAMOTIDINE 20 MG: 10 INJECTION, SOLUTION INTRAVENOUS at 09:07

## 2017-07-22 RX ADMIN — ONDANSETRON 4 MG: 2 INJECTION INTRAMUSCULAR; INTRAVENOUS at 12:07

## 2017-07-22 RX ADMIN — HYDROMORPHONE HYDROCHLORIDE 0.5 MG: 1 INJECTION, SOLUTION INTRAMUSCULAR; INTRAVENOUS; SUBCUTANEOUS at 12:07

## 2017-07-22 RX ADMIN — METOCLOPRAMIDE 10 MG: 10 TABLET ORAL at 06:07

## 2017-07-22 RX ADMIN — ACETAMINOPHEN 1000 MG: 10 INJECTION, SOLUTION INTRAVENOUS at 09:07

## 2017-07-22 RX ADMIN — Medication: at 10:07

## 2017-07-22 RX ADMIN — IPRATROPIUM BROMIDE AND ALBUTEROL SULFATE 3 ML: .5; 3 SOLUTION RESPIRATORY (INHALATION) at 08:07

## 2017-07-22 RX ADMIN — IPRATROPIUM BROMIDE AND ALBUTEROL SULFATE 3 ML: .5; 3 SOLUTION RESPIRATORY (INHALATION) at 03:07

## 2017-07-22 RX ADMIN — METOCLOPRAMIDE 10 MG: 10 TABLET ORAL at 12:07

## 2017-07-22 RX ADMIN — FUROSEMIDE 20 MG: 10 INJECTION, SOLUTION INTRAVENOUS at 05:07

## 2017-07-22 RX ADMIN — CALCIUM GLUCONATE 1 G: 94 INJECTION, SOLUTION INTRAVENOUS at 05:07

## 2017-07-22 RX ADMIN — NICARDIPINE HYDROCHLORIDE 5 MG/HR: 0.2 INJECTION, SOLUTION INTRAVENOUS at 03:07

## 2017-07-22 RX ADMIN — IPRATROPIUM BROMIDE AND ALBUTEROL SULFATE 3 ML: .5; 3 SOLUTION RESPIRATORY (INHALATION) at 07:07

## 2017-07-22 RX ADMIN — Medication 3 ML: at 06:07

## 2017-07-22 RX ADMIN — Medication 3 ML: at 04:07

## 2017-07-22 RX ADMIN — NICOTINE 1 PATCH: 7 PATCH, EXTENDED RELEASE TRANSDERMAL at 11:07

## 2017-07-22 RX ADMIN — ACETAMINOPHEN 1000 MG: 10 INJECTION, SOLUTION INTRAVENOUS at 01:07

## 2017-07-22 RX ADMIN — ONDANSETRON 4 MG: 2 INJECTION INTRAMUSCULAR; INTRAVENOUS at 06:07

## 2017-07-22 RX ADMIN — FUROSEMIDE 20 MG: 10 INJECTION, SOLUTION INTRAMUSCULAR; INTRAVENOUS at 06:07

## 2017-07-22 RX ADMIN — MUPIROCIN 1 G: 20 OINTMENT TOPICAL at 09:07

## 2017-07-22 RX ADMIN — ASPIRIN 325 MG ORAL TABLET 325 MG: 325 PILL ORAL at 09:07

## 2017-07-22 RX ADMIN — Medication 12.5 MG: at 12:07

## 2017-07-22 RX ADMIN — Medication: at 12:07

## 2017-07-22 RX ADMIN — Medication 3 ML: at 09:07

## 2017-07-22 RX ADMIN — IPRATROPIUM BROMIDE AND ALBUTEROL SULFATE 3 ML: .5; 3 SOLUTION RESPIRATORY (INHALATION) at 11:07

## 2017-07-22 RX ADMIN — ONDANSETRON 4 MG: 2 INJECTION INTRAMUSCULAR; INTRAVENOUS at 10:07

## 2017-07-22 RX ADMIN — Medication: at 08:07

## 2017-07-22 RX ADMIN — Medication 12.5 MG: at 09:07

## 2017-07-22 RX ADMIN — INSULIN HUMAN 10 UNITS: 100 INJECTION, SOLUTION PARENTERAL at 05:07

## 2017-07-22 RX ADMIN — IPRATROPIUM BROMIDE AND ALBUTEROL SULFATE 3 ML: .5; 3 SOLUTION RESPIRATORY (INHALATION) at 12:07

## 2017-07-22 RX ADMIN — NICARDIPINE HYDROCHLORIDE 7.5 MG/HR: 0.2 INJECTION, SOLUTION INTRAVENOUS at 04:07

## 2017-07-22 RX ADMIN — NICARDIPINE HYDROCHLORIDE 7.5 MG/HR: 0.2 INJECTION, SOLUTION INTRAVENOUS at 09:07

## 2017-07-22 NOTE — ASSESSMENT & PLAN NOTE
26yoM with infective endocarditis 2/2 strep angionosis with hemoptysis.  Reports it was at its worst on 7/19 and has improved since.  He denies any leon blood clots.  Notable improvement on CXR especially s/p valve replacement.  Hemoptysis appears to have resolved.    - His hemoptysis is improving - his CXR has notably improved; he is still having fevers today so it may be worth getting a CT Chest to evaluate for any focus of infection  - Continue diuresis  - Continue abx for endocarditis

## 2017-07-22 NOTE — SUBJECTIVE & OBJECTIVE
"Interval HPI:   Overnight events: Extubated yesterday PM  Eating: Not yet, possible diet today; swallow study?    /79   Pulse 101   Temp (!) 101 °F (38.3 °C)   Resp (!) 22   Ht 5' 8" (1.727 m)   Wt 70.1 kg (154 lb 8.7 oz)   SpO2 96%   BMI 23.50 kg/m²     Labs Reviewed and Include      Recent Labs  Lab 07/22/17  0300 07/22/17  0600     --    CALCIUM 7.9*  --    *  --    K 6.5* 4.9   CO2 21*  --      --    BUN 20  --    CREATININE 1.0  --      Lab Results   Component Value Date    WBC 12.68 07/22/2017    HGB 9.3 (L) 07/22/2017    HCT 27.9 (L) 07/22/2017    MCV 82 07/22/2017     07/22/2017     No results for input(s): TSH, FREET4 in the last 168 hours.  Lab Results   Component Value Date    HGBA1C 4.9 07/22/2017       Nutritional status:   Body mass index is 23.5 kg/m².  Lab Results   Component Value Date    ALBUMIN 2.9 (L) 07/20/2017    ALBUMIN 2.9 (L) 07/20/2017    ALBUMIN 4.7 03/23/2015     No results found for: PREALBUMIN    Estimated Creatinine Clearance: 109.3 mL/min (based on Cr of 1).    Accu-Checks  Recent Labs      07/21/17   2214  07/21/17   2319  07/22/17   0017  07/22/17   0107  07/22/17   0215  07/22/17   0308  07/22/17   0413  07/22/17   0503  07/22/17   0538  07/22/17   0604   POCTGLUCOSE  112*  130*  105  115*  127*  117*  126*  126*  212*  190*       Current Medications and/or Treatments Impacting Glycemic Control  Immunotherapy:  Immunosuppressants     None        Steroids:   Hormones     Start     Stop Route Frequency Ordered    07/21/17 1745  vasopressin (PITRESSIN) 100 Units in dextrose 5 % 100 mL infusion      -- IV Continuous 07/21/17 1640        Pressors:    Autonomic Drugs     Start     Stop Route Frequency Ordered    07/21/17 1715  epinephrine 4 mg in sodium chloride 0.9% 250 mL infusion     Question Answer Comment   Titrate by: (in mcg/kg/min) 0.05    Titrate interval: (in minutes) 5    Titrate to maintain: (SBP or MAP or Cardiac Index) CARDIAC INDEX  "   Cardiac index greater than: (in L/min) 2.2    Maximum dose: (in mcg/kg/min) 2        -- IV Continuous 07/21/17 1609 07/21/17 1715  norepinephrine 4 mg in dextrose 5% 250 mL infusion (premix) (titrating)     Question Answer Comment   Titrate by: (in mcg/kg/min) 0.05    Titrate interval: (in minutes) 5    Titrate to maintain: (MAP or SBP) MAP    Greater than: (in mmHg) 60    Maximum dose: (in mcg/kg/min) 3        -- IV Continuous 07/21/17 1609        Hyperglycemia/Diabetes Medications: Antihyperglycemics     Start     Stop Route Frequency Ordered    07/21/17 1715  insulin regular (Humulin R) 100 Units in sodium chloride 0.9% 100 mL infusion      -- IV Continuous 07/21/17 1608

## 2017-07-22 NOTE — PT/OT/SLP EVAL
"Occupational Therapy  Evaluation    Kwan Palacio   MRN: [id number]   Admitting Diagnosis: S/P AVR (aortic valve replacement)    OT Date of Treatment: 07/22/17   OT Start Time: 1032  OT Stop Time: 1050  OT Total Time (min): 18 min    Billable Minutes:  Evaluation 10  Therapeutic Activity 8    Diagnosis: S/P AVR (aortic valve replacement)   7/21/17    Past Medical History:   Diagnosis Date    Dislocation of shoulder, left, closed       History reviewed. No pertinent surgical history.    Referring physician: Bill  Date referred to OT: 7/21/17    General Precautions: Standard, fall, sternal  Orthopedic Precautions:    Braces:      Do you have any cultural, spiritual, Bahai conflicts, given your current situation?: none reported     Patient History:  Living Environment  Lives With: significant other  Living Arrangements: apartment  Transportation Available: family or friend will provide  Living Environment Comment: Pt will have assistance upon d/c per mother, but unsure whether pt will live in first or second floor apartement    Prior level of function:   Bed Mobility/Transfers: independent  Grooming: independent  Bathing: independent  Upper Body Dressing: independent  Lower Body Dressing: independent  Toileting: independent  Driving License: Yes  Type of Occupation: construction, 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  "I"m a little hard-headed."  Chief Complaint: pain  Patient/Family stated goals: to get better    Pain/Comfort  Pain Rating 1: 10/10  Location - Side 1: Bilateral  Location - Orientation 1: midline  Location 1: sternal  Pain Addressed 1: Other (see comments), Pre-medicate for activity, Distraction (PCA)  Pain Rating Post-Intervention 1: 10/10    Objective:  Patient found with: arterial line, blood pressure cuff, central line, chest tube, rodríguez catheter, PCA, peripheral IV, telemetry, pulse ox (continuous)    Cognitive " Exam:  A&O x 3 following all commands    Physical Exam:  Postural examination/scapula alignment: forward flexed posture in standing 2* guarding chest  Skin integrity: Visible skin intact  Edema:     Sensation:   Intact    Upper Extremity Range of Motion:  Right Upper Extremity: WFL  Left Upper Extremity: WFL    Upper Extremity Strength:  Right Upper Extremity: WFL  Left Upper Extremity: WFL   Strength: Good  Functional Mobility:  Bed Mobility:  Rolling/Turning to Left: Maximum assistance  Scooting/Bridging: Maximum Assistance  Supine to Sit: Maximum Assistance    Transfers:  Sit <> Stand Assistance: Minimum Assistance  Sit <> Stand Assistive Device: No Assistive Device  Bed <> Chair Technique: Squat Pivot  Bed <> Chair Transfer Assistance: Minimum Assistance  Bed <> Chair Assistive Device: No Assistive Device    Functional Ambulation: Minimal A x 6 steps to chair with cueing for upright posture    Activities of Daily Living:  Feeding Level of Assistance: Set-up Assistance  Feeding adaptive equipment:   UE Dressing Level of Assistance: Total assistance (2* pain)  UE adaptive equipment:   LE Dressing Level of Assistance: Total assistance  LE adaptive equipment:   Grooming Position: Seated  Grooming Level of Assistance: Maximum assistance     Therapeutic Activities and Exercises:  Pt ed on role of OT and sternal precautions during ADL    AM-PAC 6 CLICK ADL  How much help from another person does this patient currently need?  1 = Unable, Total/Dependent Assistance  2 = A lot, Maximum/Moderate Assistance  3 = A little, Minimum/Contact Guard/Supervision  4 = None, Modified [place]/Independent    Putting on and taking off regular lower body clothing? : 1  Bathing (including washing, rinsing, drying)?: 1  Toileting, which includes using toilet, bedpan, or urinal? : 1  Putting on and taking off regular upper body clothing?: 1  Taking care of personal grooming such as brushing teeth?: 2  Eating meals?: 3  Total  "Score: 9    AM-PAC Raw Score CMS "G-Code Modifier Level of Impairment Assistance   6 % Total / Unable   7 - 9 CM 80 - 100% Maximal Assist   10-14 CL 60 - 80% Moderate Assist   15 - 19 CK 40 - 60% Moderate Assist   20 - 22 CJ 20 - 40% Minimal Assist   23 CI 1-20% SBA / CGA   24 CH 0% Independent/ Mod I       Patient left up in chair with all lines intact, call button in reach, RN notified and family present    Assessment:  Kwan Palacio is a 26 y.o. male with a medical diagnosis of S/P AVR (aortic valve replacement) and presents with pain and decreased endurance affecting ADL (I).    Rehab identified problem list/impairments: Rehab identified problem list/impairments: weakness, impaired endurance, impaired self care skills, gait instability, impaired functional mobilty, impaired balance, decreased upper extremity function, pain, impaired cardiopulmonary response to activity    Rehab potential is good.    Activity tolerance: Good    Discharge recommendations: Discharge Facility/Level Of Care Needs: home     Barriers to discharge: Barriers to Discharge: None    Equipment recommendations: none     GOALS:    Occupational Therapy Goals        Problem: Occupational Therapy Goal    Goal Priority Disciplines Outcome Interventions   Occupational Therapy Goal     OT, PT/OT Ongoing (interventions implemented as appropriate)    Description:  Goals to be met by: 8/1/17     Patient will increase functional independence with ADLs by performing:    Feeding with [place].  UE Dressing with Modified [place].  LE Dressing with Modified [place].  Grooming while standing at sink with Modified [place].  Toileting from toilet with Modified [place] for hygiene and clothing management.   Toilet transfer to toilet with Modified [place].                      PLAN:  Patient to be seen 4 x/week to address the above listed problems via self-care/home management, therapeutic activities, therapeutic " exercises  Plan of Care expires: 08/21/17  Plan of Care reviewed with: patient, significant other, father, mother         CHANNING Burns  07/22/2017

## 2017-07-22 NOTE — ASSESSMENT & PLAN NOTE
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 Of note, also had some hemoptysis and pleuritic chest pain with coughing prior to surgery. He is POD # 1 from AVR & MVR with prosthesis by CT surgery. While he has had fever spikes postoperatively, it is not uncommon after surgery. Of note, no new leukocytosis and no new symptoms to suggest alternate cause of infection.     - continue rocephin 2g q24h for 6 weeks after negative BCx (7/20)  - given high likely of this bug to form abscesses, recommend CT Chest & CT Abd/Pelvis to rule out alternate sites of abscess formation, septic emboli (may be cause of hemoptysis)  - will need to f/u in ID clinic on discharge.

## 2017-07-22 NOTE — PLAN OF CARE
Problem: Patient Care Overview  Goal: Plan of Care Review  Outcome: Ongoing (interventions implemented as appropriate)  Up in chair today transfer with minimal assist.  Pca dilaudid in use and controlling pain.  C/o throat soreness and spitting up small amts clear fluid after small sips.  REspiratory status improving.  Chest tubes remain to suction.

## 2017-07-22 NOTE — PROGRESS NOTES
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 Of note, also had some hemoptysis and pleuritic chest pain with coughing prior to surgery. He is POD # 1 from AVR & MVR with prosthesis by CT surgery. While he has had fever spikes postoperatively, it is not uncommon after surgery. Of note, no new leukocytosis and no new symptoms to suggest alternate cause of infection.     - continue rocephin 2g q24h for 6 weeks after negative BCx (7/20). Please change freq to q24h instead of q12h  - given high likely of this bug to form abscesses, recommend CT Chest & CT Abd/Pelvis to rule out alternate sites of abscess formation, septic emboli (may be cause of hemoptysis)  - will need to f/u in ID clinic on discharge.            Thank you for your consult.  Please contact us if you have any additional questions.    Grecia Valdes MD  Infectious Disease  [facility]    Attending attestation to follow    Subjective:     Principal Problem:S/P AVR (aortic valve replacement)    HPI: Pt is a 25 yo male with a recently diagnosed bicuspid aortic valve who presents to [facility] as a transfer from [facility] for streptococcus angionosis endocarditis. Symptoms began 2 months ago with fevers and malaise. Went to the ED multiple times before being admitted 2 weeks ago. Blood cultures from 07/04/2017 positive for streptococcus angionosis; TTE & DOUG demonstrated vegetations & newly diagnosed bicuspid aortic valve. Patient was treated with gentamicin & rocephin. Yesterday, while in hospital, the patient developed a productive cough with hemoptysis and transferred  to our hospital for evaluation by CT surgery.    Today, pt complains of continued hemoptysis. He has generalized pain worst in his throat & chest, particularly his ribs; rib pain rated 8/10 and painful with movement & inspiration. He felt fevers/chills last night, no documented fever. He denies headaches, blurry vision, n/v, or rash. He has had a 30 lb wt loss over the last two months. Positive risk factors for endocarditis include a history of drug abuse (denies Iv) & valvular disease (bicuspid aortic valve, recently diagnosed at [facility].)  CXR demonstrate parenchymal opacities consistent with pulmonary edema, BL PNA or pulmonary hemorrhage. 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 No leukocytosis, blood cultures NGTD.  Currently on rocephin.     Interval History: NAEON. VS wnl.   Pt received AVR & MVR with prosthesis yesterday. Since then he has been spiking fevers (T max 101.1 at 2300 on 7/21). He had some left leg pain postoperatively which was attributed to muscle cramps. US of LE was negative for DVTs    Review of Systems   Constitutional: Negative for activity change, fever and unexpected weight change.   HENT: Negative for hearing loss and tinnitus.    Respiratory: Negative for choking, shortness of breath and wheezing.    Cardiovascular: Positive for chest pain. Negative for palpitations and leg swelling.        Pain with coughing   Gastrointestinal: Negative for abdominal pain and blood in stool.   Endocrine: Negative.    Genitourinary: Negative.    Neurological: Negative.      Objective:     Vital Signs (Most Recent):  Temp: 99.6 °F (37.6 °C) (07/22/17 1200)  Pulse: 91 (07/22/17 1200)  Resp: (!) 22 (07/22/17 1200)  BP: 112/77 (07/22/17 1000)  SpO2: 99 % (07/22/17 1200) Vital Signs (24h Range):  Temp:  [97.4 °F (36.3 °C)-101.1 °F (38.4 °C)] 99.6 °F (37.6 °C)  Pulse:   [] 91  Resp:  [9-45] 22  SpO2:  [69 %-100 %] 99 %  BP: ()/(51-84) 112/77  Arterial Line BP: ()/(44-77) 96/68     Weight: 70.1 kg (154 lb 8.7 oz)  Body mass index is 23.5 kg/m².    Estimated Creatinine Clearance: 109.3 mL/min (based on Cr of 1).    Physical Exam   Constitutional: He is oriented to person, place, and time. He appears well-developed and well-nourished.   HENT:   Head: Normocephalic and atraumatic.   Eyes: Pupils are equal, round, and reactive to light.   Cardiovascular: Normal rate and regular rhythm.    No murmur heard. MR and AR murmurs have resolved   Sternotomy scan is bandaged. looks clean, dry, w/o surrounding erythema or drainage.  Pulmonary/Chest: Effort normal and breath sounds normal. He has no wheezes. He has no rales.   Abdominal: Soft. Bowel sounds are normal. He exhibits no distension. There is no tenderness.   Musculoskeletal: He exhibits no edema.   Neurological: He is alert and oriented to person, place, and time.   Skin: Skin is warm and dry.     Significant Labs:   CBC:   Recent Labs  Lab 07/21/17  1609 07/21/17  1609 07/21/17  2315 07/22/17  0300   WBC 24.23*  --  11.35 12.68   HGB 8.0*  --  9.1* 9.3*   HCT 24.6* 22* 27.7* 27.9*     --  224 214     CMP:   Recent Labs  Lab 07/21/17  1609 07/21/17  1940 07/22/17  0300 07/22/17  0600 07/22/17  0856 07/22/17  1226   * 134* 130*  --   --   --    K 3.6  3.6 4.1 6.5* 4.9 5.5* 6.0*    102 101  --   --   --    CO2 18* 19* 21*  --   --   --    * 156* 108  --   --   --    BUN 21* 21* 20  --   --   --    CREATININE 1.3 1.2 1.0  --   --   --    CALCIUM 7.7* 8.0* 7.9*  --   --   --    ANIONGAP 15 13 8  --   --   --    EGFRNONAA >60.0 >60.0 >60.0  --   --   --      Significant Imaging: I have reviewed all pertinent imaging results/findings within the past 24 hours.

## 2017-07-22 NOTE — PLAN OF CARE
Problem: Occupational Therapy Goal  Goal: Occupational Therapy Goal  Goals to be met by: 8/1/17     Patient will increase functional independence with ADLs by performing:    Feeding with Murray.  UE Dressing with Modified Murray.  LE Dressing with Modified [place].  Grooming while standing at sink with Modified Murray.  Toileting from toilet with Modified Murray for hygiene and clothing management.   Toilet transfer to toilet with Modified Murray.    Outcome: Ongoing (interventions implemented as appropriate)  OT eval completed, and above goals established. CHANNING Burns    7/22/2017

## 2017-07-22 NOTE — PROGRESS NOTES
Progress Note  Cardiothoracic Surgery    Admit Date: 7/19/2017  Post-operative Day: 1 Day Post-Op  Hospital Day: 4    SUBJECTIVE:     Follow-up For: Procedure(s) (LRB):  REPLACEMENT-VALVE-AORTIC (N/A)  REPAIR VALVE-MITRAL (N/A)  REPAIR AORTIC ROOT (N/A)  PLACEMENT-NEOPERICARDIUM (N/A)     Interval History:  Taken to the OR yesterday for replacement of the aortic valve, repair of mitral valve and aortic root, and placement of a neopericardium. Tolerated well. Transferred to the ICU afterwards and extubated. This AM doing well except for complaints of pain. Hyperkalemic, but corrected. Currently requiring a small amount of epi and cardene.     Scheduled Meds:   acetaminophen  1,000 mg Intravenous Q8H    albuterol-ipratropium 2.5mg-0.5mg/3mL  3 mL Nebulization Q4H    aspirin  325 mg Oral Once    aspirin  325 mg Oral Daily    cefTRIAXone (ROCEPHIN) IVPB  2 g Intravenous Q12H    famotidine (PF)  20 mg Intravenous BID    furosemide  20 mg Intravenous BID    metoclopramide HCl  10 mg Oral TID AC    metoprolol tartrate  12.5 mg Oral TID    mupirocin  1 g Nasal BID    nicotine  1 patch Transdermal Daily    ondansetron  4 mg Intravenous Q6H    polyethylene glycol  17 g Oral Daily    sodium chloride 0.9%  3 mL Intravenous Q8H     Infusions/Drips:   epinephrine Stopped (07/22/17 0900)    hydromorphone in 0.9 % NaCl 6 mg/30 ml      insulin (HUMAN R) infusion (adults) 0.6 Units/hr (07/22/17 1400)    nicardipine 5 mg/hr (07/22/17 1200)    norepinephrine bitartrate-D5W Stopped (07/21/17 1900)    propofol      vasopressin (PITRESSIN) infusion Stopped (07/21/17 2200)     PRN Meds: albumin human 5%, [COMPLETED] calcium gluconate IVPB **AND** calcium gluconate IVPB, dextrose 50%, dextrose 50%, [COMPLETED] dextrose 50% **AND** dextrose 50% **AND** [COMPLETED] insulin regular, ketorolac, lactated Ringers, lactulose, magnesium sulfate IVPB, metoclopramide HCl, naloxone, ondansetron, potassium chloride **AND**  potassium chloride **AND** potassium chloride, sodium phosphate IVPB, sodium phosphate IVPB, sodium phosphate IVPB    Review of patient's allergies indicates:  No Known Allergies    OBJECTIVE:     Vital Signs (Most Recent)  Temp: 99.6 °F (37.6 °C) (07/22/17 1200)  Pulse: 85 (07/22/17 1400)  Resp: 17 (07/22/17 1400)  BP: 112/77 (07/22/17 1000)  SpO2: 99 % (07/22/17 1400)    Admission Weight: 68.3 kg (150 lb 9.2 oz) (07/20/17 0059)   Most Recent Weight: 70.1 kg (154 lb 8.7 oz) (07/22/17 0700)    Vital Signs Range (Last 24H):  Temp:  [97.4 °F (36.3 °C)-101.1 °F (38.4 °C)]   Pulse:  []   Resp:  [9-45]   BP: ()/(51-84)   SpO2:  [69 %-100 %]   Arterial Line BP: ()/(44-77)     I & O (Last 24H):  Intake/Output Summary (Last 24 hours) at 07/22/17 1419  Last data filed at 07/22/17 1400   Gross per 24 hour   Intake             6277 ml   Output             1983 ml   Net             4294 ml     Physical Exam:  NC/AT, EOMI  RRR, surgical incision C/D/I, Chest tube in place 118cc output  LCTAB  Abd soft, non-tender, non-distended    Laboratory:  CBC:   Recent Labs  Lab 07/22/17  0300   WBC 12.68   RBC 3.40*   HGB 9.3*   HCT 27.9*      MCV 82   MCH 27.4   MCHC 33.3     BMP:   Recent Labs  Lab 07/22/17  0300  07/22/17  1226     --   --    *  --   --    K 6.5*  < > 6.0*     --   --    CO2 21*  --   --    BUN 20  --   --    CREATININE 1.0  --   --    CALCIUM 7.9*  --   --    MG 1.8  --   --    < > = values in this interval not displayed.  CMP:   Recent Labs  Lab 07/20/17  0100  07/22/17  0300  07/22/17  1226     104  104  < > 108  --   --    CALCIUM 8.6*  8.6*  8.6*  < > 7.9*  --   --    ALBUMIN 2.9*  2.9*  --   --   --   --    PROT 6.5  6.5  --   --   --   --      140  140  < > 130*  --   --    K 3.9  3.9  3.9  < > 6.5*  < > 6.0*   CO2 29  29  29  < > 21*  --   --      100  100  < > 101  --   --    BUN 16  16  16  < > 20  --   --    CREATININE 1.1  1.1   1.1  < > 1.0  --   --    ALKPHOS 84  84  --   --   --   --    ALT 35  35  --   --   --   --    AST 32  32  --   --   --   --    BILITOT 0.5  0.5  --   --   --   --    < > = values in this interval not displayed.  LFTs:   Recent Labs  Lab 07/20/17  0100   ALT 35  35   AST 32  32   ALKPHOS 84  84   BILITOT 0.5  0.5   PROT 6.5  6.5   ALBUMIN 2.9*  2.9*     Coagulation:   Recent Labs  Lab 07/22/17  0300   LABPROT 11.0   INR 1.0   APTT 25.1     Cardiac markers: No results for input(s): CKMB, CPKMB, TROPONINT, TROPONINI, MYOGLOBIN in the last 168 hours.  ABGs:   Recent Labs  Lab 07/21/17  2315   PH 7.343*   PCO2 44.4   PO2 24*   HCO3 24.1   POCSATURATED 39*   BE -2       Diagnostic Results:  X-Ray: Reviewed    ASSESSMENT/PLAN:     27 y/o M s/p replacement of aortic valve, repair of mitral valve and aortic root, placement of a neopericardium 1 Day Post-Op    Neuro:  - Continue current pain control regimen    Resp:  - Extubated  - Minimize supplemental O2 requirements  - Encourage IS, deep breathing, cough  - Chest tubes to remain in place to wall suction    CV:  - wean epi and cardene as tolerated  -   - Start metoprolol 12.5TID  - Sternal precautions    Heme:  - Hgb/Hct 9.3/27.9  - plts 214  - Continue to trend    ID:  - Routine post-operative Abx - Ancef x 5 doses    Renal:  - Nguyen in place  - Strict I/Os  - Lasix 20 BID    FEN/GI:  - zofran for nausea  - ADAT to cardiac diet (low sodium, 1500 mL restriction)  - Replace lytes PRN  - reglan    Endo:  - Endocrine following, appreciate assistance  - Accuchecks  - Insulin gtt    Dispo:  - Continue ICU care      Goran Moreira MD  7/22/2017 2:29 PM

## 2017-07-22 NOTE — CONSULTS
"  [facility]  Adult Nutrition  Consult Note    SUMMARY     Recommendations    Recommendation/Intervention:   1. Once medically able, initiate clear liquids and advance as tolerated to Cardiac diet.   2. If PO intake <50%, recommend Boost Plus TID. Will monitor.   Goals: Pt to receive nutrition by next RD visit.   Nutrition Goal Status: new  Communication of RD Recs: reviewed with RN    Reason for Assessment    Reason for Assessment: physician consult  Diagnosis: cardiac disease (s/p MVR)  Relevent Medical History: infective endocarditis     Nutrition Discharge Planning: Pt may need education prior to d/c. Will monitor.     Nutrition Prescription Ordered    Current Diet Order: NPO    Evaluation of Received Nutrients/Fluid Intake    % Intake of Estimated Energy Needs: 0 - 25 %  % Meal Intake: NPO     Nutrition Risk Screen     Nutrition Risk Screen: no indicators present    Nutrition/Diet History    Typical Food/Fluid Intake: Pt remains NPO at this time. Extubated overnight.   Factors Affecting Nutritional Intake: NPO    Labs/Tests/Procedures/Meds    Pertinent Labs Reviewed: reviewed  Pertinent Labs Comments: Na 130, Ca 7.9  Pertinent Medications Reviewed: reviewed  Pertinent Medications Comments: epi, lasix, insulin, lactulose    Physical Findings    Overall Physical Appearance: nourished  Tubes: other (see comments) (chest tube - 118mL output)  Oral/Mouth Cavity: WDL  Skin: incision (in chest)    Anthropometrics    Temp: (!) 101 °F (38.3 °C)  Height: 5' 8" (172.7 cm)  Weight Method: Bed Scale  Weight: 70.1 kg (154 lb 8.7 oz)  Ideal Body Weight (IBW), Male: 154 lb  % Ideal Body Weight, Male (lb): 100.35   BMI (Calculated): 23.5  BMI Grade: 18.5-24.9 - normal    Estimated/Assessed Needs    Weight Used For Calorie Calculations: 70.1 kg (154 lb 8.7 oz)   Energy Calorie Requirements (kcal): 2075  Energy Need Method: Harper-St Manuelor (1.25 PAL)  RMR (Harper-St. Jeor Equation): 1660.5  Weight Used For " Protein Calculations: 70.1 kg (154 lb 8.7 oz)  Protein Requirements: 70-84g (1-1.2g/kg)  Fluid Requirements (mL): per MD    Assessment and Plan    Nutrition Problem  Increased protein needs    Related to (etiology):   Physiological needs    Signs and Symptoms (as evidenced by):   S/p MVR, chest incision     Interventions/Recommendations (treatment strategy):  See recs above    Nutrition Diagnosis Status:   New    Monitor and Evaluation    Food and Nutrient Intake: energy intake  Food and Nutrient Adminstration: diet order  Anthropometric Measurements: weight, weight change  Biochemical Data, Medical Tests and Procedures: other (specify) (All labs)  Nutrition-Focused Physical Findings: overall appearance    Nutrition Risk    Level of Risk: other (see comments) (2X/week)    Nutrition Follow-Up    RD Follow-up?: Yes

## 2017-07-22 NOTE — ASSESSMENT & PLAN NOTE
BG goal 110-140   Continue CTS insulin infusion. Change to q2 accuchecks.  Once eating, will change to transition gtt.  Order A1c.     Discharge Planning: ongoing, anticipating resolution.

## 2017-07-22 NOTE — PROGRESS NOTES
Progress Note  Surgical Intensive Care    Admit Date: 7/19/2017  Post-operative Day: 1 Day Post-Op  Hospital Day: 4    SUBJECTIVE:     Follow-up For:  Procedure(s) (LRB):  REPLACEMENT-VALVE-AORTIC (N/A)  REPAIR VALVE-MITRAL (N/A)  REPAIR AORTIC ROOT (N/A)  PLACEMENT-NEOPERICARDIUM (N/A)    HPI: Patient is a 26 y.o. male  transferred from [facility] for surgical evaluation of infective endocarditis. He has been on micafungin, Vanc, Gentamicin, and Rocephin abx therapy. He had a DOUG at the [facility] but is not in his chart. He was taken to the OR today by CTS. He had replacement of the Aortic valve, repair of the mitral valve and aortic root, and placement of neopericardium.     He presents to the SICU sedated and intubated on 70% and PEEP of 5, requiring 0.06 of levo, 0.08 of epi, and 0.04 of vaso.     Interval history: Patient was extubated shortly after arrival to the SICU. He was evaluated for LE pain for which U/S was negative for DVT. His potassium this AM was 6.5 and was not noted to be hemolyzed. Correction is being started while getting a repeat BMP. He has significant pain, has been on suboxone prior to admission to the hospital.     Continuous Infusions:   epinephrine 0.02 mcg/kg/min (07/22/17 0500)    hydromorphone in 0.9 % NaCl 6 mg/30 ml      insulin (HUMAN R) infusion (adults) 1.1 Units/hr (07/22/17 0500)    nicardipine 5 mg/hr (07/22/17 0500)    norepinephrine bitartrate-D5W Stopped (07/21/17 1900)    propofol      vasopressin (PITRESSIN) infusion Stopped (07/21/17 2200)     Scheduled Meds:   albuterol-ipratropium 2.5mg-0.5mg/3mL  3 mL Nebulization Q4H    aspirin  325 mg Oral Once    aspirin  325 mg Oral Daily    cefTRIAXone (ROCEPHIN) IVPB  2 g Intravenous Q12H    famotidine (PF)  20 mg Intravenous BID    mupirocin  1 g Nasal BID    nicotine  1 patch Transdermal Daily    polyethylene glycol  17 g Oral Daily    sodium chloride 0.9%  3 mL Intravenous Q8H     PRN Meds:albumin human 5%, [COMPLETED]  calcium gluconate IVPB **AND** calcium gluconate IVPB, dextrose 50%, dextrose 50%, [COMPLETED] dextrose 50% **AND** dextrose 50% **AND** [COMPLETED] insulin regular, lactated Ringers, lactulose, magnesium sulfate IVPB, metoclopramide HCl, naloxone, ondansetron, potassium chloride **AND** potassium chloride **AND** potassium chloride, sodium phosphate IVPB, sodium phosphate IVPB, sodium phosphate IVPB    Review of patient's allergies indicates:  No Known Allergies    OBJECTIVE:     Vital Signs (Most Recent)  Temp: (!) 100.4 °F (38 °C) (07/22/17 0200)  Pulse: 93 (07/22/17 0524)  Resp: (!) 22 (07/22/17 0524)  BP: 116/64 (07/22/17 0500)  SpO2: 97 % (07/22/17 0524)    Vital Signs Range (Last 24H):  Temp:  [97.4 °F (36.3 °C)-101.1 °F (38.4 °C)]   Pulse:  []   Resp:  [9-29]   BP: ()/(51-84)   SpO2:  [93 %-100 %]   Arterial Line BP: ()/(44-73)     I & O (Last 24H):  Intake/Output Summary (Last 24 hours) at 07/22/17 0558  Last data filed at 07/22/17 0500   Gross per 24 hour   Intake             6386 ml   Output             1388 ml   Net             4998 ml     Ventilator Data (Last 24H):     Vent Mode: Spont  Oxygen Concentration (%):  [40-70] 40  Resp Rate Total:  [15 br/min-29 br/min] 29 br/min  Vt Set:  [500 mL] 500 mL  PEEP/CPAP:  [0 cmH20-5 cmH20] 0 cmH20  Pressure Support:  [0 cmH20-10 cmH20] 0 cmH20  Mean Airway Pressure:  [3.4 cmH20-9.3 cmH20] 3.4 cmH20    Hemodynamic Parameters (Last 24H):  PAP: (26-40)/(11-20) 35/16  PAP (Mean):  [19 mmHg-26 mmHg] 23 mmHg  CO:  [4.5 L/min-7.6 L/min] 5.7 L/min  CI:  [2.3 L/min/m2-4.2 L/min/m2] 3.2 L/min/m2    Physical Exam   Constitutional: He appears well-developed and well-nourished.   HENT:   Head: Normocephalic and atraumatic.   Neck: No tracheal deviation present.  Cardiovascular: Normal rate.    Pulmonary/Chest: Breath sounds normal. No respiratory distress.   Abdominal: Soft. He exhibits no distension and no mass. There is no tenderness.   Skin: Skin is warm  and dry. No rash noted.     Laboratory (Last 24H):  CBC:    Recent Labs  Lab 07/22/17  0300   WBC 12.68   HGB 9.3*   HCT 27.9*        CMP:    Recent Labs  Lab 07/22/17  0300   CALCIUM 7.9*   *   K 6.5*   CO2 21*      BUN 20   CREATININE 1.0       Chest X-Ray: I personally reviewed the films and findings are:stable    ASSESSMENT/PLAN:       Plan:    Neuro:   -IV fentanyl PRN  -IV acetaminophen  -Sedation weaned off     Pulmonary:   -extubated yesterday  Meds chest tube - monitor output    Cardiac:  -MAP range goal >65  -0.02 of epi  -wean pressors     Renal:   -Nguyen in place  -Bun/Cr  Down to 1.0, baseline around 1.0-1.1, avoid nephrotoxic agents  -UOP adequate  -Lactate 1.6 yesterday     Fluids/Electrolytes/Nutrition/GI:   -Nutritional status: advance to clears today     Hematology/Oncology:  -H/H, 9.3/27.9, continue to monitor closely  -INR/Plts 1.0/214     Infectious Disease:   -101 at 0800  -Infectious endocarditis  -WBC 12.47 -> 24.23 -> 12.68 this AM  -rocephin 2g q12  -Cultures NGTD     Endocrine:  -Glucose range, goal of 120-150     Dispo:  -Continue care in the ICU setting    - d/c [facility] today      Raryay Christina PGY-1  [phone]  07/22/2017

## 2017-07-22 NOTE — PROGRESS NOTES
Called for new leg pain after extubation.  He is awake and able to carry on conversation.  Minimal supplemental oxygen.  CVP 7, MAP 70 on epi 0.04 vaso 0.04; weaning to off.  He complains of pain for the lateral lower leg.  No skin change. Capillary refill less than 2 sec.  Pain with passive and active knee and ankle flexion.  Triphasic signal present at PT and AT.  Will order DVT leg and monitor for change in exam.    Javier Khan D.O.   Fellow in Cardiothoracic Surgery  PG [facility]  Pg [phone]  7/21/2017 8:18 PM

## 2017-07-22 NOTE — PROGRESS NOTES
"[facility]  Endocrinology  Progress Note    Admit Date: 7/19/2017     Reason for Consult: Management of Hyperglycemia     Surgical Procedure and Date:  7/21/17  REPLACEMENT-VALVE-AORTIC   REPAIR VALVE-MITRAL   REPAIR AORTIC ROOT   PLACEMENT-NEOPERICARDIUM      HPI:   Patient is a 26 y.o. male  with pmh of IV drug use, who presents from OSH with a diagnosis of endocarditis. Pt. Previously healthy individual who began experiencing symptoms ~2 months ago, including chest pain, hemoptysis and worsening ROBERTS. Now S/p AVR, MVR, and aortic root repair.   No previous history of diabetes or prediabetes listed in EPIC.   Endocrine consulted for optimal BG management.            Interval HPI:   Overnight events: Extubated yesterday PM  Eating: Not yet, possible diet today; swallow study?    /79   Pulse 101   Temp (!) 101 °F (38.3 °C)   Resp (!) 22   Ht 5' 8" (1.727 m)   Wt 70.1 kg (154 lb 8.7 oz)   SpO2 96%   BMI 23.50 kg/m²       Labs Reviewed and Include      Recent Labs  Lab 07/22/17  0300 07/22/17  0600     --    CALCIUM 7.9*  --    *  --    K 6.5* 4.9   CO2 21*  --      --    BUN 20  --    CREATININE 1.0  --      Lab Results   Component Value Date    WBC 12.68 07/22/2017    HGB 9.3 (L) 07/22/2017    HCT 27.9 (L) 07/22/2017    MCV 82 07/22/2017     07/22/2017     No results for input(s): TSH, FREET4 in the last 168 hours.  Lab Results   Component Value Date    HGBA1C 4.9 07/22/2017       Nutritional status:   Body mass index is 23.5 kg/m².  Lab Results   Component Value Date    ALBUMIN 2.9 (L) 07/20/2017    ALBUMIN 2.9 (L) 07/20/2017    ALBUMIN 4.7 03/23/2015     No results found for: PREALBUMIN    Estimated Creatinine Clearance: 109.3 mL/min (based on Cr of 1).    Accu-Checks  Recent Labs      07/21/17   2214  07/21/17   2319  07/22/17   0017  07/22/17   0107  07/22/17   0215  07/22/17   0308  07/22/17   0413  07/22/17   0503  07/22/17   0538  07/22/17   0604 "   POCTGLUCOSE  112*  130*  105  115*  127*  117*  126*  126*  212*  190*       Current Medications and/or Treatments Impacting Glycemic Control  Immunotherapy:  Immunosuppressants     None        Steroids:   Hormones     Start     Stop Route Frequency Ordered    07/21/17 1745  vasopressin (PITRESSIN) 100 Units in dextrose 5 % 100 mL infusion      -- IV Continuous 07/21/17 1640        Pressors:    Autonomic Drugs     Start     Stop Route Frequency Ordered    07/21/17 1715  epinephrine 4 mg in sodium chloride 0.9% 250 mL infusion     Question Answer Comment   Titrate by: (in mcg/kg/min) 0.05    Titrate interval: (in minutes) 5    Titrate to maintain: (SBP or MAP or Cardiac Index) CARDIAC INDEX    Cardiac index greater than: (in L/min) 2.2    Maximum dose: (in mcg/kg/min) 2        -- IV Continuous 07/21/17 1609    07/21/17 1715  norepinephrine 4 mg in dextrose 5% 250 mL infusion (premix) (titrating)     Question Answer Comment   Titrate by: (in mcg/kg/min) 0.05    Titrate interval: (in minutes) 5    Titrate to maintain: (MAP or SBP) MAP    Greater than: (in mmHg) 60    Maximum dose: (in mcg/kg/min) 3        -- IV Continuous 07/21/17 1609        Hyperglycemia/Diabetes Medications: Antihyperglycemics     Start     Stop Route Frequency Ordered    07/21/17 1715  insulin regular (Humulin R) 100 Units in sodium chloride 0.9% 100 mL infusion      -- IV Continuous 07/21/17 1609          ASSESSMENT and PLAN    Stress hyperglycemia    BG goal 110-140   Continue CTS insulin infusion. Change to q2 accuchecks.  Once eating, will change to transition gtt.  Order A1c.     Discharge Planning: ongoing, anticipating resolution.           S/P MVR (mitral valve repair)    Per CTS. Avoid hypoglycemia.           Endocarditis    Now s/p MVR and AVR          * S/P AVR (aortic valve replacement)    Per CTS. Avoid hypoglycemia             Will discuss with staff, Dr. Haynes.    Marquise Valles MD  Endocrinology  [facility] MD,  have personally taken the history and examined the patient and agree with the resident's note as stated above.  Continue intensive monitoring     Expect he will self wean off drip soon

## 2017-07-22 NOTE — PROGRESS NOTES
Pt was extubated per md request, and is resting comfortably on a 3l nasal cannula. Will continue to monitor closely.

## 2017-07-22 NOTE — SUBJECTIVE & OBJECTIVE
Interval History: NAEON. VS wnl.   Pt received AVR & MVR with prosthesis yesterday. Since then he has been spiking fevers (T max 101.1 at 2300 on 7/21). He had some left leg pain postoperatively which was attributed to muscle cramps. US of LE was negative for DVTs    Review of Systems   Constitutional: Negative for activity change, fever and unexpected weight change.   HENT: Negative for hearing loss and tinnitus.    Respiratory: Negative for choking, shortness of breath and wheezing.    Cardiovascular: Positive for chest pain. Negative for palpitations and leg swelling.        Pain with coughing   Gastrointestinal: Negative for abdominal pain and blood in stool.   Endocrine: Negative.    Genitourinary: Negative.    Neurological: Negative.      Objective:     Vital Signs (Most Recent):  Temp: 99.6 °F (37.6 °C) (07/22/17 1200)  Pulse: 91 (07/22/17 1200)  Resp: (!) 22 (07/22/17 1200)  BP: 112/77 (07/22/17 1000)  SpO2: 99 % (07/22/17 1200) Vital Signs (24h Range):  Temp:  [97.4 °F (36.3 °C)-101.1 °F (38.4 °C)] 99.6 °F (37.6 °C)  Pulse:  [] 91  Resp:  [9-45] 22  SpO2:  [69 %-100 %] 99 %  BP: ()/(51-84) 112/77  Arterial Line BP: ()/(44-77) 96/68     Weight: 70.1 kg (154 lb 8.7 oz)  Body mass index is 23.5 kg/m².    Estimated Creatinine Clearance: 109.3 mL/min (based on Cr of 1).    Physical Exam   Constitutional: He is oriented to person, place, and time. He appears well-developed and well-nourished.   HENT:   Head: Normocephalic and atraumatic.   Eyes: Pupils are equal, round, and reactive to light.   Cardiovascular: Normal rate and regular rhythm.    No murmur heard.  MR and AR murmurs have resolved   Pulmonary/Chest: Effort normal and breath sounds normal. He has no wheezes. He has no rales.   Abdominal: Soft. Bowel sounds are normal. He exhibits no distension. There is no tenderness.   Musculoskeletal: He exhibits no edema.   Neurological: He is alert and oriented to person, place, and time.   Skin:  Skin is warm and dry.     Significant Labs:   CBC:   Recent Labs  Lab 07/21/17  1609 07/21/17  1609 07/21/17  2315 07/22/17  0300   WBC 24.23*  --  11.35 12.68   HGB 8.0*  --  9.1* 9.3*   HCT 24.6* 22* 27.7* 27.9*     --  224 214     CMP:   Recent Labs  Lab 07/21/17  1609 07/21/17  1940 07/22/17  0300 07/22/17  0600 07/22/17  0856 07/22/17  1226   * 134* 130*  --   --   --    K 3.6  3.6 4.1 6.5* 4.9 5.5* 6.0*    102 101  --   --   --    CO2 18* 19* 21*  --   --   --    * 156* 108  --   --   --    BUN 21* 21* 20  --   --   --    CREATININE 1.3 1.2 1.0  --   --   --    CALCIUM 7.7* 8.0* 7.9*  --   --   --    ANIONGAP 15 13 8  --   --   --    EGFRNONAA >60.0 >60.0 >60.0  --   --   --      Significant Imaging: I have reviewed all pertinent imaging results/findings within the past 24 hours.

## 2017-07-23 LAB
ANION GAP SERPL CALC-SCNC: 10 MMOL/L
ANION GAP SERPL CALC-SCNC: 13 MMOL/L
ANISOCYTOSIS BLD QL SMEAR: SLIGHT
BASOPHILS # BLD AUTO: 0.02 K/UL
BASOPHILS NFR BLD: 0.1 %
BUN SERPL-MCNC: 29 MG/DL
BUN SERPL-MCNC: 30 MG/DL
CALCIUM SERPL-MCNC: 8 MG/DL
CALCIUM SERPL-MCNC: 8.2 MG/DL
CHLORIDE SERPL-SCNC: 90 MMOL/L
CHLORIDE SERPL-SCNC: 94 MMOL/L
CO2 SERPL-SCNC: 20 MMOL/L
CO2 SERPL-SCNC: 24 MMOL/L
CREAT SERPL-MCNC: 1.3 MG/DL
CREAT SERPL-MCNC: 1.4 MG/DL
DIFFERENTIAL METHOD: ABNORMAL
EOSINOPHIL # BLD AUTO: 0 K/UL
EOSINOPHIL NFR BLD: 0.1 %
ERYTHROCYTE [DISTWIDTH] IN BLOOD BY AUTOMATED COUNT: 15.8 %
EST. GFR  (AFRICAN AMERICAN): >60 ML/MIN/1.73 M^2
EST. GFR  (AFRICAN AMERICAN): >60 ML/MIN/1.73 M^2
EST. GFR  (NON AFRICAN AMERICAN): >60 ML/MIN/1.73 M^2
EST. GFR  (NON AFRICAN AMERICAN): >60 ML/MIN/1.73 M^2
GLUCOSE SERPL-MCNC: 107 MG/DL
GLUCOSE SERPL-MCNC: 114 MG/DL
HCT VFR BLD AUTO: 25.3 %
HGB BLD-MCNC: 8.4 G/DL
HYPOCHROMIA BLD QL SMEAR: ABNORMAL
LYMPHOCYTES # BLD AUTO: 2 K/UL
LYMPHOCYTES NFR BLD: 12.3 %
MAGNESIUM SERPL-MCNC: 1.6 MG/DL
MAGNESIUM SERPL-MCNC: 2.2 MG/DL
MCH RBC QN AUTO: 27 PG
MCHC RBC AUTO-ENTMCNC: 33.2 G/DL
MCV RBC AUTO: 81 FL
MONOCYTES # BLD AUTO: 1.9 K/UL
MONOCYTES NFR BLD: 11.7 %
NEUTROPHILS # BLD AUTO: 12.1 K/UL
NEUTROPHILS NFR BLD: 75.8 %
PLATELET # BLD AUTO: 265 K/UL
PLATELET BLD QL SMEAR: ABNORMAL
PMV BLD AUTO: 9.7 FL
POCT GLUCOSE: 108 MG/DL (ref 70–110)
POCT GLUCOSE: 121 MG/DL (ref 70–110)
POCT GLUCOSE: 121 MG/DL (ref 70–110)
POCT GLUCOSE: 123 MG/DL (ref 70–110)
POCT GLUCOSE: 124 MG/DL (ref 70–110)
POCT GLUCOSE: 125 MG/DL (ref 70–110)
POCT GLUCOSE: 128 MG/DL (ref 70–110)
POCT GLUCOSE: 131 MG/DL (ref 70–110)
POCT GLUCOSE: 137 MG/DL (ref 70–110)
POCT GLUCOSE: 139 MG/DL (ref 70–110)
POCT GLUCOSE: 141 MG/DL (ref 70–110)
POCT GLUCOSE: 144 MG/DL (ref 70–110)
POCT GLUCOSE: 145 MG/DL (ref 70–110)
POCT GLUCOSE: 150 MG/DL (ref 70–110)
POCT GLUCOSE: 160 MG/DL (ref 70–110)
POLYCHROMASIA BLD QL SMEAR: ABNORMAL
POTASSIUM SERPL-SCNC: 3.8 MMOL/L
POTASSIUM SERPL-SCNC: 3.8 MMOL/L
POTASSIUM SERPL-SCNC: 4 MMOL/L
POTASSIUM SERPL-SCNC: 4.5 MMOL/L
POTASSIUM SERPL-SCNC: 5 MMOL/L
POTASSIUM SERPL-SCNC: 5.6 MMOL/L
POTASSIUM SERPL-SCNC: 5.7 MMOL/L
POTASSIUM SERPL-SCNC: 5.7 MMOL/L
RBC # BLD AUTO: 3.11 M/UL
SODIUM SERPL-SCNC: 124 MMOL/L
SODIUM SERPL-SCNC: 127 MMOL/L
WBC # BLD AUTO: 16.03 K/UL

## 2017-07-23 PROCEDURE — 25000003 PHARM REV CODE 250: Performed by: STUDENT IN AN ORGANIZED HEALTH CARE EDUCATION/TRAINING PROGRAM

## 2017-07-23 PROCEDURE — 99233 SBSQ HOSP IP/OBS HIGH 50: CPT | Mod: ,,, | Performed by: SURGERY

## 2017-07-23 PROCEDURE — 94799 UNLISTED PULMONARY SVC/PX: CPT

## 2017-07-23 PROCEDURE — 99900035 HC TECH TIME PER 15 MIN (STAT)

## 2017-07-23 PROCEDURE — 80048 BASIC METABOLIC PNL TOTAL CA: CPT

## 2017-07-23 PROCEDURE — 83735 ASSAY OF MAGNESIUM: CPT | Mod: 91

## 2017-07-23 PROCEDURE — 25000003 PHARM REV CODE 250: Performed by: THORACIC SURGERY (CARDIOTHORACIC VASCULAR SURGERY)

## 2017-07-23 PROCEDURE — 99232 SBSQ HOSP IP/OBS MODERATE 35: CPT | Mod: ,,, | Performed by: INTERNAL MEDICINE

## 2017-07-23 PROCEDURE — 25000242 PHARM REV CODE 250 ALT 637 W/ HCPCS: Performed by: THORACIC SURGERY (CARDIOTHORACIC VASCULAR SURGERY)

## 2017-07-23 PROCEDURE — 63600175 PHARM REV CODE 636 W HCPCS: Performed by: THORACIC SURGERY (CARDIOTHORACIC VASCULAR SURGERY)

## 2017-07-23 PROCEDURE — 83735 ASSAY OF MAGNESIUM: CPT

## 2017-07-23 PROCEDURE — 63600175 PHARM REV CODE 636 W HCPCS: Performed by: STUDENT IN AN ORGANIZED HEALTH CARE EDUCATION/TRAINING PROGRAM

## 2017-07-23 PROCEDURE — 84132 ASSAY OF SERUM POTASSIUM: CPT

## 2017-07-23 PROCEDURE — 27100171 HC OXYGEN HIGH FLOW UP TO 24 HOURS

## 2017-07-23 PROCEDURE — 94640 AIRWAY INHALATION TREATMENT: CPT

## 2017-07-23 PROCEDURE — 20000000 HC ICU ROOM

## 2017-07-23 PROCEDURE — 84132 ASSAY OF SERUM POTASSIUM: CPT | Mod: 91

## 2017-07-23 PROCEDURE — 36415 COLL VENOUS BLD VENIPUNCTURE: CPT

## 2017-07-23 PROCEDURE — 27000221 HC OXYGEN, UP TO 24 HOURS

## 2017-07-23 PROCEDURE — 63600175 PHARM REV CODE 636 W HCPCS: Performed by: INTERNAL MEDICINE

## 2017-07-23 PROCEDURE — 80048 BASIC METABOLIC PNL TOTAL CA: CPT | Mod: 91

## 2017-07-23 PROCEDURE — 85025 COMPLETE CBC W/AUTO DIFF WBC: CPT

## 2017-07-23 RX ORDER — IBUPROFEN 200 MG
24 TABLET ORAL
Status: DISCONTINUED | OUTPATIENT
Start: 2017-07-23 | End: 2017-07-23

## 2017-07-23 RX ORDER — INSULIN ASPART 100 [IU]/ML
0-4 INJECTION, SOLUTION INTRAVENOUS; SUBCUTANEOUS
Status: DISCONTINUED | OUTPATIENT
Start: 2017-07-23 | End: 2017-07-23

## 2017-07-23 RX ORDER — GLUCAGON 1 MG
1 KIT INJECTION
Status: DISCONTINUED | OUTPATIENT
Start: 2017-07-23 | End: 2017-07-23

## 2017-07-23 RX ORDER — ACETAMINOPHEN 10 MG/ML
1000 INJECTION, SOLUTION INTRAVENOUS EVERY 8 HOURS
Status: COMPLETED | OUTPATIENT
Start: 2017-07-23 | End: 2017-07-24

## 2017-07-23 RX ORDER — HYDRALAZINE HYDROCHLORIDE 25 MG/1
25 TABLET, FILM COATED ORAL EVERY 6 HOURS PRN
Status: DISCONTINUED | OUTPATIENT
Start: 2017-07-23 | End: 2017-07-27

## 2017-07-23 RX ORDER — INSULIN ASPART 100 [IU]/ML
2-4 INJECTION, SOLUTION INTRAVENOUS; SUBCUTANEOUS
Status: DISCONTINUED | OUTPATIENT
Start: 2017-07-23 | End: 2017-07-23

## 2017-07-23 RX ORDER — IBUPROFEN 200 MG
16 TABLET ORAL
Status: DISCONTINUED | OUTPATIENT
Start: 2017-07-23 | End: 2017-07-23

## 2017-07-23 RX ORDER — FUROSEMIDE 10 MG/ML
40 INJECTION INTRAMUSCULAR; INTRAVENOUS 2 TIMES DAILY
Status: DISCONTINUED | OUTPATIENT
Start: 2017-07-23 | End: 2017-07-26

## 2017-07-23 RX ORDER — HYDRALAZINE HYDROCHLORIDE 25 MG/1
25 TABLET, FILM COATED ORAL EVERY 8 HOURS
Status: DISCONTINUED | OUTPATIENT
Start: 2017-07-23 | End: 2017-07-25

## 2017-07-23 RX ORDER — HYDRALAZINE HYDROCHLORIDE 20 MG/ML
10 INJECTION INTRAMUSCULAR; INTRAVENOUS ONCE
Status: COMPLETED | OUTPATIENT
Start: 2017-07-23 | End: 2017-07-23

## 2017-07-23 RX ORDER — IPRATROPIUM BROMIDE AND ALBUTEROL SULFATE 2.5; .5 MG/3ML; MG/3ML
3 SOLUTION RESPIRATORY (INHALATION) EVERY 4 HOURS PRN
Status: DISCONTINUED | OUTPATIENT
Start: 2017-07-23 | End: 2017-07-27

## 2017-07-23 RX ORDER — METOLAZONE 5 MG/1
5 TABLET ORAL DAILY
Status: DISCONTINUED | OUTPATIENT
Start: 2017-07-23 | End: 2017-07-23

## 2017-07-23 RX ADMIN — HYDRALAZINE HYDROCHLORIDE 25 MG: 25 TABLET, FILM COATED ORAL at 08:07

## 2017-07-23 RX ADMIN — INSULIN ASPART 4 UNITS: 100 INJECTION, SOLUTION INTRAVENOUS; SUBCUTANEOUS at 09:07

## 2017-07-23 RX ADMIN — MAGNESIUM SULFATE IN WATER 2 G: 40 INJECTION, SOLUTION INTRAVENOUS at 05:07

## 2017-07-23 RX ADMIN — ACETAMINOPHEN 1000 MG: 10 INJECTION, SOLUTION INTRAVENOUS at 09:07

## 2017-07-23 RX ADMIN — ONDANSETRON 4 MG: 2 INJECTION INTRAMUSCULAR; INTRAVENOUS at 05:07

## 2017-07-23 RX ADMIN — ACETAMINOPHEN 1000 MG: 10 INJECTION, SOLUTION INTRAVENOUS at 05:07

## 2017-07-23 RX ADMIN — MUPIROCIN 1 G: 20 OINTMENT TOPICAL at 09:07

## 2017-07-23 RX ADMIN — Medication 3 ML: at 09:07

## 2017-07-23 RX ADMIN — FUROSEMIDE 40 MG: 10 INJECTION, SOLUTION INTRAVENOUS at 08:07

## 2017-07-23 RX ADMIN — IPRATROPIUM BROMIDE AND ALBUTEROL SULFATE 3 ML: .5; 3 SOLUTION RESPIRATORY (INHALATION) at 03:07

## 2017-07-23 RX ADMIN — METOCLOPRAMIDE 10 MG: 10 TABLET ORAL at 05:07

## 2017-07-23 RX ADMIN — FAMOTIDINE 20 MG: 10 INJECTION, SOLUTION INTRAVENOUS at 09:07

## 2017-07-23 RX ADMIN — Medication 3 ML: at 05:07

## 2017-07-23 RX ADMIN — Medication: at 01:07

## 2017-07-23 RX ADMIN — IPRATROPIUM BROMIDE AND ALBUTEROL SULFATE 3 ML: .5; 3 SOLUTION RESPIRATORY (INHALATION) at 08:07

## 2017-07-23 RX ADMIN — HYDRALAZINE HYDROCHLORIDE 25 MG: 25 TABLET, FILM COATED ORAL at 10:07

## 2017-07-23 RX ADMIN — POTASSIUM CHLORIDE 20 MEQ: 200 INJECTION, SOLUTION INTRAVENOUS at 04:07

## 2017-07-23 RX ADMIN — Medication: at 07:07

## 2017-07-23 RX ADMIN — Medication 3 ML: at 02:07

## 2017-07-23 RX ADMIN — MUPIROCIN 1 G: 20 OINTMENT TOPICAL at 08:07

## 2017-07-23 RX ADMIN — Medication 12.5 MG: at 08:07

## 2017-07-23 RX ADMIN — HYDRALAZINE HYDROCHLORIDE 25 MG: 25 TABLET, FILM COATED ORAL at 09:07

## 2017-07-23 RX ADMIN — IPRATROPIUM BROMIDE AND ALBUTEROL SULFATE 3 ML: .5; 3 SOLUTION RESPIRATORY (INHALATION) at 11:07

## 2017-07-23 RX ADMIN — HYDRALAZINE HYDROCHLORIDE 25 MG: 25 TABLET, FILM COATED ORAL at 01:07

## 2017-07-23 RX ADMIN — FAMOTIDINE 20 MG: 10 INJECTION, SOLUTION INTRAVENOUS at 08:07

## 2017-07-23 RX ADMIN — POLYETHYLENE GLYCOL 3350 17 G: 17 POWDER, FOR SOLUTION ORAL at 09:07

## 2017-07-23 RX ADMIN — ASPIRIN 325 MG ORAL TABLET 325 MG: 325 PILL ORAL at 08:07

## 2017-07-23 RX ADMIN — METOLAZONE 5 MG: 5 TABLET ORAL at 08:07

## 2017-07-23 RX ADMIN — ONDANSETRON 4 MG: 2 INJECTION INTRAMUSCULAR; INTRAVENOUS at 12:07

## 2017-07-23 RX ADMIN — NICARDIPINE HYDROCHLORIDE 7.5 MG/HR: 0.2 INJECTION, SOLUTION INTRAVENOUS at 04:07

## 2017-07-23 RX ADMIN — HYDRALAZINE HYDROCHLORIDE 10 MG: 20 INJECTION INTRAMUSCULAR; INTRAVENOUS at 11:07

## 2017-07-23 RX ADMIN — Medication 12.5 MG: at 05:07

## 2017-07-23 RX ADMIN — Medication 12.5 MG: at 01:07

## 2017-07-23 RX ADMIN — FUROSEMIDE 20 MG/HR: 10 INJECTION, SOLUTION INTRAMUSCULAR; INTRAVENOUS at 12:07

## 2017-07-23 RX ADMIN — CEFTRIAXONE 2 G: 2 INJECTION, SOLUTION INTRAVENOUS at 09:07

## 2017-07-23 RX ADMIN — HYDRALAZINE HYDROCHLORIDE 25 MG: 25 TABLET, FILM COATED ORAL at 04:07

## 2017-07-23 RX ADMIN — Medication: at 08:07

## 2017-07-23 RX ADMIN — POTASSIUM CHLORIDE 20 MEQ: 200 INJECTION, SOLUTION INTRAVENOUS at 09:07

## 2017-07-23 NOTE — PROGRESS NOTES
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 Of note, also had some hemoptysis and pleuritic chest pain with coughing prior to surgery. He is POD # 1 from AVR & MVR with prosthesis by CT surgery. While he has had fever spikes postoperatively, it is not uncommon after surgery. Of note, no new leukocytosis and no new symptoms to suggest alternate cause of infection.     - continue rocephin 2g q24h for 6 weeks after negative BCx (7/20)  - given high likely of this bug to form abscesses, recommend CT Chest & CT Abd/Pelvis to rule out alternate sites of abscess formation when stable for imaging  - will need to f/u in ID clinic on discharge.                Thank you for your consult. I will follow-up with patient. Please contact us if you have any additional questions.    Sulaiman Sanchez MD  Infectious Disease  [facility]    Subjective:     Principal Problem:S/P AVR (aortic valve replacement)    HPI: Pt is a 27 yo male with a recently diagnosed bicuspid aortic valve who [facility] streptococcus angionosis endocarditis. Symptoms began 2 months ago with fevers and malaise. Went to the ED multiple times before being admitted 2 weeks ago. Blood cultures from 07/04/2017 positive for streptococcus angionosis; TTE & DOUG demonstrated vegetations & newly diagnosed bicuspid aortic valve. Patient was treated with gentamicin & rocephin. Yesterday, while in hospital, the patient developed a productive cough with hemoptysis and transferred to our hospital for evaluation by CT surgery.    Today, pt  complains of continued hemoptysis. He has generalized pain worst in his throat & chest, particularly his ribs; rib pain rated 8/10 and painful with movement & inspiration. He felt fevers/chills last night, no documented fever. He denies headaches, blurry vision, n/v, or rash. He has had a 30 lb wt loss over the last two months. Positive risk factors for endocarditis include a history of drug abuse (denies Iv) & valvular disease (bicuspid aortic valve, recently diagnosed at [facility]  CXR demonstrate parenchymal opacities consistent with pulmonary edema, BL PNA or pulmonary hemorrhage. 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 No leukocytosis, blood cultures NGTD.  Currently on rocephin.     Interval History: he feels less chest pain today     Review of Systems   Constitutional: Negative for activity change, fever and unexpected weight change.   HENT: Negative for hearing loss and tinnitus.    Respiratory: Negative for choking, shortness of breath and wheezing.    Cardiovascular: Positive for chest pain. Negative for palpitations and leg swelling.        Pain with coughing   Gastrointestinal: Negative for abdominal pain and blood in stool.   Endocrine: Negative.    Genitourinary: Negative.    Neurological: Negative.      Objective:     Vital Signs (Most Recent):  Temp: 98.8 °F (37.1 °C) (07/23/17 1100)  Pulse: 97 (07/23/17 1230)  Resp: 20 (07/23/17 1230)  BP: 115/67 (07/23/17 1200)  SpO2: 100 % (07/23/17 1230) Vital Signs (24h Range):  Temp:  [98.2 °F (36.8 °C)-98.9 °F (37.2 °C)] 98.8 °F (37.1 °C)  Pulse:  [74-97] 97  Resp:  [10-35] 20  SpO2:  [96 %-100 %] 100 %  BP: (115-123)/(58-67) 115/67  Arterial Line BP: ()/(55-83) 100/67     Weight: 71.3 kg (157 lb 3 oz)  Body mass index is 23.9 kg/m².    Estimated Creatinine Clearance: 78 mL/min (based on Cr of 1.4).    Physical Exam    Constitutional: He is oriented to person, place, and time. He appears well-developed and well-nourished.   HENT:   Head: Normocephalic and atraumatic.   Eyes: Pupils are equal, round, and reactive to light.   Cardiovascular: Normal rate and regular rhythm.    No murmur heard.  MR and AR murmurs have resolved   Pulmonary/Chest: Effort normal and breath sounds normal. He has no wheezes. He has no rales.   Abdominal: Soft. Bowel sounds are normal. He exhibits no distension. There is no tenderness.   Musculoskeletal: He exhibits no edema.   Neurological: He is alert and oriented to person, place, and time.   Skin: Skin is warm and dry.     Significant Labs:   CBC:     Recent Labs  Lab 07/21/17  2315 07/22/17  0300 07/23/17  0334   WBC 11.35 12.68 16.03*   HGB 9.1* 9.3* 8.4*   HCT 27.7* 27.9* 25.3*    214 265     CMP:   Recent Labs  Lab 07/21/17  1940 07/22/17  0300  07/23/17  0019 07/23/17  0334 07/23/17  0739   * 130*  --   --  124*  --    K 4.1 6.5*  < > 5.6* 5.7*  5.7* 5.0    101  --   --  94*  --    CO2 19* 21*  --   --  20*  --    * 108  --   --  107  --    BUN 21* 20  --   --  30*  --    CREATININE 1.2 1.0  --   --  1.4  --    CALCIUM 8.0* 7.9*  --   --  8.0*  --    ANIONGAP 13 8  --   --  10  --    EGFRNONAA >60.0 >60.0  --   --  >60.0  --    < > = values in this interval not displayed.  Significant Imaging: I have reviewed all pertinent imaging results/findings within the past 24 hours.

## 2017-07-23 NOTE — PROGRESS NOTES
"[facility]  Endocrinology  Progress Note    Admit Date: 7/19/2017     Reason for Consult: Management of Hyperglycemia     Surgical Procedure and Date:  7/21/17  REPLACEMENT-VALVE-AORTIC   REPAIR VALVE-MITRAL   REPAIR AORTIC ROOT   PLACEMENT-NEOPERICARDIUM      HPI:   Patient is a 26 y.o. male  with pmh of IV drug use, who presents from OSH with a diagnosis of endocarditis. Pt. Previously healthy individual who began experiencing symptoms ~2 months ago, including chest pain, hemoptysis and worsening ROBERTS. Now S/p AVR, MVR, and aortic root repair.   No previous history of diabetes or prediabetes listed in EPIC.   Endocrine consulted for optimal BG management.          Interval HPI:   Overnight events: Feeling better. Poor appetite. Was able to drink half of a smoothie this AM. Did not eat much yesterday.  Eating:   <25%   Nausea: No  Hypoglycemia and intervention: No  Fever: No  TPN and/or TF: No    /77   Pulse 87   Temp 98.6 °F (37 °C) (Oral)   Resp 10   Ht 5' 8" (1.727 m)   Wt 71.3 kg (157 lb 3 oz)   SpO2 98%   BMI 23.90 kg/m²       Labs Reviewed and Include      Recent Labs  Lab 07/23/17  0334      CALCIUM 8.0*   *   K 5.7*  5.7*   CO2 20*   CL 94*   BUN 30*   CREATININE 1.4     Lab Results   Component Value Date    WBC 16.03 (H) 07/23/2017    HGB 8.4 (L) 07/23/2017    HCT 25.3 (L) 07/23/2017    MCV 81 (L) 07/23/2017     07/23/2017     No results for input(s): TSH, FREET4 in the last 168 hours.  Lab Results   Component Value Date    HGBA1C 4.9 07/22/2017       Nutritional status:   Body mass index is 23.9 kg/m².  Lab Results   Component Value Date    ALBUMIN 2.9 (L) 07/20/2017    ALBUMIN 2.9 (L) 07/20/2017    ALBUMIN 4.7 03/23/2015     No results found for: PREALBUMIN    Estimated Creatinine Clearance: 78 mL/min (based on Cr of 1.4).    Accu-Checks  Recent Labs      07/22/17   1404  07/22/17   1527  07/22/17   1617  07/22/17   1821  07/22/17   1947  07/22/17   " 2104  07/22/17   2209  07/22/17   2250  07/23/17   0016  07/23/17   0110   POCTGLUCOSE  141*  121*  123*  108  145*  139*  125*  131*  144*  121*       Current Medications and/or Treatments Impacting Glycemic Control  Immunotherapy:  Immunosuppressants     None        Steroids:   Hormones     None        Pressors:    Autonomic Drugs     Start     Stop Route Frequency Ordered    07/22/17 1400  metoprolol tartrate (LOPRESSOR) split tablet 12.5 mg      -- Oral 3 times daily 07/22/17 1050        Hyperglycemia/Diabetes Medications: Antihyperglycemics     Start     Stop Route Frequency Ordered    07/23/17 0815  insulin regular (Humulin R) 100 Units in sodium chloride 0.9% 100 mL infusion      -- IV Continuous 07/23/17 0704    07/23/17 0815  insulin aspart pen 2-4 Units      -- SubQ 3 times daily with meals 07/23/17 0704    07/23/17 0803  insulin aspart pen 0-4 Units      -- SubQ As needed (PRN) 07/23/17 0704          ASSESSMENT and PLAN    Stress hyperglycemia    BG goal 110-140   Glucose at goal on intensive gtt. Drip rate 1-1.2 units/hr most recently.  Patient starting to eat today. Will change to transition gtt at 0.9 units/hr with wean parameters. Novolog 2-4 units depending on PO intake. POC glucose qAC, hs, 2am.  A1c 4.9.    Discharge Planning: ongoing, anticipating no insulin needs at discharge.          S/P MVR (mitral valve repair)    Per CTS. Avoid hypo/hyperglycemia.           Endocarditis    Now s/p MVR and AVR  Avoid hypo/hyperglycemia.        * S/P AVR (aortic valve replacement)    Per CTS. Avoid hypo/hyperglycemia             Will discuss with staff, Dr. Haynes.    Marquise Valles MD  Endocrinology  [facility], Briana Haynes MD,  have personally taken the history and examined the patient and agree with the resident's note as stated above.    Would do stepdown   Hold off on prandial for now

## 2017-07-23 NOTE — PROGRESS NOTES
Dr. Moreira notified MAP's in 80's. Dr. Goldberg and team rounded and updated on labs, current IV drips and pt condition. MD states he would like pt off Cardene IV drip; PRN PO Hydralazine has been given. MD ordered 10 mg IV Hydralazine once. Will carry out order and continue to monitor patient closely.

## 2017-07-23 NOTE — PROGRESS NOTES
Progress Note  Surgical Intensive Care    Admit Date: 7/19/2017  Post-operative Day: 2 Days Post-Op  Hospital Day: 5    SUBJECTIVE:     Follow-up For:  Procedure(s) (LRB):  REPLACEMENT-VALVE-AORTIC (N/A)  REPAIR VALVE-MITRAL (N/A)  REPAIR AORTIC ROOT (N/A)  PLACEMENT-NEOPERICARDIUM (N/A)    Interval history: NAEO, Afebrile. Continues to complain of pain, with Hydromorphone PCA. Hyperkalemic this morning, but has since improved with diuretics. Tolerating CLD.     Continuous Infusions:   hydromorphone in 0.9 % NaCl 6 mg/30 ml      nicardipine 5 mg/hr (07/23/17 1000)     Scheduled Meds:   albuterol-ipratropium 2.5mg-0.5mg/3mL  3 mL Nebulization Q4H    aspirin  325 mg Oral Once    aspirin  325 mg Oral Daily    cefTRIAXone (ROCEPHIN) IVPB  2 g Intravenous Q24H    famotidine (PF)  20 mg Intravenous BID    furosemide  40 mg Intravenous BID    hydrALAZINE  25 mg Oral Q8H    metoclopramide HCl  10 mg Oral TID AC    metOLazone  5 mg Oral Daily    metoprolol tartrate  12.5 mg Oral TID    mupirocin  1 g Nasal BID    nicotine  1 patch Transdermal Daily    ondansetron  4 mg Intravenous Q6H    polyethylene glycol  17 g Oral Daily    sodium chloride 0.9%  3 mL Intravenous Q8H     PRN Meds:albumin human 5%, [COMPLETED] calcium gluconate IVPB **AND** calcium gluconate IVPB, dextrose 50%, dextrose 50%, hydrALAZINE, ketorolac, lactulose, magnesium sulfate IVPB, metoclopramide HCl, naloxone, ondansetron, pneumoc 13-edith conj-dip cr(PF), potassium chloride **AND** potassium chloride **AND** potassium chloride, sodium phosphate IVPB, sodium phosphate IVPB, sodium phosphate IVPB    Review of patient's allergies indicates:  No Known Allergies    OBJECTIVE:     Vital Signs (Most Recent)  Temp: 98.5 °F (36.9 °C) (07/23/17 0700)  Pulse: 84 (07/23/17 1000)  Resp: (!) 24 (07/23/17 1000)  BP: 112/77 (07/22/17 1000)  SpO2: 99 % (07/23/17 1000)    Vital Signs Range (Last 24H):  Temp:  [98.2 °F (36.8 °C)-99.6 °F (37.6 °C)]   Pulse:   []   Resp:  [10-45]   SpO2:  [69 %-100 %]   Arterial Line BP: ()/(55-73)     I & O (Last 24H):  Intake/Output Summary (Last 24 hours) at 07/23/17 1034  Last data filed at 07/23/17 1000   Gross per 24 hour   Intake             1717 ml   Output             1340 ml   Net              377 ml     Ventilator Data (Last 24H):     Oxygen Concentration (%):  [40] 40  Resp Rate Total:  [24 br/min] 24 br/min    Hemodynamic Parameters (Last 24H):       Physical Exam:  NC/AT, EOMI  RRR, surgical incision C/D/I, Chest tube in place 140cc output  LCTAB  Abd soft, non-tender, non-distended    Wound/Incision:  clean, dry, intact    Lines/Drains:       Percutaneous Central Line Insertion/Assessment - double lumen  07/21/17 1600 right internal jugular (Active)   Dressing biopatch in place;dressing dry and intact 7/23/2017  7:00 AM   Securement secured w/ sutures 7/23/2017  7:00 AM   Additional Site Signs no erythema;no warmth;no edema 7/23/2017  7:00 AM   Distal Patency/Care infusing 7/23/2017  7:00 AM   Proximal Patency/Care infusing 7/23/2017  7:00 AM   Waveform normal 7/23/2017  7:00 AM   Line Interventions line leveled/zeroed 7/23/2017  7:00 AM   Dressing Change Due 07/28/17 7/23/2017  7:00 AM   Daily Line Review Performed 7/23/2017  7:00 AM   Number of days: 1            Peripheral IV - Single Lumen 07/19/17 0020 Left Forearm (Active)   Site Assessment Clean;Intact;Dry;No redness;No swelling 7/23/2017  7:00 AM   Line Status Flushed;Saline locked 7/23/2017  7:00 AM   Dressing Status Clean;Intact;Dry 7/23/2017  7:00 AM   Dressing Intervention Dressing reinforced 7/23/2017  7:00 AM   Dressing Change Due 07/23/17 7/23/2017  7:00 AM   Site Change Due 07/23/17 7/23/2017  7:00 AM   Reason Not Rotated Not due 7/23/2017  7:00 AM   Number of days: 4            Peripheral IV - Single Lumen 07/21/17 0956 Right Antecubital (Active)   Site Assessment Intact;Dry;Clean;No redness;No swelling 7/23/2017  7:00 AM   Line Status  "Flushed;Saline locked 7/23/2017  7:00 AM   Dressing Status Clean;Intact;Dry 7/23/2017  7:00 AM   Dressing Intervention Dressing reinforced 7/23/2017  7:00 AM   Dressing Change Due 07/25/17 7/23/2017  7:00 AM   Site Change Due 07/25/17 7/23/2017  7:00 AM   Reason Not Rotated Not due 7/23/2017  7:00 AM   Number of days: 2            Arterial Line 07/21/17 1011 Left Radial (Active)   Site Assessment Clean;Dry;No redness;Intact;No swelling 7/23/2017  7:00 AM   Line Status Pulsatile blood flow 7/23/2017  7:00 AM   Art Line Waveform Appropriate 7/23/2017  7:00 AM   Arterial Line Interventions Zeroed and calibrated;Leveled;Connections checked and tightened 7/23/2017  7:00 AM   Color/Movement/Sensation Capillary refill less than 3 sec 7/23/2017  7:00 AM   Dressing Type Transparent 7/23/2017  7:00 AM   Dressing Status Clean;Dry;Intact 7/23/2017  7:00 AM   Dressing Intervention Dressing reinforced 7/23/2017  7:00 AM   Dressing Change Due 07/28/17 7/23/2017  7:00 AM   Number of days: 2            Urethral Catheter 07/21/17 1015 Non-latex;Straight-tip;Temperature probe 16 Fr. (Active)   Site Assessment Clean;Intact 7/23/2017  7:00 AM   Collection Container Urimeter 7/23/2017  7:00 AM   Securement Method secured to top of thigh w/ adhesive device 7/23/2017  7:00 AM   Catheter Care Performed yes 7/23/2017  7:00 AM   Reason for Continuing Urinary Catheterization Critically ill in ICU requiring intensive monitoring 7/23/2017  7:00 AM   CAUTI Prevention Bundle StatLock in place w 1" slack;Drainage bag off the floor;No dependent loops or kinks;Drainage bag not overfilled (<2/3 full);Drainage bag below bladder;Green sheeting clip in use;Intact seal between catheter & drainage tubing 7/23/2017  7:00 AM   Output (mL) 50 mL 7/23/2017 10:00 AM   Number of days: 2            Y Chest Tube 1 and 2 07/21/17 1432 1 Mediastinal 19 Fr. 2 Mediastinal 19 Fr. (Active)   Function -20 cm H2O 7/23/2017  7:00 AM   Air Leak/Fluctuation air leak not " present 7/23/2017  7:00 AM   Safety all tubing connections taped;suction checked;all connections secured;2 rubber-tipped hemostats w/ patient 7/23/2017  7:00 AM   Securement tubing anchored to body distal to insertion site w/ tape 7/23/2017  7:00 AM   Left Subcutaneous Emphysema none present 7/23/2017  7:00 AM   Right Subcutaneous Emphysema none present 7/23/2017  7:00 AM   Patency Intervention Tip/tilt 7/23/2017  7:00 AM   Drainage Description 1 Serosanguineous 7/23/2017  7:00 AM   Tube 1 Dressing Appearance occlusive gauze dressing intact 7/23/2017  7:08 AM   Tube 1 Dressing Care dressing reinforced 7/23/2017  7:00 AM   Site Assessment 1 Not assessed;Other (Comment) 7/23/2017  7:08 AM   Surrounding Skin 1 Unable to view;Other (Comment) 7/23/2017  7:00 AM   Drainage Description 2 Serosanguineous 7/23/2017  7:00 AM   Tube 2 Dressing Appearance occlusive gauze dressing intact 7/23/2017  7:08 AM   Tube 2 Dressing Care dressing reinforced 7/23/2017  7:00 AM   Site Assessment 2 Not assessed;Other (Comment) 7/23/2017  7:08 AM   Surrounding Skin Unable to view;Other (Comment) 7/23/2017  7:00 AM   Output (mL) 0 mL 7/23/2017  7:00 AM   Number of days: 1       Laboratory (Last 24H):  CBC:    Recent Labs  Lab 07/23/17 0334   WBC 16.03*   HGB 8.4*   HCT 25.3*        CMP:    Recent Labs  Lab 07/23/17 0334 07/23/17  0739   CALCIUM 8.0*  --    *  --    K 5.7*  5.7* 5.0   CO2 20*  --    CL 94*  --    BUN 30*  --    CREATININE 1.4  --      BMP:   Recent Labs  Lab 07/23/17 0334 07/23/17 0337 07/23/17  0739     --   --    *  --   --    K 5.7*  5.7*  --  5.0   CL 94*  --   --    CO2 20*  --   --    BUN 30*  --   --    CREATININE 1.4  --   --    CALCIUM 8.0*  --   --    MG  --  1.6  --      Coagulation:   Recent Labs  Lab 07/22/17  0300   INR 1.0   APTT 25.1       Diagnostic Results:  X-Ray: Reviewed    ASSESSMENT/PLAN:     25 y/o M s/p replacement of aortic valve, repair of mitral valve and aortic root,  placement of a neopericardium 2 Day Post-Op     Neuro:  - Continue current pain control regimen, per primary team     Resp:  - Minimize supplemental O2 requirements  - Encourage IS, deep breathing, cough  - Chest tubes to remain in place to wall suction     CV:  - wean cardene as tolerated  -   - Continue metoprolol 12.5TID  - Sternal precautions     Heme:  - Hgb/Hct 8.4/24.0  - plts 338k  - Continue to trend     ID:  - Routine post-operative Abx - Ancef x 5 doses     Renal:  - Nguyen in place  - Strict I/Os  - Lasix 20 BID     FEN/GI:  - zofran for nausea  - ADAT to cardiac diet (low sodium, 1500 mL restriction)  - Replace lytes PRN  - reglan     Endo:  - Endocrine following, appreciate assistance  - Accuchecks  - Insulin gtt     Dispo:  - Continue ICU care, stepdown per primary team    Henry Moore MD  PGY-III  SICU

## 2017-07-23 NOTE — PROGRESS NOTES
Progress Note  Cardiothoracic Surgery    Admit Date: 7/19/2017  Post-operative Day: 2 Days Post-Op  Hospital Day: 5    SUBJECTIVE:     Follow-up For: Procedure(s) (LRB):  REPLACEMENT-VALVE-AORTIC (N/A)  REPAIR VALVE-MITRAL (N/A)  REPAIR AORTIC ROOT (N/A)  PLACEMENT-NEOPERICARDIUM (N/A)     Interval History:  NAEON. Afebrile. Main complaint of pain. Had some hyperkalemia which improved with duiretics. Tolerating clear liquid diet.     Scheduled Meds:   albuterol-ipratropium 2.5mg-0.5mg/3mL  3 mL Nebulization Q4H    aspirin  325 mg Oral Once    aspirin  325 mg Oral Daily    cefTRIAXone (ROCEPHIN) IVPB  2 g Intravenous Q24H    famotidine (PF)  20 mg Intravenous BID    furosemide  20 mg Intravenous BID    insulin aspart  2-4 Units Subcutaneous TIDWM    metoclopramide HCl  10 mg Oral TID AC    metoprolol tartrate  12.5 mg Oral TID    mupirocin  1 g Nasal BID    nicotine  1 patch Transdermal Daily    ondansetron  4 mg Intravenous Q6H    polyethylene glycol  17 g Oral Daily    sodium chloride 0.9%  3 mL Intravenous Q8H     Infusions/Drips:   hydromorphone in 0.9 % NaCl 6 mg/30 ml      insulin (HUMAN R) infusion (adults)      nicardipine Stopped (07/23/17 0600)     PRN Meds: albumin human 5%, [COMPLETED] calcium gluconate IVPB **AND** calcium gluconate IVPB, dextrose 50%, dextrose 50%, glucagon (human recombinant), glucose, glucose, insulin aspart, ketorolac, lactulose, magnesium sulfate IVPB, metoclopramide HCl, naloxone, ondansetron, pneumoc 13-edith conj-dip cr(PF), potassium chloride **AND** potassium chloride **AND** potassium chloride, sodium phosphate IVPB, sodium phosphate IVPB, sodium phosphate IVPB    Review of patient's allergies indicates:  No Known Allergies    OBJECTIVE:     Vital Signs (Most Recent)  Temp: 98.5 °F (36.9 °C) (07/23/17 0700)  Pulse: 78 (07/23/17 0700)  Resp: 18 (07/23/17 0700)  BP: 112/77 (07/22/17 1000)  SpO2: 98 % (07/23/17 0708)    Admission Weight: 68.3 kg (150 lb 9.2 oz)  (07/20/17 0059)   Most Recent Weight: 71.3 kg (157 lb 3 oz) (07/23/17 0500)    Vital Signs Range (Last 24H):  Temp:  [98.2 °F (36.8 °C)-101 °F (38.3 °C)]   Pulse:  []   Resp:  [10-45]   BP: (111-119)/(73-79)   SpO2:  [69 %-100 %]   Arterial Line BP: ()/(55-77)     I & O (Last 24H):    Intake/Output Summary (Last 24 hours) at 07/23/17 0735  Last data filed at 07/23/17 0600   Gross per 24 hour   Intake             1717 ml   Output             1425 ml   Net              292 ml     Physical Exam:  NC/AT, EOMI  RRR, surgical incision C/D/I, Chest tube in place 140cc output  LCTAB  Abd soft, non-tender, non-distended    Laboratory:  CBC:     Recent Labs  Lab 07/23/17  0334   WBC 16.03*   RBC 3.11*   HGB 8.4*   HCT 25.3*      MCV 81*   MCH 27.0   MCHC 33.2     BMP:     Recent Labs  Lab 07/23/17  0334 07/23/17  0337     --    *  --    K 5.7*  5.7*  --    CL 94*  --    CO2 20*  --    BUN 30*  --    CREATININE 1.4  --    CALCIUM 8.0*  --    MG  --  1.6     CMP:   Recent Labs  Lab 07/20/17  0100  07/23/17  0334     104  104  < > 107   CALCIUM 8.6*  8.6*  8.6*  < > 8.0*   ALBUMIN 2.9*  2.9*  --   --    PROT 6.5  6.5  --   --      140  140  < > 124*   K 3.9  3.9  3.9  < > 5.7*  5.7*   CO2 29  29  29  < > 20*     100  100  < > 94*   BUN 16  16  16  < > 30*   CREATININE 1.1  1.1  1.1  < > 1.4   ALKPHOS 84  84  --   --    ALT 35  35  --   --    AST 32  32  --   --    BILITOT 0.5  0.5  --   --    < > = values in this interval not displayed.  LFTs:     Recent Labs  Lab 07/20/17  0100   ALT 35  35   AST 32  32   ALKPHOS 84  84   BILITOT 0.5  0.5   PROT 6.5  6.5   ALBUMIN 2.9*  2.9*     Coagulation:     Recent Labs  Lab 07/22/17  0300   LABPROT 11.0   INR 1.0   APTT 25.1     Cardiac markers: No results for input(s): CKMB, CPKMB, TROPONINT, TROPONINI, MYOGLOBIN in the last 168 hours.  ABGs:     Recent Labs  Lab 07/21/17  2315   PH 7.343*   PCO2 44.4   PO2  24*   HCO3 24.1   POCSATURATED 39*   BE -2       Diagnostic Results:  X-Ray: Reviewed    ASSESSMENT/PLAN:     25 y/o M s/p replacement of aortic valve, repair of mitral valve and aortic root, placement of a neopericardium 2 Days Post-Op    Neuro:  - Continue current pain control regimen    Resp:  - Minimize supplemental O2 requirements  - Encourage IS, deep breathing, cough  - Chest tubes to remain in place to wall suction    CV:  - weaned off of epi and cardene  -   - metoprolol 12.5TID  - Sternal precautions    Heme:  - Hgb/Hct 8.4/25.3  - plts 265  - Continue to trend    ID:  - Routine post-operative Abx - Ancef x 5 doses    Renal:  - Nguyen in place  - Strict I/Os  - BUN/Cr 30/1.4  - Lasix 20 BID    FEN/GI:  - zofran for nausea  - cardiac diet  - Replace lytes PRN  - reglan    Endo:  - Endocrine following, appreciate assistance  - Accuchecks  - Insulin gtt    Dispo:  - stepdown when stable for transfer to the floor      Goran Moreira MD  7/23/2017 7:35 AM

## 2017-07-23 NOTE — SUBJECTIVE & OBJECTIVE
"Interval HPI:   Overnight events: Feeling better. Poor appetite. Was able to drink half of a smoothie this AM. Did not eat much yesterday.  Eating:   <25%   Nausea: No  Hypoglycemia and intervention: No  Fever: No  TPN and/or TF: No    /77   Pulse 87   Temp 98.6 °F (37 °C) (Oral)   Resp 10   Ht 5' 8" (1.727 m)   Wt 71.3 kg (157 lb 3 oz)   SpO2 98%   BMI 23.90 kg/m²     Labs Reviewed and Include      Recent Labs  Lab 07/23/17  0334      CALCIUM 8.0*   *   K 5.7*  5.7*   CO2 20*   CL 94*   BUN 30*   CREATININE 1.4     Lab Results   Component Value Date    WBC 16.03 (H) 07/23/2017    HGB 8.4 (L) 07/23/2017    HCT 25.3 (L) 07/23/2017    MCV 81 (L) 07/23/2017     07/23/2017     No results for input(s): TSH, FREET4 in the last 168 hours.  Lab Results   Component Value Date    HGBA1C 4.9 07/22/2017       Nutritional status:   Body mass index is 23.9 kg/m².  Lab Results   Component Value Date    ALBUMIN 2.9 (L) 07/20/2017    ALBUMIN 2.9 (L) 07/20/2017    ALBUMIN 4.7 03/23/2015     No results found for: PREALBUMIN    Estimated Creatinine Clearance: 78 mL/min (based on Cr of 1.4).    Accu-Checks  Recent Labs      07/22/17   1404  07/22/17   1527  07/22/17   1617  07/22/17   1821  07/22/17   1947  07/22/17   2104  07/22/17   2209  07/22/17   2250  07/23/17   0016  07/23/17   0110   POCTGLUCOSE  141*  121*  123*  108  145*  139*  125*  131*  144*  121*       Current Medications and/or Treatments Impacting Glycemic Control  Immunotherapy:  Immunosuppressants     None        Steroids:   Hormones     None        Pressors:    Autonomic Drugs     Start     Stop Route Frequency Ordered    07/22/17 1400  metoprolol tartrate (LOPRESSOR) split tablet 12.5 mg      -- Oral 3 times daily 07/22/17 1050        Hyperglycemia/Diabetes Medications: Antihyperglycemics     Start     Stop Route Frequency Ordered    07/23/17 0826  insulin regular (Humulin R) 100 Units in sodium chloride 0.9% 100 mL infusion      -- " IV Continuous 07/23/17 0704    07/23/17 0815  insulin aspart pen 2-4 Units      -- SubQ 3 times daily with meals 07/23/17 0704    07/23/17 0803  insulin aspart pen 0-4 Units      -- SubQ As needed (PRN) 07/23/17 0704

## 2017-07-23 NOTE — ASSESSMENT & PLAN NOTE
BG goal 110-140   Glucose at goal on intensive gtt. Drip rate 1-1.2 units/hr most recently.  Patient starting to eat today. Will change to transition gtt at 0.9 units/hr with wean parameters. Novolog 2-4 units depending on PO intake. POC glucose qAC, hs, 2am.  A1c 4.9.    Discharge Planning: ongoing, anticipating no insulin needs at discharge.

## 2017-07-23 NOTE — PROGRESS NOTES
Dr. Reyes notified UO remains 400-450 ml/hr. MD ordered STAT BMP and decrease dose of Lasix IV drip to 5 mg/hr. MD also ordered 1 G Tylenol IV Q8H. Will carry out orders and continue to monitor patient closely. VSS.

## 2017-07-23 NOTE — PLAN OF CARE
Problem: Patient Care Overview  Goal: Plan of Care Review  Outcome: Ongoing (interventions implemented as appropriate)  Plan of care reviewed with patient by Dr. Khan and MARYJANE Valadez, RN; all questions and concerns answered appropriately. Pt remains on Cardene IV drip, Lasix IV drip and Dilaudid IV PCA pain pump. Sats 100% on 2L NC. VSS. BRYNN. Pain managed well with Dilaudid IV PCA pain pump; tolerating well. OOB to chair for entire shift; tolerating well. Cardiac diet. Adequate UO. No BM. Minimal serosanguineous CT output. See flowsheet for full assessment. Will continue to monitor patient closely.

## 2017-07-23 NOTE — SUBJECTIVE & OBJECTIVE
Interval History: he feels less chest pain today     Review of Systems   Constitutional: Negative for activity change, fever and unexpected weight change.   HENT: Negative for hearing loss and tinnitus.    Respiratory: Negative for choking, shortness of breath and wheezing.    Cardiovascular: Positive for chest pain. Negative for palpitations and leg swelling.        Pain with coughing   Gastrointestinal: Negative for abdominal pain and blood in stool.   Endocrine: Negative.    Genitourinary: Negative.    Neurological: Negative.      Objective:     Vital Signs (Most Recent):  Temp: 98.8 °F (37.1 °C) (07/23/17 1100)  Pulse: 97 (07/23/17 1230)  Resp: 20 (07/23/17 1230)  BP: 115/67 (07/23/17 1200)  SpO2: 100 % (07/23/17 1230) Vital Signs (24h Range):  Temp:  [98.2 °F (36.8 °C)-98.9 °F (37.2 °C)] 98.8 °F (37.1 °C)  Pulse:  [74-97] 97  Resp:  [10-35] 20  SpO2:  [96 %-100 %] 100 %  BP: (115-123)/(58-67) 115/67  Arterial Line BP: ()/(55-83) 100/67     Weight: 71.3 kg (157 lb 3 oz)  Body mass index is 23.9 kg/m².    Estimated Creatinine Clearance: 78 mL/min (based on Cr of 1.4).    Physical Exam   Constitutional: He is oriented to person, place, and time. He appears well-developed and well-nourished.   HENT:   Head: Normocephalic and atraumatic.   Eyes: Pupils are equal, round, and reactive to light.   Cardiovascular: Normal rate and regular rhythm.    No murmur heard.  MR and AR murmurs have resolved   Pulmonary/Chest: Effort normal and breath sounds normal. He has no wheezes. He has no rales.   Abdominal: Soft. Bowel sounds are normal. He exhibits no distension. There is no tenderness.   Musculoskeletal: He exhibits no edema.   Neurological: He is alert and oriented to person, place, and time.   Skin: Skin is warm and dry.     Significant Labs:   CBC:     Recent Labs  Lab 07/21/17  2315 07/22/17  0300 07/23/17  0334   WBC 11.35 12.68 16.03*   HGB 9.1* 9.3* 8.4*   HCT 27.7* 27.9* 25.3*    214 265     CMP:    Recent Labs  Lab 07/21/17  1940 07/22/17  0300  07/23/17  0019 07/23/17  0334 07/23/17  0739   * 130*  --   --  124*  --    K 4.1 6.5*  < > 5.6* 5.7*  5.7* 5.0    101  --   --  94*  --    CO2 19* 21*  --   --  20*  --    * 108  --   --  107  --    BUN 21* 20  --   --  30*  --    CREATININE 1.2 1.0  --   --  1.4  --    CALCIUM 8.0* 7.9*  --   --  8.0*  --    ANIONGAP 13 8  --   --  10  --    EGFRNONAA >60.0 >60.0  --   --  >60.0  --    < > = values in this interval not displayed.  Significant Imaging: I have reviewed all pertinent imaging results/findings within the past 24 hours.

## 2017-07-23 NOTE — ASSESSMENT & PLAN NOTE
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 Of note, also had some hemoptysis and pleuritic chest pain with coughing prior to surgery. He is POD # 1 from AVR & MVR with prosthesis by CT surgery. While he has had fever spikes postoperatively, it is not uncommon after surgery. Of note, no new leukocytosis and no new symptoms to suggest alternate cause of infection.     - continue rocephin 2g q24h for 6 weeks after negative BCx (7/20)  - given high likely of this bug to form abscesses, recommend CT Chest & CT Abd/Pelvis to rule out alternate sites of abscess formation when stable for imaging  - will need to f/u in ID clinic on discharge.

## 2017-07-23 NOTE — PROGRESS NOTES
Dr. Reyes notified UO since start of Lasix IV drip is over 2L in 2.5 hours. MD ordered to decrease rate of Lasix IV drip to 10 mg/hr and send Mag and potassium lab draws now. VSS. Will carry out orders and continue to monitor patient closely.

## 2017-07-23 NOTE — PROGRESS NOTES
[facility]  Pulmonology  Progress Note    Patient Name: Kwan Palacio  MRN: [id number]  Admission Date: 7/19/2017  Hospital Length of Stay: 3 days  Code Status: Prior  Attending Provider: Naeem Fitch MD  Primary Care Provider: Primary Doctor No   Principal Problem: S/P AVR (aortic valve replacement)    Subjective:     Interval History: AVR yesterday with mitral valve repair; extubated post-procedurally.  States hemoptysis has largely resolved.    Objective:     Vital Signs (Most Recent):  Temp: 98.2 °F (36.8 °C) (07/22/17 1500)  Pulse: 75 (07/22/17 1600)  Resp: (!) 35 (07/22/17 1600)  BP: 112/77 (07/22/17 1000)  SpO2: 96 % (07/22/17 1600) Vital Signs (24h Range):  Temp:  [98.2 °F (36.8 °C)-101.1 °F (38.4 °C)] 98.2 °F (36.8 °C)  Pulse:  [] 75  Resp:  [12-45] 35  SpO2:  [69 %-100 %] 96 %  BP: ()/(51-84) 112/77  Arterial Line BP: ()/(44-77) 110/67     Weight: 70.1 kg (154 lb 8.7 oz)  Body mass index is 23.5 kg/m².      Intake/Output Summary (Last 24 hours) at 07/22/17 1825  Last data filed at 07/22/17 1600   Gross per 24 hour   Intake              991 ml   Output             1743 ml   Net             -752 ml       Physical Exam    Gen: Asleep, easily aroused, conversant on HF  HEENT: EOMI  CV: Tachycardic  Pulm: Slightly diminished BS only faint crackles at bases  Abd: Soft  Ext: No edema    Vents:  Vent Mode: Spont (07/21/17 1922)  Ventilator Initiated: Yes (07/21/17 1603)  Set Rate: 0 bmp (07/21/17 1922)  Vt Set: 500 mL (07/21/17 1922)  Pressure Support: 0 cmH20 (07/21/17 1922)  PEEP/CPAP: 0 cmH20 (07/21/17 1922)  Oxygen Concentration (%): 40 (07/21/17 1922)  Peak Airway Pressure: 1.9 cmH2O (07/21/17 1922)  Plateau Pressure: 0 cmH20 (07/21/17 1922)  Total Ve: 9.3 mL (07/21/17 1922)  Negative Inspiratory Force (cm H2O): -30 (07/21/17 1922)  F/VT Ratio<105 (RSBI): (!) 37.66 (07/21/17 1704)    Lines/Drains/Airways     Central Venous Catheter Line                  Percutaneous Central Line Insertion/Assessment - double lumen  07/21/17 1600 right internal jugular 1 day         Pulmonary Artery Catheter Assessment  07/21/17 1021 1 day          Drain                 Urethral Catheter 07/21/17 1015 Non-latex;Straight-tip;Temperature probe 16 Fr. 1 day         Y Chest Tube 1 and 2 07/21/17 1432 1 Mediastinal 19 Fr. 2 Mediastinal 19 Fr. 1 day          Airway                 Airway - Non-Surgical 07/21/17 0952 Endotracheal Tube 1 day          Arterial Line                 Arterial Line 07/21/17 1011 Left Radial 1 day          Peripheral Intravenous Line                 Peripheral IV - Single Lumen 07/19/17 0020 Left Forearm 3 days         Peripheral IV - Single Lumen 07/21/17 0956 Right Antecubital 1 day         Peripheral IV - Single Lumen 07/21/17 1000 Left Antecubital 1 day                Significant Labs:    CBC/Anemia Profile:    Recent Labs  Lab 07/21/17  1609 07/21/17  1609 07/21/17  2315 07/22/17  0300   WBC 24.23*  --  11.35 12.68   HGB 8.0*  --  9.1* 9.3*   HCT 24.6* 22* 27.7* 27.9*     --  224 214   MCV 83  --  83 82   RDW 16.2*  --  16.5* 16.5*        Chemistries:    Recent Labs  Lab 07/21/17  1609 07/21/17  1940 07/22/17  0300  07/22/17  0856 07/22/17  1226 07/22/17  1558   * 134* 130*  --   --   --   --    K 3.6  3.6 4.1 6.5*  < > 5.5* 6.0* 5.6*    102 101  --   --   --   --    CO2 18* 19* 21*  --   --   --   --    BUN 21* 21* 20  --   --   --   --    CREATININE 1.3 1.2 1.0  --   --   --   --    CALCIUM 7.7* 8.0* 7.9*  --   --   --   --    MG 2.5 2.0 1.8  --   --   --   --    PHOS 3.0 2.8 3.6  --   --   --   --    < > = values in this interval not displayed.    ABGs:   Recent Labs  Lab 07/21/17  2315   PH 7.343*   PCO2 44.4   HCO3 24.1   POCSATURATED 39*   BE -2     CMP:   Recent Labs  Lab 07/21/17  1609 07/21/17  1940 07/22/17  0300  07/22/17  0856 07/22/17  1226 07/22/17  1558   * 134* 130*  --   --   --   --    K 3.6  3.6 4.1 6.5*  < >  5.5* 6.0* 5.6*    102 101  --   --   --   --    CO2 18* 19* 21*  --   --   --   --    * 156* 108  --   --   --   --    BUN 21* 21* 20  --   --   --   --    CREATININE 1.3 1.2 1.0  --   --   --   --    CALCIUM 7.7* 8.0* 7.9*  --   --   --   --    ANIONGAP 15 13 8  --   --   --   --    EGFRNONAA >60.0 >60.0 >60.0  --   --   --   --    < > = values in this interval not displayed.    Significant Imaging:  I have reviewed all pertinent imaging results/findings within the past 24 hours.    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  Reports it was at its worst on 7/19 and has improved since.  He denies any leon blood clots.  Notable improvement on CXR especially s/p valve replacement.  Hemoptysis appears to have resolved.    - His hemoptysis is improving - his CXR has notably improved; he is still having fevers today so it may be worth getting a CT Chest to evaluate for any focus of infection  - Continue diuresis  - Continue abx for endocarditis        Endocarditis    Treatment per primary team and  ID recommendations.               Jose Roberto Dyson MD  Pulmonology  [facility]

## 2017-07-24 LAB
ANION GAP SERPL CALC-SCNC: 13 MMOL/L
BACTERIA SPEC AEROBE CULT: NO GROWTH
BASOPHILS # BLD AUTO: 0.01 K/UL
BASOPHILS NFR BLD: 0.1 %
BUN SERPL-MCNC: 29 MG/DL
CALCIUM SERPL-MCNC: 8.7 MG/DL
CHLORIDE SERPL-SCNC: 86 MMOL/L
CO2 SERPL-SCNC: 29 MMOL/L
CREAT SERPL-MCNC: 1.3 MG/DL
DIFFERENTIAL METHOD: ABNORMAL
EOSINOPHIL # BLD AUTO: 0 K/UL
EOSINOPHIL NFR BLD: 0.3 %
ERYTHROCYTE [DISTWIDTH] IN BLOOD BY AUTOMATED COUNT: 15.9 %
EST. GFR  (AFRICAN AMERICAN): >60 ML/MIN/1.73 M^2
EST. GFR  (NON AFRICAN AMERICAN): >60 ML/MIN/1.73 M^2
FIO2: 65
FIO2: 85
GLUCOSE SERPL-MCNC: 113 MG/DL
GLUCOSE SERPL-MCNC: 272 MG/DL (ref 70–110)
GLUCOSE SERPL-MCNC: 287 MG/DL (ref 70–110)
GLUCOSE SERPL-MCNC: 292 MG/DL (ref 70–110)
HCO3 UR-SCNC: 23 MMOL/L (ref 24–28)
HCO3 UR-SCNC: 24.9 MMOL/L (ref 24–28)
HCO3 UR-SCNC: 25.7 MMOL/L (ref 24–28)
HCT VFR BLD AUTO: 24.5 %
HCT VFR BLD CALC: 23 %PCV (ref 36–54)
HCT VFR BLD CALC: 24 %PCV (ref 36–54)
HCT VFR BLD CALC: 26 %PCV (ref 36–54)
HGB BLD-MCNC: 8.2 G/DL
LYMPHOCYTES # BLD AUTO: 1.3 K/UL
LYMPHOCYTES NFR BLD: 10.8 %
MAGNESIUM SERPL-MCNC: 1.6 MG/DL
MCH RBC QN AUTO: 27.5 PG
MCHC RBC AUTO-ENTMCNC: 33.5 G/DL
MCV RBC AUTO: 82 FL
MONOCYTES # BLD AUTO: 1.8 K/UL
MONOCYTES NFR BLD: 15 %
NEUTROPHILS # BLD AUTO: 8.8 K/UL
NEUTROPHILS NFR BLD: 73.4 %
PCO2 BLDA: 39.5 MMHG (ref 35–45)
PCO2 BLDA: 41.3 MMHG (ref 35–45)
PCO2 BLDA: 43 MMHG (ref 35–45)
PH SMN: 7.36 [PH] (ref 7.35–7.45)
PH SMN: 7.38 [PH] (ref 7.35–7.45)
PH SMN: 7.41 [PH] (ref 7.35–7.45)
PHOSPHATE SERPL-MCNC: 3.9 MG/DL
PLATELET # BLD AUTO: 274 K/UL
PMV BLD AUTO: 8.8 FL
PO2 BLDA: 291 MMHG (ref 80–100)
PO2 BLDA: 354 MMHG (ref 80–100)
PO2 BLDA: 51 MMHG (ref 40–60)
POC BE: -2 MMOL/L
POC BE: 0 MMOL/L
POC BE: 1 MMOL/L
POC IONIZED CALCIUM: 1.05 MMOL/L (ref 1.06–1.42)
POC IONIZED CALCIUM: 1.07 MMOL/L (ref 1.06–1.42)
POC IONIZED CALCIUM: 1.52 MMOL/L (ref 1.06–1.42)
POC SATURATED O2: 100 % (ref 95–100)
POC SATURATED O2: 100 % (ref 95–100)
POC SATURATED O2: 84 % (ref 95–100)
POC TCO2: 24 MMOL/L (ref 24–29)
POC TCO2: 26 MMOL/L (ref 23–27)
POC TCO2: 27 MMOL/L (ref 23–27)
POCT GLUCOSE: 112 MG/DL (ref 70–110)
POCT GLUCOSE: 128 MG/DL (ref 70–110)
POCT GLUCOSE: 134 MG/DL (ref 70–110)
POCT GLUCOSE: 146 MG/DL (ref 70–110)
POCT GLUCOSE: 153 MG/DL (ref 70–110)
POTASSIUM BLD-SCNC: 5.3 MMOL/L (ref 3.5–5.1)
POTASSIUM BLD-SCNC: 5.5 MMOL/L (ref 3.5–5.1)
POTASSIUM BLD-SCNC: 6 MMOL/L (ref 3.5–5.1)
POTASSIUM SERPL-SCNC: 3.6 MMOL/L
POTASSIUM SERPL-SCNC: 3.6 MMOL/L
POTASSIUM SERPL-SCNC: 3.8 MMOL/L
POTASSIUM SERPL-SCNC: 4.2 MMOL/L
RBC # BLD AUTO: 2.98 M/UL
SAMPLE: ABNORMAL
SODIUM BLD-SCNC: 130 MMOL/L (ref 136–145)
SODIUM BLD-SCNC: 130 MMOL/L (ref 136–145)
SODIUM BLD-SCNC: 131 MMOL/L (ref 136–145)
SODIUM SERPL-SCNC: 128 MMOL/L
WBC # BLD AUTO: 12 K/UL

## 2017-07-24 PROCEDURE — 25000003 PHARM REV CODE 250: Performed by: STUDENT IN AN ORGANIZED HEALTH CARE EDUCATION/TRAINING PROGRAM

## 2017-07-24 PROCEDURE — 84132 ASSAY OF SERUM POTASSIUM: CPT | Mod: 91

## 2017-07-24 PROCEDURE — S0028 INJECTION, FAMOTIDINE, 20 MG: HCPCS | Performed by: THORACIC SURGERY (CARDIOTHORACIC VASCULAR SURGERY)

## 2017-07-24 PROCEDURE — 85025 COMPLETE CBC W/AUTO DIFF WBC: CPT

## 2017-07-24 PROCEDURE — 97530 THERAPEUTIC ACTIVITIES: CPT

## 2017-07-24 PROCEDURE — 20000000 HC ICU ROOM

## 2017-07-24 PROCEDURE — 25000003 PHARM REV CODE 250: Performed by: THORACIC SURGERY (CARDIOTHORACIC VASCULAR SURGERY)

## 2017-07-24 PROCEDURE — 63600175 PHARM REV CODE 636 W HCPCS: Performed by: THORACIC SURGERY (CARDIOTHORACIC VASCULAR SURGERY)

## 2017-07-24 PROCEDURE — 99222 1ST HOSP IP/OBS MODERATE 55: CPT | Mod: ,,, | Performed by: ANESTHESIOLOGY

## 2017-07-24 PROCEDURE — 99231 SBSQ HOSP IP/OBS SF/LOW 25: CPT | Mod: ,,, | Performed by: NURSE PRACTITIONER

## 2017-07-24 PROCEDURE — 83735 ASSAY OF MAGNESIUM: CPT

## 2017-07-24 PROCEDURE — 63600175 PHARM REV CODE 636 W HCPCS: Performed by: STUDENT IN AN ORGANIZED HEALTH CARE EDUCATION/TRAINING PROGRAM

## 2017-07-24 PROCEDURE — 84132 ASSAY OF SERUM POTASSIUM: CPT

## 2017-07-24 PROCEDURE — 84100 ASSAY OF PHOSPHORUS: CPT

## 2017-07-24 PROCEDURE — 99232 SBSQ HOSP IP/OBS MODERATE 35: CPT | Mod: ,,, | Performed by: INTERNAL MEDICINE

## 2017-07-24 PROCEDURE — A4216 STERILE WATER/SALINE, 10 ML: HCPCS | Performed by: THORACIC SURGERY (CARDIOTHORACIC VASCULAR SURGERY)

## 2017-07-24 PROCEDURE — 80048 BASIC METABOLIC PNL TOTAL CA: CPT

## 2017-07-24 RX ORDER — INSULIN ASPART 100 [IU]/ML
0-5 INJECTION, SOLUTION INTRAVENOUS; SUBCUTANEOUS
Status: DISCONTINUED | OUTPATIENT
Start: 2017-07-24 | End: 2017-08-08

## 2017-07-24 RX ORDER — AMLODIPINE BESYLATE 5 MG/1
5 TABLET ORAL DAILY
Status: DISCONTINUED | OUTPATIENT
Start: 2017-07-24 | End: 2017-07-27

## 2017-07-24 RX ORDER — IBUPROFEN 200 MG
24 TABLET ORAL
Status: DISCONTINUED | OUTPATIENT
Start: 2017-07-24 | End: 2017-08-08

## 2017-07-24 RX ORDER — GLUCAGON 1 MG
1 KIT INJECTION
Status: DISCONTINUED | OUTPATIENT
Start: 2017-07-24 | End: 2017-08-08

## 2017-07-24 RX ORDER — METOPROLOL TARTRATE 25 MG/1
25 TABLET, FILM COATED ORAL 3 TIMES DAILY
Status: DISCONTINUED | OUTPATIENT
Start: 2017-07-24 | End: 2017-07-27

## 2017-07-24 RX ORDER — HYDRALAZINE HYDROCHLORIDE 20 MG/ML
10 INJECTION INTRAMUSCULAR; INTRAVENOUS ONCE
Status: COMPLETED | OUTPATIENT
Start: 2017-07-24 | End: 2017-07-24

## 2017-07-24 RX ORDER — LABETALOL HYDROCHLORIDE 5 MG/ML
10 INJECTION, SOLUTION INTRAVENOUS EVERY 4 HOURS PRN
Status: DISCONTINUED | OUTPATIENT
Start: 2017-07-24 | End: 2017-07-25

## 2017-07-24 RX ORDER — IBUPROFEN 200 MG
16 TABLET ORAL
Status: DISCONTINUED | OUTPATIENT
Start: 2017-07-24 | End: 2017-08-08

## 2017-07-24 RX ADMIN — ONDANSETRON 4 MG: 2 INJECTION INTRAMUSCULAR; INTRAVENOUS at 11:07

## 2017-07-24 RX ADMIN — ACETAMINOPHEN 1000 MG: 10 INJECTION, SOLUTION INTRAVENOUS at 01:07

## 2017-07-24 RX ADMIN — Medication: at 10:07

## 2017-07-24 RX ADMIN — FUROSEMIDE 40 MG: 10 INJECTION, SOLUTION INTRAVENOUS at 08:07

## 2017-07-24 RX ADMIN — ONDANSETRON 4 MG: 2 INJECTION INTRAMUSCULAR; INTRAVENOUS at 05:07

## 2017-07-24 RX ADMIN — NICARDIPINE HYDROCHLORIDE 5 MG/HR: 0.2 INJECTION, SOLUTION INTRAVENOUS at 01:07

## 2017-07-24 RX ADMIN — KETOROLAC TROMETHAMINE 15 MG: 15 INJECTION, SOLUTION INTRAMUSCULAR; INTRAVENOUS at 09:07

## 2017-07-24 RX ADMIN — NICOTINE 1 PATCH: 7 PATCH, EXTENDED RELEASE TRANSDERMAL at 11:07

## 2017-07-24 RX ADMIN — FAMOTIDINE 20 MG: 10 INJECTION, SOLUTION INTRAVENOUS at 08:07

## 2017-07-24 RX ADMIN — ASPIRIN 325 MG ORAL TABLET 325 MG: 325 PILL ORAL at 08:07

## 2017-07-24 RX ADMIN — FUROSEMIDE 40 MG: 10 INJECTION, SOLUTION INTRAVENOUS at 05:07

## 2017-07-24 RX ADMIN — POTASSIUM CHLORIDE 20 MEQ: 200 INJECTION, SOLUTION INTRAVENOUS at 01:07

## 2017-07-24 RX ADMIN — HYDRALAZINE HYDROCHLORIDE 10 MG: 20 INJECTION INTRAMUSCULAR; INTRAVENOUS at 12:07

## 2017-07-24 RX ADMIN — Medication 3 ML: at 10:07

## 2017-07-24 RX ADMIN — HYDRALAZINE HYDROCHLORIDE 25 MG: 25 TABLET, FILM COATED ORAL at 09:07

## 2017-07-24 RX ADMIN — METOPROLOL TARTRATE 25 MG: 25 TABLET ORAL at 01:07

## 2017-07-24 RX ADMIN — METOPROLOL TARTRATE 25 MG: 25 TABLET ORAL at 09:07

## 2017-07-24 RX ADMIN — LABETALOL HYDROCHLORIDE 10 MG: 5 INJECTION, SOLUTION INTRAVENOUS at 10:07

## 2017-07-24 RX ADMIN — KETOROLAC TROMETHAMINE 15 MG: 15 INJECTION, SOLUTION INTRAMUSCULAR; INTRAVENOUS at 02:07

## 2017-07-24 RX ADMIN — Medication 3 ML: at 05:07

## 2017-07-24 RX ADMIN — METOCLOPRAMIDE 10 MG: 10 TABLET ORAL at 05:07

## 2017-07-24 RX ADMIN — AMLODIPINE BESYLATE 5 MG: 5 TABLET ORAL at 08:07

## 2017-07-24 RX ADMIN — FAMOTIDINE 20 MG: 10 INJECTION, SOLUTION INTRAVENOUS at 09:07

## 2017-07-24 RX ADMIN — MUPIROCIN 1 G: 20 OINTMENT TOPICAL at 08:07

## 2017-07-24 RX ADMIN — HYDRALAZINE HYDROCHLORIDE 25 MG: 25 TABLET, FILM COATED ORAL at 01:07

## 2017-07-24 RX ADMIN — METOCLOPRAMIDE 10 MG: 10 TABLET ORAL at 03:07

## 2017-07-24 RX ADMIN — Medication: at 01:07

## 2017-07-24 RX ADMIN — METOCLOPRAMIDE 10 MG: 10 TABLET ORAL at 10:07

## 2017-07-24 RX ADMIN — HYDRALAZINE HYDROCHLORIDE 25 MG: 25 TABLET, FILM COATED ORAL at 05:07

## 2017-07-24 RX ADMIN — LABETALOL HYDROCHLORIDE 10 MG: 5 INJECTION, SOLUTION INTRAVENOUS at 02:07

## 2017-07-24 RX ADMIN — Medication 3 ML: at 01:07

## 2017-07-24 RX ADMIN — ACETAMINOPHEN 1000 MG: 10 INJECTION, SOLUTION INTRAVENOUS at 05:07

## 2017-07-24 RX ADMIN — LABETALOL HYDROCHLORIDE 10 MG: 5 INJECTION, SOLUTION INTRAVENOUS at 06:07

## 2017-07-24 RX ADMIN — Medication: at 07:07

## 2017-07-24 RX ADMIN — MAGNESIUM SULFATE IN WATER 2 G: 40 INJECTION, SOLUTION INTRAVENOUS at 06:07

## 2017-07-24 RX ADMIN — POTASSIUM CHLORIDE 20 MEQ: 200 INJECTION, SOLUTION INTRAVENOUS at 06:07

## 2017-07-24 RX ADMIN — KETOROLAC TROMETHAMINE 15 MG: 15 INJECTION, SOLUTION INTRAMUSCULAR; INTRAVENOUS at 07:07

## 2017-07-24 RX ADMIN — NICARDIPINE HYDROCHLORIDE 10 MG/HR: 0.2 INJECTION, SOLUTION INTRAVENOUS at 06:07

## 2017-07-24 RX ADMIN — HYDRALAZINE HYDROCHLORIDE 25 MG: 25 TABLET, FILM COATED ORAL at 03:07

## 2017-07-24 RX ADMIN — Medication 12.5 MG: at 05:07

## 2017-07-24 RX ADMIN — CEFTRIAXONE 2 G: 2 INJECTION, SOLUTION INTRAVENOUS at 08:07

## 2017-07-24 NOTE — ANESTHESIA POSTPROCEDURE EVALUATION
"Anesthesia Post Evaluation    Patient: Kwan Palacio    Procedure(s) Performed: Procedure(s) (LRB):  REPLACEMENT-VALVE-AORTIC (N/A)  REPAIR VALVE-MITRAL (N/A)  REPAIR AORTIC ROOT (N/A)  PLACEMENT-NEOPERICARDIUM (N/A)    Final Anesthesia Type: general  Patient location during evaluation: ICU  Patient participation: Yes- Able to Participate  Level of consciousness: awake and alert  Post-procedure vital signs: reviewed and stable  Pain management: adequate  Airway patency: patent  PONV status at discharge: No PONV  Anesthetic complications: no      Cardiovascular status: hemodynamically stable  Respiratory status: unassisted  Hydration status: euvolemic  Follow-up not needed.        Visit Vitals  BP (!) 107/53 (BP Location: Right arm, Patient Position: Sitting, BP Method: Automatic)   Pulse 103   Temp 36.9 °C (98.5 °F) (Oral)   Resp (!) 22   Ht 5' 8" (1.727 m)   Wt 68.2 kg (150 lb 5.7 oz)   SpO2 (!) 93%   BMI 22.86 kg/m²       Pain/Adonis Score: Pain Assessment Performed: Yes (7/24/2017  7:00 AM)  Presence of Pain: complains of pain/discomfort (7/24/2017  7:00 AM)  Pain Rating Prior to Med Admin: 6 (7/24/2017  7:25 AM)  Pain Rating Post Med Admin: 5 (7/24/2017  7:55 AM)      "

## 2017-07-24 NOTE — ASSESSMENT & PLAN NOTE
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 Of note, also had some hemoptysis and pleuritic chest pain with coughing prior to surgery. He is POD # 1 from AVR & MVR with prosthesis by CT surgery. While he has had fever spikes postoperatively, it is not uncommon after surgery. Of note, no new leukocytosis and no new symptoms to suggest alternate cause of infection.     - continue rocephin 2g q24h for 6 weeks after negative BCx (7/20)  - given high likely of this bug to form abscesses, recommend CT Chest & CT Abd/Pelvis to rule out alternate sites of abscess formation when stable for imaging  - will need to f/u in ID clinic on discharge.  - will sign off at this time; please contact with any further questions

## 2017-07-24 NOTE — PLAN OF CARE
Problem: Patient Care Overview  Goal: Plan of Care Review  Outcome: Ongoing (interventions implemented as appropriate)  VS & assessment as documented. Pt OOB to chair for approx 8 hours today. Ambulated in halls with PT. Tolerated well. Continue with Dilaudid PCA for pain control. Maintain MAPS between 60-80. Scheduled medications adjusted today. PRN medications given as needed.  CT with minimal output. Awaiting stepdown bed. Plan of care discussed with patient and family both verbalize and understand

## 2017-07-24 NOTE — ASSESSMENT & PLAN NOTE
BG goal 140-180 since now eating. Continue low dose correction scale, monitor AC/HS only until tonight. Will sign off. Thank you for this consult. Reconsult if needed.     A1c 4.9.    Discharge Planning: No glycemic medications or Endocrine f/u needed

## 2017-07-24 NOTE — PROGRESS NOTES
[facility]  Infectious Disease  Progress Note    Patient Name: Kwan Palacio  MRN: [id number]  Admission Date: 7/19/2017  Length of Stay: 5 days  Attending Physician: Naeem Fitch MD  Primary Care Provider: Primary Doctor No    Isolation Status: No active isolations      Subjective:     Principal Problem:S/P AVR (aortic valve replacement)    HPI: Pt is a 27 yo male with a recently diagnosed bicuspid aortic valve who presents to [facility] as a transfer from [facility] for streptococcus angionosis endocarditis. Symptoms began 2 months ago with fevers and malaise. Went to the ED multiple times before being admitted 2 weeks ago. Blood cultures from 07/04/2017 positive for streptococcus angionosis; TTE & DOUG demonstrated vegetations & newly diagnosed bicuspid aortic valve. Patient was treated with gentamicin & rocephin. Yesterday, while in hospital, the patient developed a productive cough with hemoptysis and transferred to our hospital for evaluation by CT surgery.    Today, pt complains of continued hemoptysis. He has generalized pain worst in his throat & chest, particularly his ribs; rib pain rated 8/10 and painful with movement & inspiration. He felt fevers/chills last night, no documented fever. He denies headaches, blurry vision, n/v, or rash. He has had a 30 lb wt loss over the last two months. Positive risk factors for endocarditis include a history of drug abuse (denies Iv) & valvular disease (bicuspid aortic valve, recently diagnosed at [facility].)  CXR demonstrate parenchymal opacities consistent with pulmonary edema, BL PNA or pulmonary hemorrhage. 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 No leukocytosis, blood cultures NGTD.  Currently on rocephin.     Interval History: Denies chest pain today. Continues to have productive cough  without hemoptysis. Pain at surgical sites. Endorses some leg swelling.    Review of Systems   Constitutional: Negative for activity change, fever and unexpected weight change.   HENT: Negative for hearing loss and tinnitus.    Respiratory: Negative for choking, shortness of breath and wheezing.    Cardiovascular: Negative for chest pain and palpitations.        Pain with coughing   Gastrointestinal: Negative for abdominal pain and blood in stool.   Endocrine: Negative.    Genitourinary: Negative.    Neurological: Negative.      Objective:     Vital Signs (Most Recent):  Temp: 98.6 °F (37 °C) (07/24/17 1500)  Pulse: 83 (07/24/17 1700)  Resp: (!) 24 (07/24/17 1700)  BP: 123/69 (07/24/17 1700)  SpO2: 98 % (07/24/17 1700) Vital Signs (24h Range):  Temp:  [98.5 °F (36.9 °C)-98.9 °F (37.2 °C)] 98.6 °F (37 °C)  Pulse:  [] 83  Resp:  [11-32] 24  SpO2:  [88 %-100 %] 98 %  BP: (107-138)/(53-77) 123/69     Weight: 68.2 kg (150 lb 5.7 oz)  Body mass index is 22.86 kg/m².    Estimated Creatinine Clearance: 83.8 mL/min (based on Cr of 1.3).    Physical Exam   Constitutional: He is oriented to person, place, and time. He appears well-developed and well-nourished.   HENT:   Head: Normocephalic and atraumatic.   Eyes: Pupils are equal, round, and reactive to light.   Cardiovascular: Normal rate and regular rhythm.    No murmur heard.  Murmur present although softer from previous exams   Pulmonary/Chest: Effort normal and breath sounds normal. He has no wheezes. He has no rales.   Abdominal: Soft. Bowel sounds are normal. He exhibits no distension. There is no tenderness.   Musculoskeletal: He exhibits edema (lower & upper extremity swelling).   Neurological: He is alert and oriented to person, place, and time.   Skin: Skin is warm and dry.   Psychiatric: He has a normal mood and affect. His behavior is normal.     Significant Labs:   CBC:     Recent Labs  Lab 07/23/17  0334 07/24/17  0448   WBC 16.03* 12.00   HGB 8.4* 8.2*    HCT 25.3* 24.5*    274     CMP:   Recent Labs  Lab 07/23/17  0334  07/23/17  1940 07/24/17  0030 07/24/17  0448 07/24/17  0758   *  --  127*  --  128*  --    K 5.7*  5.7*  < > 3.8  3.8 3.8 3.6  3.6 4.2   CL 94*  --  90*  --  86*  --    CO2 20*  --  24  --  29  --      --  114*  --  113*  --    BUN 30*  --  29*  --  29*  --    CREATININE 1.4  --  1.3  --  1.3  --    CALCIUM 8.0*  --  8.2*  --  8.7  --    ANIONGAP 10  --  13  --  13  --    EGFRNONAA >60.0  --  >60.0  --  >60.0  --    < > = values in this interval not displayed.  Significant Imaging: I have reviewed all pertinent imaging results/findings within the past 24 hours.    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 Of note, also had some hemoptysis and pleuritic chest pain with coughing prior to surgery. He is POD # 3 from AVR & MVR with prosthesis by CT surgery. While he has had fever spikes postoperatively, it is not uncommon after surgery. Of note, no new leukocytosis and no new symptoms to suggest alternate cause of infection.     - continue rocephin 2g q24h for 6 weeks after negative BCx (7/20)  - given high likely of this bug to form abscesses, recommend CT Chest & CT Abd/Pelvis to rule out alternate sites of abscess formation when stable for imaging  - will need to f/u in ID clinic on discharge.  - will sign off at this time; please contact with any further questions        Nora Hess MD  Infectious Disease  [facility]

## 2017-07-24 NOTE — PT/OT/SLP PROGRESS
"Physical Therapy  Treatment    Kwan Palacio   MRN: [id number]   Admitting Diagnosis: S/P AVR (aortic valve replacement)    PT Received On: 07/24/17  PT Start Time: 1421     PT Stop Time: 1450    PT Total Time (min): 29 min       Billable Minutes:  Therapeutic Activity 29 minutes    Treatment Type: Treatment  PT/PTA: PT             General Precautions: Standard, fall, sternal  Orthopedic Precautions: Full weight bearing   Braces: N/A         Subjective:  Communicated with RN prior to session.  Patient amenable to treatment. "I want to walk."     Pain/Comfort  Pain Rating 1: 7/10  Location - Orientation 1: midline  Location 1: sternal  Pain Addressed 1: Pre-medicate for activity, Distraction, Nurse notified  Pain Rating Post-Intervention 1: 7/10    Objective:   Patient found with: arterial line, blood pressure cuff, chest tube, PICC line, peripheral IV, telemetry    Functional Mobility:  Bed Mobility:   Rolling/Turning to Left:  (pt found in standing upon PT arrival, up from chair. Reviewed logrolling, needs reinforcement. )    Transfers:  Sit <> Stand Assistance: Independent  Sit <> Stand Assistive Device: No Assistive Device    Gait:   Gait Distance: pt ambulated 160 ft   Assistance 1: Supervision  Gait Assistive Device: No device  Gait Pattern: swing-through gait  Gait Deviation(s): forward lean    Stairs:  Defer to next visit - 1 curb step    Balance:   Static Sit: GOOD+: Takes MAXIMAL challenges from all directions.    Dynamic Sit: GOOD: Maintains balance through MODERATE excursions of active trunk movement  Static Stand: GOOD: Takes MODERATE challenges from all directions  Dynamic stand: GOOD-: Needs SUPERVISION only during gait and able to self right with moderate      Therapeutic Activities and Exercises:  Educated pt on sternal pcx, safety with gait, CT safety, logrolling and PT POC.      AM-PAC 6 CLICK MOBILITY  How much help from another person does this patient currently need?   1 = Unable, " Total/Dependent Assistance  2 = A lot, Maximum/Moderate Assistance  3 = A little, Minimum/Contact Guard/Supervision  4 = None, Modified [place]/Independent    Turning over in bed (including adjusting bedclothes, sheets and blankets)?: 3  Sitting down on and standing up from a chair with arms (e.g., wheelchair, bedside commode, etc.): 4  Moving from lying on back to sitting on the side of the bed?: 3  Moving to and from a bed to a chair (including a wheelchair)?: 3  Need to walk in hospital room?: 3  Climbing 3-5 steps with a railing?: 3  Total Score: 19    AM-PAC Raw Score CMS G-Code Modifier Level of Impairment Assistance   6 % Total / Unable   7 - 9 CM 80 - 100% Maximal Assist   10 - 14 CL 60 - 80% Moderate Assist   15 - 19 CK 40 - 60% Moderate Assist   20 - 22 CJ 20 - 40% Minimal Assist   23 CI 1-20% SBA / CGA   24 CH 0% Independent/ Mod I     Patient left up in chair with all lines intact, call button in reach and RN notified.    Vital signs during gait:  O2 99% RA  HR 82  /70  RPE: 3    Assessment:  Kwan Palacio is a 26 y.o. male with a medical diagnosis of S/P AVR (aortic valve replacement) and presents with decreased activity tolerance s/p MVR. Patient is young and motivated and will return to PLOF over time with therapy intervention while in the hospital. Patient would benefit from a walking program upon d/c home. Patient to increase gait distance to increase strength to prevent falls and will continue PT POC as tolerated.    Rehab identified problem list/impairments: Rehab identified problem list/impairments: pain, impaired skin, weakness, impaired self care skills    Rehab potential is excellent.    Activity tolerance: Excellent    Discharge recommendations: Discharge Facility/Level Of Care Needs: home     Barriers to discharge: Barriers to Discharge: None (pt reports he will live with his friend in a 1st floor apt in [place] [facility] )    Equipment recommendations: Equipment Needed  After Discharge: none     GOALS:    Physical Therapy Goals        Problem: Physical Therapy Goal    Goal Priority Disciplines Outcome Goal Variances Interventions   Physical Therapy Goal     PT/OT, PT      Description:  Goals to be met by:      Patient will increase functional independence with mobility by performin. Supine to sit with Stand-by Assistance  2. Sit to supine with Stand-by Assistance  3. Sit to stand transfer with Stand-by Assistance  4. Gait  x 100 feet with Stand-by Assistance                    PLAN:    Patient to be seen 4 x/week  to address the above listed problems via gait training, therapeutic activities, therapeutic exercises  Plan of Care expires: 17  Plan of Care reviewed with: patient         Grace DIANA Downing, PT, DPT  2017

## 2017-07-24 NOTE — OP NOTE
DATE OF PROCEDURE:  07/21/2017.    PREOPERATIVE DIAGNOSES:  1.  Severe aortic insufficiency.  2.  Severe mitral insufficiency.  3.  Infective endocarditis.  4.  Bicuspid aortic valve.  5.  Anemia of chronic disease.    POSTOPERATIVE DIAGNOSES:  1.  Severe aortic insufficiency.  2.  Severe mitral insufficiency.  3.  Infective endocarditis.  4.  Bicuspid aortic valve.  5.  Anemia of chronic disease.    PROCEDURES:  1.  Aortic valve replacement with a 23 mm [place] Epic valve.  2.  Aortic augmentation with a bovine pericardial patch.  3.  Mitral valve repair with a bovine pericardial patch.    SURGEON:  Naeem Fitch M.D.    ASSISTANTS:  Javier Khan M.D. (RES), PGY-6, and MARGARET Price    ANESTHESIA:  General.    INDICATIONS FOR PROCEDURE:  This is a 26-year-old gentleman who had a two-month   history of malaise and night sweats.  He was evaluated on the [place].  He   had a transesophageal echo from two weeks ago which demonstrated the valvular   lesions.  He was transferred yesterday to [facility] for further care.  Repeat echo   here was essentially the same.  I reviewed his echo. I talked to the patient and   his family.  I explained the risks of endocarditis with his valvular lesions   and the possibility of having to do two valve replacements in reconstruction.    We talked to him about valve choices in our conversation with him and the   family.  He was not confident that he could be on long-term anticoagulation with   warfarin and would likely be better off with a tissue valve.    OPERATIVE FINDINGS:  The mitral valve had a drop lesion on the anterior leaflet   with a large defect in vegetation.  The annulus was intact, but had evidence of   induration and old inflammation.  The aortic valve was a true bicuspid valve   with two raphae.  The noncoronary cusp was almost completely gone.  The mitral   valve was prepared.  The annulus was reinforced.  We had no active signs of   abscess.  All tissue  was debrided to strong tissue.  The aortic valve prosthesis   was 23 mm and it was well seated with no paravalvular leak and the aorta was   closed with a patch due to its small size.    DOUG demonstrated no mitral regurgitation, normal functioning mitral valve, and a   normal functioning aortic prosthesis when we were done.    BLOOD LOSS:  100 mL.    PROCEDURE IN DETAIL:  The patient was taken urgently to the Operating Room and   placed supine upon the table.  General anesthesia was administered.  Monitoring   lines were placed.  DOUG was performed which confirmed our previous diagnoses.    The patient was prepped and draped sterilely.  Median sternotomy was performed   through a midline incision.  Pericardial well was created.  The patient was   heparinized.  Cannulation was performed in the ascending aorta, the superior   vena cava, and inferior vena cava.  A retrograde cardioplegia catheter was   placed through the right atrial wall into the coronary sinus.  A LV vent was   placed through the right superior pulmonary vein into the left ventricle.    With the patient on cardiopulmonary bypass, we crossclamped the aorta and   delivered cardioplegia retrograde.  We cooled to 32 degrees and maintained   retrograde cardioplegia and intermittent doses down the right coronary directly.    The aorta was opened through an oblique aortotomy down into the noncoronary   sinus of Valsalva.  We examined the valve and elevated it.  We excised the   leaflets.  It was as described above.  We then performed a thorough   investigation of the left ventricular outflow tract including the aorto-mitral   curtain.  There was evidence of recent infection in this area.  There was a   small abscess cavity that was clean and did not communicate with anything.    There was a large defect in the anterior leaflet of the mitral valve and the   body of the leaflet up to the annulus.  This was debrided to normal valvular   tissue.  We then  sterilized all these areas with phenol and alcohol.  We sent   specimens for culture and pathology.  A treated pericardial patch was brought on   to the field sterilely.  We then patched the mitral valve defect with a running   5-0 suture and a pericardial patch fashioned to fit this defect.    We then reinforced the annulus with another piece of pericardium and we had good   tissue and no evidence of tunneling or abscess formation.  We placed   circumferential 2-0 Ethibond sutures through the aortic valve annulus and then   sized to 23 mm and a 23 mm prosthesis was brought into the field.  We placed   sewing stitches through the sewing ring, lowered it into place, and tied it   circumferentially.  There was room for the left coronary and the right coronary   artery.  We then had a small aorta to deal with, so we fashioned another patch   of pericardium and closed the aorta with a small patch under no tension.    De-airing maneuvers were performed.  Crossclamp was removed.  Heart was   reperfused.  We then  easily from cardiopulmonary bypass with excellent   function.  DOUG demonstrated no mitral valve regurgitation, no shunts, no   abscess cavity, and no leak to the aortic valve prosthesis.  It was well seated   with a mean gradient of 13 across it.  Protamine was administered.  Cannulas   were removed.  Excellent hemostasis was achieved.  Drains were placed.  Sternum   was reapproximated with interrupted stainless steel wires.  Skin and fascia were   closed in layers.  The patient was taken back to the ICU in stable condition.      HANNAH  dd: 07/21/2017 15:52:08 (CDT)  td: 07/21/2017 17:57:58 (CD)  Doc ID   #[id number]  Job ID #[id number]    CC:

## 2017-07-24 NOTE — PROGRESS NOTES
Progress Note  Cardiothoracic Surgery    Admit Date: 7/19/2017  Post-operative Day: 3 Days Post-Op  Hospital Day: 6    SUBJECTIVE:     Follow-up For: Procedure(s) (LRB):  REPLACEMENT-VALVE-AORTIC (N/A)  REPAIR VALVE-MITRAL (N/A)  REPAIR AORTIC ROOT (N/A)  PLACEMENT-NEOPERICARDIUM (N/A)     Interval History:  NAEON. Afebrile. Resting comfortably in bed this AM, on cell phone. However, continues to c/o uncontrolled pain on PCA - endorses 9/10 pain despite appearance of comfort. Remains on Cardene gtt this AM for HTN. Responded well to diuretic - 7.9L UOP over past 24h. Tolerating diet.     Scheduled Meds:   acetaminophen  1,000 mg Intravenous Q8H    amlodipine  5 mg Oral Daily    aspirin  325 mg Oral Daily    cefTRIAXone (ROCEPHIN) IVPB  2 g Intravenous Q24H    famotidine (PF)  20 mg Intravenous BID    furosemide  40 mg Intravenous BID    hydrALAZINE  25 mg Oral Q8H    metoclopramide HCl  10 mg Oral TID AC    metoprolol tartrate  25 mg Oral TID    nicotine  1 patch Transdermal Daily    ondansetron  4 mg Intravenous Q6H    polyethylene glycol  17 g Oral Daily    sodium chloride 0.9%  3 mL Intravenous Q8H     Infusions/Drips:   hydromorphone in 0.9 % NaCl 6 mg/30 ml       PRN Meds: albumin human 5%, albuterol-ipratropium 2.5mg-0.5mg/3mL, [COMPLETED] calcium gluconate IVPB **AND** calcium gluconate IVPB, dextrose 50%, dextrose 50%, glucagon (human recombinant), glucose, glucose, hydrALAZINE, insulin aspart, ketorolac, labetalol, lactulose, magnesium sulfate IVPB, metoclopramide HCl, naloxone, ondansetron, pneumoc 13-edith conj-dip cr(PF), potassium chloride **AND** potassium chloride **AND** potassium chloride, sodium phosphate IVPB, sodium phosphate IVPB, sodium phosphate IVPB    Review of patient's allergies indicates:  No Known Allergies    OBJECTIVE:     Vital Signs (Most Recent)  Temp: 98.5 °F (36.9 °C) (07/24/17 0700)  Pulse: 103 (07/24/17 0815)  Resp: (!) 22 (07/24/17 0815)  BP: (!) 107/53 (07/24/17  0815)  SpO2: (!) 93 % (07/24/17 0815)    Admission Weight: 68.3 kg (150 lb 9.2 oz) (07/20/17 0059)   Most Recent Weight: 68.2 kg (150 lb 5.7 oz) (07/24/17 0500)    Vital Signs Range (Last 24H):  Temp:  [98.5 °F (36.9 °C)-98.9 °F (37.2 °C)]   Pulse:  []   Resp:  [11-32]   BP: (104-138)/(53-77)   SpO2:  [88 %-100 %]   Arterial Line BP: ()/(56-88)     I & O (Last 24H):    Intake/Output Summary (Last 24 hours) at 07/24/17 0837  Last data filed at 07/24/17 0800   Gross per 24 hour   Intake          2013.25 ml   Output             8405 ml   Net         -6391.75 ml     Physical Exam:  NC/AT, EOMI  RRR, surgical incision C/D/I, Chest tube in place >100 cc output  LCTAB  Abd soft, non-tender, non-distended    Laboratory:  CBC:     Recent Labs  Lab 07/24/17 0448   WBC 12.00   RBC 2.98*   HGB 8.2*   HCT 24.5*      MCV 82   MCH 27.5   MCHC 33.5     BMP:     Recent Labs  Lab 07/24/17 0448 07/24/17 0758   *  --    *  --    K 3.6  3.6 4.2   CL 86*  --    CO2 29  --    BUN 29*  --    CREATININE 1.3  --    CALCIUM 8.7  --    MG 1.6  --      CMP:   Recent Labs  Lab 07/20/17  0100  07/24/17 0448 07/24/17  0758     104  104  < > 113*  --    CALCIUM 8.6*  8.6*  8.6*  < > 8.7  --    ALBUMIN 2.9*  2.9*  --   --   --    PROT 6.5  6.5  --   --   --      140  140  < > 128*  --    K 3.9  3.9  3.9  < > 3.6  3.6 4.2   CO2 29  29  29  < > 29  --      100  100  < > 86*  --    BUN 16  16  16  < > 29*  --    CREATININE 1.1  1.1  1.1  < > 1.3  --    ALKPHOS 84  84  --   --   --    ALT 35  35  --   --   --    AST 32  32  --   --   --    BILITOT 0.5  0.5  --   --   --    < > = values in this interval not displayed.  LFTs:     Recent Labs  Lab 07/20/17  0100   ALT 35  35   AST 32  32   ALKPHOS 84  84   BILITOT 0.5  0.5   PROT 6.5  6.5   ALBUMIN 2.9*  2.9*     Coagulation:     Recent Labs  Lab 07/22/17  0300   LABPROT 11.0   INR 1.0   APTT 25.1     Cardiac markers: No  results for input(s): CKMB, CPKMB, TROPONINT, TROPONINI, MYOGLOBIN in the last 168 hours.  ABGs:     Recent Labs  Lab 07/21/17  2315   PH 7.343*   PCO2 44.4   PO2 24*   HCO3 24.1   POCSATURATED 39*   BE -2       Diagnostic Results:  X-Ray: Reviewed    ASSESSMENT/PLAN:     27 y/o M s/p replacement of aortic valve, repair of mitral valve and aortic root, placement of a neopericardium 3 Days Post-Op    Neuro:  - Continue current pain control regimen    Resp:  - Minimize supplemental O2 requirements  - Encourage IS, deep breathing, cough  - Chest tubes to remain in place to wall suction    CV:  - Off pressors  - On Cardene for HTN - will d/c  - Add prn Labetalol   - Continue current scheduled antihypertensives - add Norvasc  -   - metoprolol 25TID  - Sternal precautions    Heme:  - Hgb/Hct 8.2/24.5  - plts 274  - Continue to trend    ID:  - Routine post-operative Abx - Ancef x 5 doses    Renal:  - Nguyen in place  - Strict I/Os  - BUN/Cr 29/1.3  - Lasix 40 BID  - D/c Lasix gtt    FEN/GI:  - zofran for nausea  - cardiac diet  - Replace lytes PRN  - reglan    Endo:  - Endocrine following, appreciate assistance  - Accuchecks  - Insulin gtt    Dispo:  - stepdown when stable for transfer to the floor    Cory Matson MD  General Surgery PGY-2  Pager: [phone]

## 2017-07-24 NOTE — PROGRESS NOTES
"[facility]  Endocrinology  Progress Note    Admit Date: 7/19/2017     Reason for Consult: Management of Hyperglycemia     Surgical Procedure and Date:  7/21/17  REPLACEMENT-VALVE-AORTIC   REPAIR VALVE-MITRAL   REPAIR AORTIC ROOT   PLACEMENT-NEOPERICARDIUM      HPI:   Patient is a 26 y.o. male  with pmh of IV drug use, who presents from [facility] with a diagnosis of endocarditis. Pt. Previously healthy individual who began experiencing symptoms ~2 months ago, including chest pain, hemoptysis and worsening ROBERTS. Now S/p AVR, MVR, and aortic root repair.   No previous history of diabetes or prediabetes listed in EPIC.   Endocrine consulted for optimal BG management.            Interval HPI:   No acute events overnight. Reports he is tolerating PO, but eating ~50% of meals. Afebrile, no nausea, no hypoglycemia. BG at goal with no current insulin requirements.     BP (!) 107/53 (BP Location: Right arm, Patient Position: Sitting, BP Method: Automatic)   Pulse 103   Temp 98.5 °F (36.9 °C) (Oral)   Resp (!) 22   Ht 5' 8" (1.727 m)   Wt 68.2 kg (150 lb 5.7 oz)   SpO2 (!) 93%   BMI 22.86 kg/m²      Labs Reviewed and Include      Recent Labs  Lab 07/24/17  0448 07/24/17  0758   *  --    CALCIUM 8.7  --    *  --    K 3.6  3.6 4.2   CO2 29  --    CL 86*  --    BUN 29*  --    CREATININE 1.3  --      Lab Results   Component Value Date    WBC 12.00 07/24/2017    HGB 8.2 (L) 07/24/2017    HCT 24.5 (L) 07/24/2017    MCV 82 07/24/2017     07/24/2017     No results for input(s): TSH, FREET4 in the last 168 hours.  Lab Results   Component Value Date    HGBA1C 4.9 07/22/2017       Nutritional status:   Body mass index is 22.86 kg/m².  Lab Results   Component Value Date    ALBUMIN 2.9 (L) 07/20/2017    ALBUMIN 2.9 (L) 07/20/2017    ALBUMIN 4.7 03/23/2015     No results found for: PREALBUMIN    Estimated Creatinine Clearance: 83.8 mL/min (based on Cr of 1.3).    Accu-Checks  Recent Labs      " 07/22/17   1947  07/22/17   2104  07/22/17   2209  07/22/17   2250  07/23/17   0016  07/23/17   0110  07/23/17   0220  07/23/17   0334  07/23/17   0510  07/23/17   0958   POCTGLUCOSE  145*  139*  125*  131*  144*  121*  124*  137*  128*  160*       Current Medications and/or Treatments Impacting Glycemic Control  Immunotherapy:  Immunosuppressants     None        Steroids:   Hormones     None        Pressors:    Autonomic Drugs     None        Hyperglycemia/Diabetes Medications: Antihyperglycemics     None          ASSESSMENT and PLAN    Stress hyperglycemia    BG goal 140-180 since now eating. Continue low dose correction scale, monitor AC/HS only until tonight. Will sign off. Thank you for this consult. Reconsult if needed.     A1c 4.9.    Discharge Planning: No glycemic medications or Endocrine f/u needed          * S/P AVR (aortic valve replacement)    Per CTS. Avoid hypo/hyperglycemia           S/P MVR (mitral valve repair)    Per CTS. Avoid hypo/hyperglycemia.           Endocarditis    Now s/p MVR and AVR  Avoid hypo/hyperglycemia.            Vasile Lyons, DNP, NP  Endocrinology  [facility]

## 2017-07-24 NOTE — SUBJECTIVE & OBJECTIVE
"Interval HPI:   No acute events overnight. Reports he is tolerating PO, but eating ~50% of meals. Afebrile, no nausea, no hypoglycemia. BG at goal with no current insulin requirements.     BP (!) 107/53 (BP Location: Right arm, Patient Position: Sitting, BP Method: Automatic)   Pulse 103   Temp 98.5 °F (36.9 °C) (Oral)   Resp (!) 22   Ht 5' 8" (1.727 m)   Wt 68.2 kg (150 lb 5.7 oz)   SpO2 (!) 93%   BMI 22.86 kg/m²     Labs Reviewed and Include      Recent Labs  Lab 07/24/17  0448 07/24/17  0758   *  --    CALCIUM 8.7  --    *  --    K 3.6  3.6 4.2   CO2 29  --    CL 86*  --    BUN 29*  --    CREATININE 1.3  --      Lab Results   Component Value Date    WBC 12.00 07/24/2017    HGB 8.2 (L) 07/24/2017    HCT 24.5 (L) 07/24/2017    MCV 82 07/24/2017     07/24/2017     No results for input(s): TSH, FREET4 in the last 168 hours.  Lab Results   Component Value Date    HGBA1C 4.9 07/22/2017       Nutritional status:   Body mass index is 22.86 kg/m².  Lab Results   Component Value Date    ALBUMIN 2.9 (L) 07/20/2017    ALBUMIN 2.9 (L) 07/20/2017    ALBUMIN 4.7 03/23/2015     No results found for: PREALBUMIN    Estimated Creatinine Clearance: 83.8 mL/min (based on Cr of 1.3).    Accu-Checks  Recent Labs      07/22/17   1947  07/22/17   2104  07/22/17   2209  07/22/17   2250  07/23/17   0016  07/23/17   0110  07/23/17   0220  07/23/17   0334  07/23/17   0510  07/23/17   0958   POCTGLUCOSE  145*  139*  125*  131*  144*  121*  124*  137*  128*  160*       Current Medications and/or Treatments Impacting Glycemic Control  Immunotherapy:  Immunosuppressants     None        Steroids:   Hormones     None        Pressors:    Autonomic Drugs     None        Hyperglycemia/Diabetes Medications: Antihyperglycemics     None        "

## 2017-07-24 NOTE — PLAN OF CARE
"Problem: Spiritual Distress, Risk/Actual (Adult,Obstetrics,Pediatric)  Intervention: Facilitate Personal Exploration/Expression of Spirituality  F - Latoya and Belief: Pt shared going through his recent diagnosis and surgery was, "an eye opener," and made him re-evaluate what is important in life. "This is my last chance," he said, referring to this new opportunity at life post-surgery.      I - Importance: Pt is re-evaluating what is important, including his spiritual life.     C - Community:  Pt's step-mom present. Pt mentioned multiple family members have illnesses.     A - Address in Care: Introduced and offered pastoral support. Pt requested follow-up  visits for prayer and encouragement.  will continue to follow. Pt also was made aware of 's presence as needed.           "

## 2017-07-24 NOTE — PROGRESS NOTES
Progress Note  Surgical Intensive Care    Admit Date: 7/19/2017  Post-operative Day: 3 Days Post-Op  Hospital Day: 6    SUBJECTIVE:     Follow-up For:  Procedure(s) (LRB):  REPLACEMENT-VALVE-AORTIC (N/A)  REPAIR VALVE-MITRAL (N/A)  REPAIR AORTIC ROOT (N/A)  PLACEMENT-NEOPERICARDIUM (N/A)    HPI: Patient is a 26 y.o. male  transferred from [facility] for surgical evaluation of infective endocarditis. He has been on micafungin, Vanc, Gentamicin, and Rocephin abx therapy. He had a DOUG at the [facility] but is not in his chart. He was taken to the OR today by CTS. He had replacement of the Aortic valve, repair of the mitral valve and aortic root, and placement of neopericardium.     He presents to the SICU sedated and intubated on 70% and PEEP of 5, requiring 0.06 of levo, 0.08 of epi, and 0.04 of vaso.     Interval history: Extubated Back on cardene overnight. PRNs ineffective.     Continuous Infusions:   furosemide (LASIX) 1 mg/mL infusion (non-titrating) 5 mg/hr (07/24/17 0600)    hydromorphone in 0.9 % NaCl 6 mg/30 ml      nicardipine 10 mg/hr (07/24/17 0606)     Scheduled Meds:   acetaminophen  1,000 mg Intravenous Q8H    aspirin  325 mg Oral Once    aspirin  325 mg Oral Daily    cefTRIAXone (ROCEPHIN) IVPB  2 g Intravenous Q24H    famotidine (PF)  20 mg Intravenous BID    furosemide  40 mg Intravenous BID    hydrALAZINE  25 mg Oral Q8H    metoclopramide HCl  10 mg Oral TID AC    metoprolol tartrate  12.5 mg Oral TID    mupirocin  1 g Nasal BID    nicotine  1 patch Transdermal Daily    ondansetron  4 mg Intravenous Q6H    polyethylene glycol  17 g Oral Daily    sodium chloride 0.9%  3 mL Intravenous Q8H     PRN Meds:albumin human 5%, albuterol-ipratropium 2.5mg-0.5mg/3mL, [COMPLETED] calcium gluconate IVPB **AND** calcium gluconate IVPB, dextrose 50%, dextrose 50%, hydrALAZINE, ketorolac, lactulose, magnesium sulfate IVPB, metoclopramide HCl, naloxone, ondansetron, pneumoc 13-edith conj-dip cr(PF), potassium  chloride **AND** potassium chloride **AND** potassium chloride, sodium phosphate IVPB, sodium phosphate IVPB, sodium phosphate IVPB    Review of patient's allergies indicates:  No Known Allergies    OBJECTIVE:     Vital Signs (Most Recent)  Temp: 98.5 °F (36.9 °C) (07/24/17 0230)  Pulse: 93 (07/24/17 0630)  Resp: 15 (07/24/17 0630)  BP: (!) 120/59 (07/24/17 0630)  SpO2: 98 % (07/24/17 0630)    Vital Signs Range (Last 24H):  Temp:  [98.5 °F (36.9 °C)-98.9 °F (37.2 °C)]   Pulse:  []   Resp:  [11-32]   BP: (104-138)/(57-77)   SpO2:  [88 %-100 %]   Arterial Line BP: ()/(56-88)     I & O (Last 24H):  Intake/Output Summary (Last 24 hours) at 07/24/17 0646  Last data filed at 07/24/17 0600   Gross per 24 hour   Intake          2013.25 ml   Output             7970 ml   Net         -5956.75 ml     Ventilator Data (Last 24H):     Oxygen Concentration (%):  [40] 40  Resp Rate Total:  [24 br/min] 24 br/min    Hemodynamic Parameters (Last 24H):       Physical Exam:   General: well developed, well nourished  HENT: Head:normocephalic, atraumatic. Eyes: conjunctivae/corneas clear. PERRL.   Neck: supple, symmetrical, trachea midline, no JVD  Lungs:  Decreased breath sounds to bases bilat   Cardiovascular: Heart: regular rate and rhythm, S1, S2 normal, no murmur, click, rub or gallop. Chest Wall: sternotomy dress CDI. Extremities: mild edema to UE bilat. Pulses: 2+ and symmetric.  Abdomen/Rectal: Abdomen: soft, non-tender non-distented; bowel sounds normal; no masses,  no organomegaly.   Skin: Skin color, texture, turgor normal. No rashes or lesions    Wound/Incision:  clean, dry, intact    Lines/Drains:       Percutaneous Central Line Insertion/Assessment - double lumen  07/21/17 1600 right internal jugular (Active)   Dressing biopatch in place;dressing dry and intact;dressing changed 7/24/2017  3:00 AM   Securement secured w/ sutures 7/24/2017  3:00 AM   Additional Site Signs no erythema;no warmth;no edema;no pain;no  "palpable cord;no streak formation;no drainage 7/24/2017  3:00 AM   Distal Patency/Care infusing 7/24/2017  3:00 AM   Proximal Patency/Care infusing 7/24/2017  3:00 AM   Waveform normal 7/24/2017  3:00 AM   Line Interventions line leveled/zeroed 7/23/2017  3:00 PM   Dressing Change Due 07/31/17 7/24/2017  3:00 AM   Daily Line Review Performed 7/24/2017  3:00 AM   Number of days: 2            Peripheral IV - Single Lumen 07/19/17 0020 Left Forearm (Active)   Site Assessment Clean;Dry;Intact;No redness;No swelling 7/24/2017  3:00 AM   Line Status Infusing 7/24/2017  3:00 AM   Dressing Status Clean;Dry;Intact 7/24/2017  3:00 AM   Dressing Intervention Dressing reinforced 7/24/2017  3:00 AM   Dressing Change Due 07/27/17 7/24/2017  3:00 AM   Site Change Due 07/23/17 7/23/2017  7:00 AM   Reason Not Rotated Patient refused 7/24/2017  3:00 AM   Number of days: 5            Peripheral IV - Single Lumen 07/21/17 0956 Right Antecubital (Active)   Site Assessment Clean;Dry;Intact;No redness;No swelling 7/24/2017  3:00 AM   Line Status Infusing 7/24/2017  3:00 AM   Dressing Status Clean;Dry;Intact 7/24/2017  3:00 AM   Dressing Intervention Dressing reinforced 7/23/2017  3:00 PM   Dressing Change Due 07/25/17 7/23/2017  3:00 PM   Site Change Due 07/25/17 7/23/2017  7:00 AM   Reason Not Rotated Not due 7/24/2017  3:00 AM   Number of days: 2            Urethral Catheter 07/21/17 1015 Non-latex;Straight-tip;Temperature probe 16 Fr. (Active)   Site Assessment Clean;Intact 7/24/2017  3:00 AM   Collection Container Urimeter 7/24/2017  3:00 AM   Securement Method secured to top of thigh w/ adhesive device 7/24/2017  3:00 AM   Catheter Care Performed yes 7/24/2017  3:00 AM   Reason for Continuing Urinary Catheterization Critically ill in ICU requiring intensive monitoring 7/24/2017  3:00 AM   CAUTI Prevention Bundle StatLock in place w 1" slack;Intact seal between catheter & drainage tubing;Drainage bag off the floor;Green sheeting clip " in use;No dependent loops or kinks;Drainage bag not overfilled (<2/3 full);Drainage bag below bladder 7/23/2017  7:01 PM   Output (mL) 200 mL 7/24/2017  6:00 AM   Number of days: 2            Y Chest Tube 1 and 2 07/21/17 1432 1 Mediastinal 19 Fr. 2 Mediastinal 19 Fr. (Active)   Function -20 cm H2O 7/24/2017  3:00 AM   Air Leak/Fluctuation air leak not present;fluctuation not present 7/24/2017  3:00 AM   Safety all tubing connections taped;2 rubber-tipped hemostats w/ patient;all connections secured;suction checked 7/24/2017  3:00 AM   Securement tubing anchored to body distal to insertion site w/ tape 7/24/2017  3:00 AM   Left Subcutaneous Emphysema none present 7/24/2017  3:00 AM   Right Subcutaneous Emphysema none present 7/24/2017  3:00 AM   Patency Intervention Stripped;Tip/tilt 7/24/2017  3:00 AM   Drainage Description 1 Serosanguineous 7/24/2017  3:00 AM   Tube 1 Dressing Appearance occlusive gauze dressing intact;clean and dry 7/24/2017  3:00 AM   Tube 1 Dressing Care dressing reinforced 7/23/2017  3:00 PM   Site Assessment 1 Not assessed 7/24/2017  3:00 AM   Surrounding Skin 1 Unable to view 7/24/2017  3:00 AM   Drainage Description 2 Serosanguineous 7/24/2017  3:00 AM   Tube 2 Dressing Appearance occlusive gauze dressing intact;clean and dry 7/24/2017  3:00 AM   Tube 2 Dressing Care dressing reinforced 7/23/2017  3:00 PM   Site Assessment 2 Not assessed 7/24/2017  3:00 AM   Surrounding Skin Unable to view 7/24/2017  3:00 AM   Output (mL) 20 mL 7/24/2017  6:00 AM   Number of days: 2       Laboratory (Last 24H):  CBC:    Recent Labs  Lab 07/24/17 0448   WBC 12.00   HGB 8.2*   HCT 24.5*        CMP:    Recent Labs  Lab 07/24/17 0448   CALCIUM 8.7   *   K 3.6  3.6   CO2 29   CL 86*   BUN 29*   CREATININE 1.3         ASSESSMENT/PLAN:         Plan:     Neuro:   - awake, alert, no sedation      Pulmonary:   -satting well on RA  - no pulmonary issues at baseline  - Clear CXR      Recent Labs  Lab  07/21/17  1609   PH 7.311*   PCO2 46.7*   PO2 236*   HCO3 23.6*   POCSATURATED 100   BE -3      Meds chest tube - minimal output. 20cc/ last 4h     Cardiac:  - on cardene 10mg/h  - lasix gtt at 5   - lactate normalized      Renal:   -Nguyen in place  - Na 128  - K 3.6   -Bun/Cr  29/1.3, baseline around 1.1  -UOP 0.7 cc/kg/hr over the last 12     Fluids/Electrolytes/Nutrition/GI:   - cardiac diet      Hematology/Oncology:  -H/H, 8.2/24.5 from 11/33, continue to monitor closely, stable over last 3 days   -INR/Plts 1.2/220     Infectious Disease:   - afebrile  -Infectious endocarditis  -WBC trended down to 12 today   -rocephin 2g q12  -Cultures NGTD     Endocrine:  -Glucose range, goal of glu 120-150, currently 113  - off insulin gtt     Pain management:   Dilaudid PCA      Dispo:  -likely stepdown today     Margaret Spaulding CA2  SICU

## 2017-07-24 NOTE — SUBJECTIVE & OBJECTIVE
Interval History: Denies chest pain today. Continues to have productive cough without hemoptysis. Pain at surgical sites. Endorses some leg swelling.    Review of Systems   Constitutional: Negative for activity change, fever and unexpected weight change.   HENT: Negative for hearing loss and tinnitus.    Respiratory: Negative for choking, shortness of breath and wheezing.    Cardiovascular: Negative for chest pain and palpitations.        Pain with coughing   Gastrointestinal: Negative for abdominal pain and blood in stool.   Endocrine: Negative.    Genitourinary: Negative.    Neurological: Negative.      Objective:     Vital Signs (Most Recent):  Temp: 98.6 °F (37 °C) (07/24/17 1500)  Pulse: 83 (07/24/17 1700)  Resp: (!) 24 (07/24/17 1700)  BP: 123/69 (07/24/17 1700)  SpO2: 98 % (07/24/17 1700) Vital Signs (24h Range):  Temp:  [98.5 °F (36.9 °C)-98.9 °F (37.2 °C)] 98.6 °F (37 °C)  Pulse:  [] 83  Resp:  [11-32] 24  SpO2:  [88 %-100 %] 98 %  BP: (107-138)/(53-77) 123/69     Weight: 68.2 kg (150 lb 5.7 oz)  Body mass index is 22.86 kg/m².    Estimated Creatinine Clearance: 83.8 mL/min (based on Cr of 1.3).    Physical Exam   Constitutional: He is oriented to person, place, and time. He appears well-developed and well-nourished.   HENT:   Head: Normocephalic and atraumatic.   Eyes: Pupils are equal, round, and reactive to light.   Cardiovascular: Normal rate and regular rhythm.    No murmur heard.  Murmur present although softer from previous exams   Pulmonary/Chest: Effort normal and breath sounds normal. He has no wheezes. He has no rales.   Abdominal: Soft. Bowel sounds are normal. He exhibits no distension. There is no tenderness.   Musculoskeletal: He exhibits edema (lower & upper extremity swelling).   Neurological: He is alert and oriented to person, place, and time.   Skin: Skin is warm and dry.   Psychiatric: He has a normal mood and affect. His behavior is normal.     Significant Labs:   CBC:     Recent  Labs  Lab 07/23/17 0334 07/24/17 0448   WBC 16.03* 12.00   HGB 8.4* 8.2*   HCT 25.3* 24.5*    274     CMP:   Recent Labs  Lab 07/23/17 0334 07/23/17  1940 07/24/17  0030 07/24/17 0448 07/24/17  0758   *  --  127*  --  128*  --    K 5.7*  5.7*  < > 3.8  3.8 3.8 3.6  3.6 4.2   CL 94*  --  90*  --  86*  --    CO2 20*  --  24  --  29  --      --  114*  --  113*  --    BUN 30*  --  29*  --  29*  --    CREATININE 1.4  --  1.3  --  1.3  --    CALCIUM 8.0*  --  8.2*  --  8.7  --    ANIONGAP 10  --  13  --  13  --    EGFRNONAA >60.0  --  >60.0  --  >60.0  --    < > = values in this interval not displayed.  Significant Imaging: I have reviewed all pertinent imaging results/findings within the past 24 hours.

## 2017-07-25 LAB
ALBUMIN SERPL BCP-MCNC: 2.5 G/DL
ALP SERPL-CCNC: 63 U/L
ALT SERPL W/O P-5'-P-CCNC: 77 U/L
ANION GAP SERPL CALC-SCNC: 11 MMOL/L
ANION GAP SERPL CALC-SCNC: 11 MMOL/L
ANION GAP SERPL CALC-SCNC: 12 MMOL/L
ANISOCYTOSIS BLD QL SMEAR: SLIGHT
AST SERPL-CCNC: 132 U/L
BACTERIA BLD CULT: NORMAL
BACTERIA BLD CULT: NORMAL
BASOPHILS # BLD AUTO: 0.01 K/UL
BASOPHILS NFR BLD: 0.1 %
BILIRUB SERPL-MCNC: 0.4 MG/DL
BLD PROD TYP BPU: NORMAL
BLOOD UNIT EXPIRATION DATE: NORMAL
BLOOD UNIT TYPE CODE: 5100
BLOOD UNIT TYPE: NORMAL
BUN SERPL-MCNC: 23 MG/DL
BUN SERPL-MCNC: 23 MG/DL
BUN SERPL-MCNC: 32 MG/DL
CALCIUM SERPL-MCNC: 8.3 MG/DL
CALCIUM SERPL-MCNC: 8.6 MG/DL
CALCIUM SERPL-MCNC: 9 MG/DL
CHLORIDE SERPL-SCNC: 83 MMOL/L
CHLORIDE SERPL-SCNC: 84 MMOL/L
CHLORIDE SERPL-SCNC: 85 MMOL/L
CO2 SERPL-SCNC: 33 MMOL/L
CO2 SERPL-SCNC: 33 MMOL/L
CO2 SERPL-SCNC: 34 MMOL/L
CODING SYSTEM: NORMAL
CREAT SERPL-MCNC: 1.1 MG/DL
CREAT SERPL-MCNC: 1.1 MG/DL
CREAT SERPL-MCNC: 1.2 MG/DL
DIFFERENTIAL METHOD: ABNORMAL
DISPENSE STATUS: NORMAL
EOSINOPHIL # BLD AUTO: 0.1 K/UL
EOSINOPHIL NFR BLD: 0.9 %
ERYTHROCYTE [DISTWIDTH] IN BLOOD BY AUTOMATED COUNT: 15.8 %
EST. GFR  (AFRICAN AMERICAN): >60 ML/MIN/1.73 M^2
EST. GFR  (NON AFRICAN AMERICAN): >60 ML/MIN/1.73 M^2
GLUCOSE SERPL-MCNC: 115 MG/DL
GLUCOSE SERPL-MCNC: 122 MG/DL
GLUCOSE SERPL-MCNC: 97 MG/DL
HCT VFR BLD AUTO: 23.1 %
HGB BLD-MCNC: 7.7 G/DL
HYPOCHROMIA BLD QL SMEAR: ABNORMAL
LYMPHOCYTES # BLD AUTO: 1.3 K/UL
LYMPHOCYTES NFR BLD: 17.6 %
MAGNESIUM SERPL-MCNC: 2 MG/DL
MCH RBC QN AUTO: 27 PG
MCHC RBC AUTO-ENTMCNC: 33.3 G/DL
MCV RBC AUTO: 81 FL
MONOCYTES # BLD AUTO: 0.9 K/UL
MONOCYTES NFR BLD: 11.3 %
NEUTROPHILS # BLD AUTO: 5.3 K/UL
NEUTROPHILS NFR BLD: 70.1 %
NUM UNITS TRANS FFP: NORMAL
PHOSPHATE SERPL-MCNC: 3.6 MG/DL
PLATELET # BLD AUTO: 217 K/UL
PLATELET BLD QL SMEAR: ABNORMAL
PMV BLD AUTO: 8.6 FL
POCT GLUCOSE: 123 MG/DL (ref 70–110)
POLYCHROMASIA BLD QL SMEAR: ABNORMAL
POTASSIUM SERPL-SCNC: 2.7 MMOL/L
POTASSIUM SERPL-SCNC: 3.3 MMOL/L
PROT SERPL-MCNC: 5.8 G/DL
RBC # BLD AUTO: 2.85 M/UL
SODIUM SERPL-SCNC: 128 MMOL/L
SODIUM SERPL-SCNC: 128 MMOL/L
SODIUM SERPL-SCNC: 130 MMOL/L
TRANS ERYTHROCYTES VOL PATIENT: NORMAL ML
WBC # BLD AUTO: 7.6 K/UL

## 2017-07-25 PROCEDURE — 80048 BASIC METABOLIC PNL TOTAL CA: CPT

## 2017-07-25 PROCEDURE — 80053 COMPREHEN METABOLIC PANEL: CPT

## 2017-07-25 PROCEDURE — 20600001 HC STEP DOWN PRIVATE ROOM

## 2017-07-25 PROCEDURE — 94761 N-INVAS EAR/PLS OXIMETRY MLT: CPT

## 2017-07-25 PROCEDURE — 25000003 PHARM REV CODE 250: Performed by: THORACIC SURGERY (CARDIOTHORACIC VASCULAR SURGERY)

## 2017-07-25 PROCEDURE — 84100 ASSAY OF PHOSPHORUS: CPT

## 2017-07-25 PROCEDURE — 25000003 PHARM REV CODE 250

## 2017-07-25 PROCEDURE — A4216 STERILE WATER/SALINE, 10 ML: HCPCS | Performed by: THORACIC SURGERY (CARDIOTHORACIC VASCULAR SURGERY)

## 2017-07-25 PROCEDURE — 83735 ASSAY OF MAGNESIUM: CPT

## 2017-07-25 PROCEDURE — 25000003 PHARM REV CODE 250: Performed by: STUDENT IN AN ORGANIZED HEALTH CARE EDUCATION/TRAINING PROGRAM

## 2017-07-25 PROCEDURE — 80048 BASIC METABOLIC PNL TOTAL CA: CPT | Mod: 91

## 2017-07-25 PROCEDURE — 63600175 PHARM REV CODE 636 W HCPCS: Performed by: STUDENT IN AN ORGANIZED HEALTH CARE EDUCATION/TRAINING PROGRAM

## 2017-07-25 PROCEDURE — 63600175 PHARM REV CODE 636 W HCPCS: Performed by: THORACIC SURGERY (CARDIOTHORACIC VASCULAR SURGERY)

## 2017-07-25 PROCEDURE — 99232 SBSQ HOSP IP/OBS MODERATE 35: CPT | Mod: ,,, | Performed by: ANESTHESIOLOGY

## 2017-07-25 PROCEDURE — 85025 COMPLETE CBC W/AUTO DIFF WBC: CPT

## 2017-07-25 RX ORDER — POTASSIUM CHLORIDE 29.8 MG/ML
40 INJECTION INTRAVENOUS ONCE
Status: COMPLETED | OUTPATIENT
Start: 2017-07-25 | End: 2017-07-25

## 2017-07-25 RX ORDER — NICARDIPINE HYDROCHLORIDE 0.2 MG/ML
1 INJECTION INTRAVENOUS CONTINUOUS
Status: DISCONTINUED | OUTPATIENT
Start: 2017-07-25 | End: 2017-07-25

## 2017-07-25 RX ORDER — OXYCODONE HYDROCHLORIDE 5 MG/1
15 TABLET ORAL
Status: DISCONTINUED | OUTPATIENT
Start: 2017-07-25 | End: 2017-07-27

## 2017-07-25 RX ORDER — LABETALOL HYDROCHLORIDE 5 MG/ML
20 INJECTION, SOLUTION INTRAVENOUS EVERY 4 HOURS PRN
Status: DISCONTINUED | OUTPATIENT
Start: 2017-07-25 | End: 2017-07-27

## 2017-07-25 RX ORDER — HYDRALAZINE HYDROCHLORIDE 50 MG/1
50 TABLET, FILM COATED ORAL EVERY 8 HOURS
Status: DISCONTINUED | OUTPATIENT
Start: 2017-07-25 | End: 2017-07-27

## 2017-07-25 RX ORDER — HYDROMORPHONE HYDROCHLORIDE 1 MG/ML
0.5 INJECTION, SOLUTION INTRAMUSCULAR; INTRAVENOUS; SUBCUTANEOUS
Status: DISCONTINUED | OUTPATIENT
Start: 2017-07-25 | End: 2017-07-27

## 2017-07-25 RX ORDER — OXYCODONE HYDROCHLORIDE 5 MG/1
10 TABLET ORAL
Status: DISCONTINUED | OUTPATIENT
Start: 2017-07-25 | End: 2017-07-27

## 2017-07-25 RX ORDER — POTASSIUM CHLORIDE 20 MEQ/1
60 TABLET, EXTENDED RELEASE ORAL ONCE
Status: COMPLETED | OUTPATIENT
Start: 2017-07-25 | End: 2017-07-25

## 2017-07-25 RX ORDER — NICARDIPINE HYDROCHLORIDE 0.2 MG/ML
INJECTION INTRAVENOUS
Status: COMPLETED
Start: 2017-07-25 | End: 2017-07-25

## 2017-07-25 RX ADMIN — OXYCODONE HYDROCHLORIDE 10 MG: 5 TABLET ORAL at 07:07

## 2017-07-25 RX ADMIN — HYDRALAZINE HYDROCHLORIDE 50 MG: 50 TABLET ORAL at 02:07

## 2017-07-25 RX ADMIN — LABETALOL HYDROCHLORIDE 10 MG: 5 INJECTION, SOLUTION INTRAVENOUS at 05:07

## 2017-07-25 RX ADMIN — POTASSIUM CHLORIDE 60 MEQ: 1500 TABLET, EXTENDED RELEASE ORAL at 06:07

## 2017-07-25 RX ADMIN — CEFTRIAXONE 2 G: 2 INJECTION, SOLUTION INTRAVENOUS at 09:07

## 2017-07-25 RX ADMIN — ASPIRIN 325 MG ORAL TABLET 325 MG: 325 PILL ORAL at 09:07

## 2017-07-25 RX ADMIN — METOPROLOL TARTRATE 25 MG: 25 TABLET ORAL at 05:07

## 2017-07-25 RX ADMIN — HYDRALAZINE HYDROCHLORIDE 50 MG: 50 TABLET ORAL at 05:07

## 2017-07-25 RX ADMIN — OXYCODONE HYDROCHLORIDE 15 MG: 5 TABLET ORAL at 09:07

## 2017-07-25 RX ADMIN — HYDROMORPHONE HYDROCHLORIDE 0.5 MG: 1 INJECTION, SOLUTION INTRAMUSCULAR; INTRAVENOUS; SUBCUTANEOUS at 11:07

## 2017-07-25 RX ADMIN — POTASSIUM CHLORIDE 60 MEQ: 200 INJECTION, SOLUTION INTRAVENOUS at 04:07

## 2017-07-25 RX ADMIN — NICARDIPINE HYDROCHLORIDE 2.5 MG/HR: 0.2 INJECTION, SOLUTION INTRAVENOUS at 02:07

## 2017-07-25 RX ADMIN — HYDRALAZINE HYDROCHLORIDE 50 MG: 50 TABLET ORAL at 09:07

## 2017-07-25 RX ADMIN — LABETALOL HYDROCHLORIDE 10 MG: 5 INJECTION, SOLUTION INTRAVENOUS at 12:07

## 2017-07-25 RX ADMIN — METOPROLOL TARTRATE 25 MG: 25 TABLET ORAL at 09:07

## 2017-07-25 RX ADMIN — METOPROLOL TARTRATE 25 MG: 25 TABLET ORAL at 02:07

## 2017-07-25 RX ADMIN — NICARDIPINE HYDROCHLORIDE 2.5 MG/HR: 0.2 INJECTION INTRAVENOUS at 02:07

## 2017-07-25 RX ADMIN — POTASSIUM CHLORIDE 40 MEQ: 400 INJECTION, SOLUTION INTRAVENOUS at 06:07

## 2017-07-25 RX ADMIN — HYDRALAZINE HYDROCHLORIDE 25 MG: 25 TABLET, FILM COATED ORAL at 12:07

## 2017-07-25 RX ADMIN — OXYCODONE HYDROCHLORIDE 15 MG: 5 TABLET ORAL at 05:07

## 2017-07-25 RX ADMIN — OXYCODONE HYDROCHLORIDE 15 MG: 5 TABLET ORAL at 11:07

## 2017-07-25 RX ADMIN — Medication 3 ML: at 05:07

## 2017-07-25 RX ADMIN — FUROSEMIDE 40 MG: 10 INJECTION, SOLUTION INTRAVENOUS at 05:07

## 2017-07-25 RX ADMIN — AMLODIPINE BESYLATE 5 MG: 5 TABLET ORAL at 09:07

## 2017-07-25 RX ADMIN — OXYCODONE HYDROCHLORIDE 15 MG: 5 TABLET ORAL at 02:07

## 2017-07-25 RX ADMIN — FUROSEMIDE 40 MG: 10 INJECTION, SOLUTION INTRAVENOUS at 09:07

## 2017-07-25 NOTE — PROGRESS NOTES
When nurse entered room patient was drinking mountain dew and tobacco dip. Nurse educated patient on effects of caffeine and nicotine on pressure and heart rate. Patient expressed understanding and spat dip out. Dr. Zuniga made aware.

## 2017-07-25 NOTE — PLAN OF CARE
Problem: Patient Care Overview  Goal: Plan of Care Review  Outcome: Ongoing (interventions implemented as appropriate)  Blood pressures became elevated this shift. Cardene gtt reinitiated to maintain MAP<90. Patient continues to be noncompliant with diet and tobacco use.

## 2017-07-25 NOTE — PROGRESS NOTES
Pt arrived on unit via wheelchair.  Telemetry room notified.  VSS.  Pt on RA with RIJ infusing.  Meds and chart sent with pt.  Family at the bedside.  Pt and family oriented to room and unit. Bed in lowest position with siderails up x2.  Call bell within reach; pt instructed to call for assistance.

## 2017-07-25 NOTE — PROGRESS NOTES
Spoke with Dr. Zuniga regarding high BP which was not brought down with PRN hydralazine or labetolol. Ordered to restart Cardene. Will continue to monitor.

## 2017-07-25 NOTE — PROGRESS NOTES
Progress Note  Surgical Intensive Care    Admit Date: 7/19/2017  Post-operative Day: 4 Days Post-Op  Hospital Day: 7    SUBJECTIVE:     Follow-up For:  Procedure(s) (LRB):  REPLACEMENT-VALVE-AORTIC (N/A)  REPAIR VALVE-MITRAL (N/A)  REPAIR AORTIC ROOT (N/A)  PLACEMENT-NEOPERICARDIUM (N/A)    HPI: Patient is a 26 y.o. male  transferred from [facility] for surgical evaluation of infective endocarditis. He has been on micafungin, Vanc, Gentamicin, and Rocephin abx therapy. He had a DOUG at the [facility] but is not in his chart. He was taken to the OR today by CTS. He had replacement of the Aortic valve, repair of the mitral valve and aortic root, and placement of neopericardium.       Interval history:  Doing well. On a touch of cardene overnight, and this AM, but currently off. Should go to floor today.     Continuous Infusions:   hydromorphone in 0.9 % NaCl 6 mg/30 ml      nicardipine Stopped (07/25/17 0600)     Scheduled Meds:   amlodipine  5 mg Oral Daily    aspirin  325 mg Oral Daily    cefTRIAXone (ROCEPHIN) IVPB  2 g Intravenous Q24H    famotidine (PF)  20 mg Intravenous BID    furosemide  40 mg Intravenous BID    hydrALAZINE  50 mg Oral Q8H    metoclopramide HCl  10 mg Oral TID AC    metoprolol tartrate  25 mg Oral TID    nicotine  1 patch Transdermal Daily    ondansetron  4 mg Intravenous Q6H    polyethylene glycol  17 g Oral Daily    sodium chloride 0.9%  3 mL Intravenous Q8H     PRN Meds:albumin human 5%, albuterol-ipratropium 2.5mg-0.5mg/3mL, [COMPLETED] calcium gluconate IVPB **AND** calcium gluconate IVPB, dextrose 50%, dextrose 50%, glucagon (human recombinant), glucose, glucose, hydrALAZINE, insulin aspart, ketorolac, labetalol, lactulose, magnesium sulfate IVPB, metoclopramide HCl, naloxone, ondansetron, pneumoc 13-edith conj-dip cr(PF), potassium chloride **AND** potassium chloride **AND** potassium chloride, sodium phosphate IVPB, sodium phosphate IVPB, sodium phosphate IVPB    Review of patient's  allergies indicates:  No Known Allergies    OBJECTIVE:     Vital Signs (Most Recent)  Temp: 98.8 °F (37.1 °C) (07/25/17 0300)  Pulse: 93 (07/25/17 0630)  Resp: 19 (07/25/17 0630)  BP: 121/61 (07/25/17 0630)  SpO2: 98 % (07/25/17 0630)    Vital Signs Range (Last 24H):  Temp:  [98.4 °F (36.9 °C)-99 °F (37.2 °C)]   Pulse:  [0-116]   Resp:  [12-31]   BP: (107-151)/(53-90)   SpO2:  [93 %-100 %]     I & O (Last 24H):    Intake/Output Summary (Last 24 hours) at 07/25/17 0650  Last data filed at 07/25/17 0600   Gross per 24 hour   Intake             2032 ml   Output             1945 ml   Net               87 ml     Ventilator Data (Last 24H):          Hemodynamic Parameters (Last 24H):       Physical Exam:   General: well developed, well nourished  HENT: Head:normocephalic, atraumatic. Eyes: conjunctivae/corneas clear. PERRL.   Neck: supple, symmetrical, trachea midline, no JVD  Lungs:  Decreased breath sounds to bases bilat   Cardiovascular: Heart: regular rate and rhythm, S1, S2 normal, no murmur, click, rub or gallop. Chest Wall: sternotomy dress CDI. Extremities: mild edema to UE bilat. Pulses: 2+ and symmetric.  Abdomen/Rectal: Abdomen: soft, non-tender non-distented; bowel sounds normal; no masses,  no organomegaly.   Skin: Skin color, texture, turgor normal. No rashes or lesions    Wound/Incision:  clean, dry, intact    Lines/Drains:       Percutaneous Central Line Insertion/Assessment - double lumen  07/21/17 1600 right internal jugular (Active)   Dressing biopatch in place;dressing dry and intact;dressing changed 7/24/2017  3:00 AM   Securement secured w/ sutures 7/24/2017  3:00 AM   Additional Site Signs no erythema;no warmth;no edema;no pain;no palpable cord;no streak formation;no drainage 7/24/2017  3:00 AM   Distal Patency/Care infusing 7/24/2017  3:00 AM   Proximal Patency/Care infusing 7/24/2017  3:00 AM   Waveform normal 7/24/2017  3:00 AM   Line Interventions line leveled/zeroed 7/23/2017  3:00 PM  "  Dressing Change Due 07/31/17 7/24/2017  3:00 AM   Daily Line Review Performed 7/24/2017  3:00 AM   Number of days: 2            Peripheral IV - Single Lumen 07/19/17 0020 Left Forearm (Active)   Site Assessment Clean;Dry;Intact;No redness;No swelling 7/24/2017  3:00 AM   Line Status Infusing 7/24/2017  3:00 AM   Dressing Status Clean;Dry;Intact 7/24/2017  3:00 AM   Dressing Intervention Dressing reinforced 7/24/2017  3:00 AM   Dressing Change Due 07/27/17 7/24/2017  3:00 AM   Site Change Due 07/23/17 7/23/2017  7:00 AM   Reason Not Rotated Patient refused 7/24/2017  3:00 AM   Number of days: 5            Peripheral IV - Single Lumen 07/21/17 0956 Right Antecubital (Active)   Site Assessment Clean;Dry;Intact;No redness;No swelling 7/24/2017  3:00 AM   Line Status Infusing 7/24/2017  3:00 AM   Dressing Status Clean;Dry;Intact 7/24/2017  3:00 AM   Dressing Intervention Dressing reinforced 7/23/2017  3:00 PM   Dressing Change Due 07/25/17 7/23/2017  3:00 PM   Site Change Due 07/25/17 7/23/2017  7:00 AM   Reason Not Rotated Not due 7/24/2017  3:00 AM   Number of days: 2            Urethral Catheter 07/21/17 1015 Non-latex;Straight-tip;Temperature probe 16 Fr. (Active)   Site Assessment Clean;Intact 7/24/2017  3:00 AM   Collection Container Urimeter 7/24/2017  3:00 AM   Securement Method secured to top of thigh w/ adhesive device 7/24/2017  3:00 AM   Catheter Care Performed yes 7/24/2017  3:00 AM   Reason for Continuing Urinary Catheterization Critically ill in ICU requiring intensive monitoring 7/24/2017  3:00 AM   CAUTI Prevention Bundle StatLock in place w 1" slack;Intact seal between catheter & drainage tubing;Drainage bag off the floor;Green sheeting clip in use;No dependent loops or kinks;Drainage bag not overfilled (<2/3 full);Drainage bag below bladder 7/23/2017  7:01 PM   Output (mL) 200 mL 7/24/2017  6:00 AM   Number of days: 2            Y Chest Tube 1 and 2 07/21/17 1432 1 Mediastinal 19 Fr. 2 Mediastinal 19 " Fr. (Active)   Function -20 cm H2O 7/24/2017  3:00 AM   Air Leak/Fluctuation air leak not present;fluctuation not present 7/24/2017  3:00 AM   Safety all tubing connections taped;2 rubber-tipped hemostats w/ patient;all connections secured;suction checked 7/24/2017  3:00 AM   Securement tubing anchored to body distal to insertion site w/ tape 7/24/2017  3:00 AM   Left Subcutaneous Emphysema none present 7/24/2017  3:00 AM   Right Subcutaneous Emphysema none present 7/24/2017  3:00 AM   Patency Intervention Stripped;Tip/tilt 7/24/2017  3:00 AM   Drainage Description 1 Serosanguineous 7/24/2017  3:00 AM   Tube 1 Dressing Appearance occlusive gauze dressing intact;clean and dry 7/24/2017  3:00 AM   Tube 1 Dressing Care dressing reinforced 7/23/2017  3:00 PM   Site Assessment 1 Not assessed 7/24/2017  3:00 AM   Surrounding Skin 1 Unable to view 7/24/2017  3:00 AM   Drainage Description 2 Serosanguineous 7/24/2017  3:00 AM   Tube 2 Dressing Appearance occlusive gauze dressing intact;clean and dry 7/24/2017  3:00 AM   Tube 2 Dressing Care dressing reinforced 7/23/2017  3:00 PM   Site Assessment 2 Not assessed 7/24/2017  3:00 AM   Surrounding Skin Unable to view 7/24/2017  3:00 AM   Output (mL) 20 mL 7/24/2017  6:00 AM   Number of days: 2       Laboratory (Last 24H):  CBC:    Recent Labs  Lab 07/25/17  0311   WBC 7.60   HGB 7.7*   HCT 23.1*        CMP:    Recent Labs  Lab 07/25/17  0311   CALCIUM 8.3*   ALBUMIN 2.5*   PROT 5.8*   *   K 2.7*  2.7*   CO2 33*   CL 84*   BUN 32*   CREATININE 1.2   ALKPHOS 63   ALT 77*   *   BILITOT 0.4         ASSESSMENT/PLAN:         Plan:     Neuro:   - awake, alert, no sedation      Pulmonary:   -satting well on RA  - no pulmonary issues at baseline  - Clear CXR      Recent Labs  Lab 07/21/17  1609   PH 7.311*   PCO2 46.7*   PO2 236*   HCO3 23.6*   POCSATURATED 100   BE -3         Cardiac:  - on cardene 5mg/h this AM, currently off  - consider increasing amlodipine   -  lasix gtt off    - lactate normalized      Renal:   - Na 128  - K 3.6 -->2.7  -Bun/Cr  32/1.2, baseline around 1.1  -UOP 1.1 cc/kg/h      Fluids/Electrolytes/Nutrition/GI:   - cardiac diet      Hematology/Oncology:  -H/H, 8.2/24.5 from 11/33, continue to monitor closely, stable over last 3 days   -INR/Plts 1.2/220     Infectious Disease:   - afebrile  -Infectious endocarditis  -WBC trended down to 12 today   -rocephin 2g q12 x 6 weeks   -Cultures NGTD     Endocrine:  -Glucose range, goal of glu 120-150  - off insulin gtt     Pain management:   Dilaudid PCA      Dispo:  -stepdown today     Margaret Spaulding CA2  SICU

## 2017-07-25 NOTE — PLAN OF CARE
The patient required some cardene for BP ctrl. Cardene now off. Plan to step pt down to the floor. D/C plan is home with family and IV abx.

## 2017-07-25 NOTE — PLAN OF CARE
The patient required some cardene for BP ctrl. Cardene now off. Plan to step pt down to the floor. D/C plan is home with family and IV abx.      07/25/17 0817   Discharge Reassessment   Assessment Type Discharge Planning Reassessment   Can the patient answer the patient profile reliably? Yes, cognitively intact   How does the patient rate their overall health at the present time? Fair   Describe the patient's ability to walk at the present time. No restrictions   How often would a person be available to care for the patient? Often   Discharge plan remains the same: Yes  (6 weeks IV abx)   Discharge Plan A Home with family   Discharge Plan B Home Health

## 2017-07-25 NOTE — PROGRESS NOTES
Progress Note  Cardiothoracic Surgery    Admit Date: 7/19/2017  Post-operative Day: 4 Days Post-Op  Hospital Day: 7    SUBJECTIVE:     Follow-up For: Procedure(s) (LRB):  REPLACEMENT-VALVE-AORTIC (N/A)  REPAIR VALVE-MITRAL (N/A)  REPAIR AORTIC ROOT (N/A)  PLACEMENT-NEOPERICARDIUM (N/A)     Interval History:  NAEON. Afebrile. Resting comfortably in bed this AM. Pain under better control. Started back on Cardene briefly o/n for HTN - off this AM. 1.7 L UOP over past 24h. Tolerating diet.     Scheduled Meds:   amlodipine  5 mg Oral Daily    aspirin  325 mg Oral Daily    cefTRIAXone (ROCEPHIN) IVPB  2 g Intravenous Q24H    famotidine (PF)  20 mg Intravenous BID    furosemide  40 mg Intravenous BID    hydrALAZINE  50 mg Oral Q8H    metoclopramide HCl  10 mg Oral TID AC    metoprolol tartrate  25 mg Oral TID    nicotine  1 patch Transdermal Daily    ondansetron  4 mg Intravenous Q6H    polyethylene glycol  17 g Oral Daily    sodium chloride 0.9%  3 mL Intravenous Q8H     Infusions/Drips:   hydromorphone in 0.9 % NaCl 6 mg/30 ml      nicardipine Stopped (07/25/17 0600)     PRN Meds: albumin human 5%, albuterol-ipratropium 2.5mg-0.5mg/3mL, [COMPLETED] calcium gluconate IVPB **AND** calcium gluconate IVPB, dextrose 50%, dextrose 50%, glucagon (human recombinant), glucose, glucose, hydrALAZINE, insulin aspart, ketorolac, labetalol, lactulose, magnesium sulfate IVPB, metoclopramide HCl, naloxone, ondansetron, pneumoc 13-edith conj-dip cr(PF), potassium chloride **AND** potassium chloride **AND** potassium chloride, sodium phosphate IVPB, sodium phosphate IVPB, sodium phosphate IVPB    Review of patient's allergies indicates:  No Known Allergies    OBJECTIVE:     Vital Signs (Most Recent)  Temp: 98.8 °F (37.1 °C) (07/25/17 0300)  Pulse: 93 (07/25/17 0630)  Resp: 19 (07/25/17 0630)  BP: 121/61 (07/25/17 0630)  SpO2: 98 % (07/25/17 0630)    Admission Weight: 68.3 kg (150 lb 9.2 oz) (07/20/17 0059)   Most Recent  Weight: 68.2 kg (150 lb 5.7 oz) (07/24/17 0500)    Vital Signs Range (Last 24H):  Temp:  [98.4 °F (36.9 °C)-99 °F (37.2 °C)]   Pulse:  [0-116]   Resp:  [12-31]   BP: (107-151)/(53-90)   SpO2:  [93 %-100 %]     I & O (Last 24H):    Intake/Output Summary (Last 24 hours) at 07/25/17 0725  Last data filed at 07/25/17 0600   Gross per 24 hour   Intake             1932 ml   Output             1770 ml   Net              162 ml     Physical Exam:  NC/AT, EOMI  RRR, surgical incision C/D/I, Chest tube in place >100 cc output  LCTAB  Abd soft, non-tender, non-distended    Laboratory:  CBC:     Recent Labs  Lab 07/25/17 0311   WBC 7.60   RBC 2.85*   HGB 7.7*   HCT 23.1*      MCV 81*   MCH 27.0   MCHC 33.3     BMP:     Recent Labs  Lab 07/25/17  0311   GLU 97   *   K 2.7*  2.7*   CL 84*   CO2 33*   BUN 32*   CREATININE 1.2   CALCIUM 8.3*   MG 2.0     CMP:     Recent Labs  Lab 07/25/17 0311   GLU 97   CALCIUM 8.3*   ALBUMIN 2.5*   PROT 5.8*   *   K 2.7*  2.7*   CO2 33*   CL 84*   BUN 32*   CREATININE 1.2   ALKPHOS 63   ALT 77*   *   BILITOT 0.4     LFTs:     Recent Labs  Lab 07/25/17  0311   ALT 77*   *   ALKPHOS 63   BILITOT 0.4   PROT 5.8*   ALBUMIN 2.5*     Coagulation:     Recent Labs  Lab 07/22/17  0300   LABPROT 11.0   INR 1.0   APTT 25.1     Cardiac markers: No results for input(s): CKMB, CPKMB, TROPONINT, TROPONINI, MYOGLOBIN in the last 168 hours.  ABGs:     Recent Labs  Lab 07/21/17  2315   PH 7.343*   PCO2 44.4   PO2 24*   HCO3 24.1   POCSATURATED 39*   BE -2       Diagnostic Results:  X-Ray: Reviewed    ASSESSMENT/PLAN:     27 y/o M s/p replacement of aortic valve, repair of mitral valve and aortic root, placement of a neopericardium 4 Days Post-Op    Neuro:  - Switch to PO pain regimen    Resp:  - Minimize supplemental O2 requirements  - Encourage IS, deep breathing, cough  - Chest tubes to remain in place to wall suction    CV:  - Off pressors  - On Cardene for HTN - will d/c  -  Add prn Labetalol   - Continue current scheduled antihypertensives \  - Will increase scheduled hydralazine   -   - metoprolol 25TID  - Sternal precautions    Heme:  - Hgb/Hct stable  - plts stable  - Continue to trend    ID:  - Routine post-operative Abx - Ancef x 5 doses  - 2 g Rocephin q 24 x 6 weeks from date of last negative culture (7/20) per ID recs    Renal:  - Nguyen in place  - Strict I/Os  - BUN/Cr 32/1.2  - Lasix 40 BID    FEN/GI:  - zofran for nausea  - cardiac diet  - Replace lytes PRN  - reglan    Endo:  - Endocrine following, appreciate assistance  - Accuchecks  - Insulin gtt    Dispo:  - stepdown to floor    Cory Matson MD  General Surgery PGY-2  Pager: [phone]

## 2017-07-25 NOTE — CHAPLAIN
"Pt of [employer] background and reflective of how recent diagnosis and surgery have helped bring "positive change" and "meaning and purpose" as he rediscovered a new "worth" towards his life.         "

## 2017-07-26 PROBLEM — F11.21: Status: ACTIVE | Noted: 2017-07-26

## 2017-07-26 PROBLEM — R04.2 HEMOPTYSIS: Status: RESOLVED | Noted: 2017-07-20 | Resolved: 2017-07-26

## 2017-07-26 PROBLEM — E87.6 HYPOKALEMIA: Status: ACTIVE | Noted: 2017-07-26

## 2017-07-26 PROBLEM — F43.20 ADJUSTMENT DISORDER: Chronic | Status: ACTIVE | Noted: 2017-07-26

## 2017-07-26 PROBLEM — F15.21 AMPHETAMINE USE DISORDER, MODERATE, IN EARLY REMISSION: Chronic | Status: ACTIVE | Noted: 2017-07-26

## 2017-07-26 PROBLEM — F43.20 ADJUSTMENT DISORDER: Status: ACTIVE | Noted: 2017-07-26

## 2017-07-26 PROBLEM — F11.21: Chronic | Status: ACTIVE | Noted: 2017-07-26

## 2017-07-26 PROBLEM — F15.21 AMPHETAMINE USE DISORDER, MODERATE, IN EARLY REMISSION: Status: ACTIVE | Noted: 2017-07-26

## 2017-07-26 PROBLEM — R45.89 INEFFECTIVE COPING: Status: ACTIVE | Noted: 2017-07-26

## 2017-07-26 PROBLEM — E83.42 HYPOMAGNESEMIA: Status: ACTIVE | Noted: 2017-07-26

## 2017-07-26 LAB
AMPHET+METHAMPHET UR QL: NEGATIVE
ANION GAP SERPL CALC-SCNC: 12 MMOL/L
BARBITURATES UR QL SCN>200 NG/ML: NEGATIVE
BASOPHILS # BLD AUTO: 0.03 K/UL
BASOPHILS NFR BLD: 0.4 %
BENZODIAZ UR QL SCN>200 NG/ML: NEGATIVE
BUN SERPL-MCNC: 24 MG/DL
BZE UR QL SCN: NEGATIVE
CALCIUM SERPL-MCNC: 9.1 MG/DL
CANNABINOIDS UR QL SCN: NEGATIVE
CHLORIDE SERPL-SCNC: 88 MMOL/L
CO2 SERPL-SCNC: 33 MMOL/L
CREAT SERPL-MCNC: 1 MG/DL
CREAT UR-MCNC: 18 MG/DL
DIFFERENTIAL METHOD: ABNORMAL
EOSINOPHIL # BLD AUTO: 0.2 K/UL
EOSINOPHIL NFR BLD: 2.7 %
ERYTHROCYTE [DISTWIDTH] IN BLOOD BY AUTOMATED COUNT: 16.2 %
EST. GFR  (AFRICAN AMERICAN): >60 ML/MIN/1.73 M^2
EST. GFR  (NON AFRICAN AMERICAN): >60 ML/MIN/1.73 M^2
ETHANOL UR-MCNC: <10 MG/DL
GLUCOSE SERPL-MCNC: 91 MG/DL
HCT VFR BLD AUTO: 26.7 %
HGB BLD-MCNC: 8.7 G/DL
LYMPHOCYTES # BLD AUTO: 1.7 K/UL
LYMPHOCYTES NFR BLD: 23.9 %
MAGNESIUM SERPL-MCNC: 1.7 MG/DL
MCH RBC QN AUTO: 27.2 PG
MCHC RBC AUTO-ENTMCNC: 32.6 G/DL
MCV RBC AUTO: 83 FL
METHADONE UR QL SCN>300 NG/ML: NEGATIVE
MONOCYTES # BLD AUTO: 1.2 K/UL
MONOCYTES NFR BLD: 17.4 %
NEUTROPHILS # BLD AUTO: 3.9 K/UL
NEUTROPHILS NFR BLD: 54.9 %
OPIATES UR QL SCN: NEGATIVE
PCP UR QL SCN>25 NG/ML: NEGATIVE
PHOSPHATE SERPL-MCNC: 3.1 MG/DL
PLATELET # BLD AUTO: 368 K/UL
PMV BLD AUTO: 8.6 FL
POCT GLUCOSE: 99 MG/DL (ref 70–110)
POTASSIUM SERPL-SCNC: 3.2 MMOL/L
RBC # BLD AUTO: 3.2 M/UL
SODIUM SERPL-SCNC: 133 MMOL/L
TOXICOLOGY INFORMATION: ABNORMAL
WBC # BLD AUTO: 7.12 K/UL

## 2017-07-26 PROCEDURE — 25000003 PHARM REV CODE 250: Performed by: NURSE PRACTITIONER

## 2017-07-26 PROCEDURE — 02HV33Z INSERTION OF INFUSION DEVICE INTO SUPERIOR VENA CAVA, PERCUTANEOUS APPROACH: ICD-10-PCS | Performed by: THORACIC SURGERY (CARDIOTHORACIC VASCULAR SURGERY)

## 2017-07-26 PROCEDURE — 25000003 PHARM REV CODE 250: Performed by: STUDENT IN AN ORGANIZED HEALTH CARE EDUCATION/TRAINING PROGRAM

## 2017-07-26 PROCEDURE — 63600175 PHARM REV CODE 636 W HCPCS: Performed by: THORACIC SURGERY (CARDIOTHORACIC VASCULAR SURGERY)

## 2017-07-26 PROCEDURE — 85025 COMPLETE CBC W/AUTO DIFF WBC: CPT

## 2017-07-26 PROCEDURE — 20600001 HC STEP DOWN PRIVATE ROOM

## 2017-07-26 PROCEDURE — C1751 CATH, INF, PER/CENT/MIDLINE: HCPCS

## 2017-07-26 PROCEDURE — 97803 MED NUTRITION INDIV SUBSEQ: CPT

## 2017-07-26 PROCEDURE — 90792 PSYCH DIAG EVAL W/MED SRVCS: CPT | Mod: AF,S$PBB,, | Performed by: PSYCHIATRY & NEUROLOGY

## 2017-07-26 PROCEDURE — 25000003 PHARM REV CODE 250: Performed by: THORACIC SURGERY (CARDIOTHORACIC VASCULAR SURGERY)

## 2017-07-26 PROCEDURE — 36569 INSJ PICC 5 YR+ W/O IMAGING: CPT

## 2017-07-26 PROCEDURE — 83735 ASSAY OF MAGNESIUM: CPT

## 2017-07-26 PROCEDURE — 84100 ASSAY OF PHOSPHORUS: CPT

## 2017-07-26 PROCEDURE — 63600175 PHARM REV CODE 636 W HCPCS: Performed by: NURSE PRACTITIONER

## 2017-07-26 PROCEDURE — 76937 US GUIDE VASCULAR ACCESS: CPT

## 2017-07-26 PROCEDURE — 80307 DRUG TEST PRSMV CHEM ANLYZR: CPT

## 2017-07-26 PROCEDURE — 80048 BASIC METABOLIC PNL TOTAL CA: CPT

## 2017-07-26 RX ORDER — SODIUM CHLORIDE 0.9 % (FLUSH) 0.9 %
10 SYRINGE (ML) INJECTION EVERY 6 HOURS
Status: DISCONTINUED | OUTPATIENT
Start: 2017-07-26 | End: 2017-08-08 | Stop reason: HOSPADM

## 2017-07-26 RX ORDER — FUROSEMIDE 10 MG/ML
20 INJECTION INTRAMUSCULAR; INTRAVENOUS 2 TIMES DAILY
Status: DISCONTINUED | OUTPATIENT
Start: 2017-07-26 | End: 2017-07-27

## 2017-07-26 RX ORDER — LANOLIN ALCOHOL/MO/W.PET/CERES
400 CREAM (GRAM) TOPICAL 2 TIMES DAILY
Status: DISCONTINUED | OUTPATIENT
Start: 2017-07-26 | End: 2017-08-08 | Stop reason: HOSPADM

## 2017-07-26 RX ORDER — POTASSIUM CHLORIDE 20 MEQ/1
20 TABLET, EXTENDED RELEASE ORAL 2 TIMES DAILY
Status: DISCONTINUED | OUTPATIENT
Start: 2017-07-26 | End: 2017-07-27

## 2017-07-26 RX ORDER — SODIUM CHLORIDE 0.9 % (FLUSH) 0.9 %
10 SYRINGE (ML) INJECTION
Status: DISCONTINUED | OUTPATIENT
Start: 2017-07-26 | End: 2017-08-08 | Stop reason: HOSPADM

## 2017-07-26 RX ADMIN — OXYCODONE HYDROCHLORIDE 15 MG: 5 TABLET ORAL at 09:07

## 2017-07-26 RX ADMIN — OXYCODONE HYDROCHLORIDE 15 MG: 5 TABLET ORAL at 05:07

## 2017-07-26 RX ADMIN — POTASSIUM CHLORIDE 20 MEQ: 1500 TABLET, EXTENDED RELEASE ORAL at 09:07

## 2017-07-26 RX ADMIN — METOPROLOL TARTRATE 25 MG: 25 TABLET ORAL at 02:07

## 2017-07-26 RX ADMIN — POLYETHYLENE GLYCOL 3350 17 G: 17 POWDER, FOR SOLUTION ORAL at 08:07

## 2017-07-26 RX ADMIN — CEFTRIAXONE 2 G: 2 INJECTION, SOLUTION INTRAVENOUS at 08:07

## 2017-07-26 RX ADMIN — MAGNESIUM OXIDE TAB 400 MG (241.3 MG ELEMENTAL MG) 400 MG: 400 (241.3 MG) TAB at 12:07

## 2017-07-26 RX ADMIN — MAGNESIUM OXIDE TAB 400 MG (241.3 MG ELEMENTAL MG) 400 MG: 400 (241.3 MG) TAB at 09:07

## 2017-07-26 RX ADMIN — FUROSEMIDE 40 MG: 10 INJECTION, SOLUTION INTRAVENOUS at 08:07

## 2017-07-26 RX ADMIN — HYDRALAZINE HYDROCHLORIDE 50 MG: 50 TABLET ORAL at 09:07

## 2017-07-26 RX ADMIN — AMLODIPINE BESYLATE 5 MG: 5 TABLET ORAL at 08:07

## 2017-07-26 RX ADMIN — HYDRALAZINE HYDROCHLORIDE 50 MG: 50 TABLET ORAL at 02:07

## 2017-07-26 RX ADMIN — METOPROLOL TARTRATE 25 MG: 25 TABLET ORAL at 09:07

## 2017-07-26 RX ADMIN — METOPROLOL TARTRATE 25 MG: 25 TABLET ORAL at 04:07

## 2017-07-26 RX ADMIN — POTASSIUM CHLORIDE 20 MEQ: 1500 TABLET, EXTENDED RELEASE ORAL at 12:07

## 2017-07-26 RX ADMIN — ASPIRIN 325 MG ORAL TABLET 325 MG: 325 PILL ORAL at 08:07

## 2017-07-26 RX ADMIN — FUROSEMIDE 20 MG: 10 INJECTION, SOLUTION INTRAMUSCULAR; INTRAVENOUS at 05:07

## 2017-07-26 RX ADMIN — HYDRALAZINE HYDROCHLORIDE 50 MG: 50 TABLET ORAL at 04:07

## 2017-07-26 RX ADMIN — OXYCODONE HYDROCHLORIDE 15 MG: 5 TABLET ORAL at 04:07

## 2017-07-26 RX ADMIN — OXYCODONE HYDROCHLORIDE 15 MG: 5 TABLET ORAL at 10:07

## 2017-07-26 NOTE — NURSING TRANSFER
Nursing Transfer Note      7/25/2017 at 18:50    Transfer From: SICU    Transfer via wheelchair    Transfer with cardiac monitoring    Transported by SICU RN      Chart send with patient: Yes

## 2017-07-26 NOTE — ASSESSMENT & PLAN NOTE
Nutrition Problem  Increased nutrient needs    Related to (etiology):   Physiological needs- s/p AVR    Signs and Symptoms (as evidenced by):   Chest incision 7/21/17    Interventions/Recommendations (treatment strategy):  See above    Nutrition Diagnosis Status:   New

## 2017-07-26 NOTE — PROGRESS NOTES
"[facility]  Adult Nutrition  Progress Note    SUMMARY     Recommendations    Recommendation/Intervention: 1. continue w/ cardiac diet. 2.Encourage PO intake >/= 75% EEN/EPN w/HVP  each meal. 4. RD to follow  Goals: PO intake >/=75% EEN/EPN  Nutrition Goal Status: new  Communication of RD Recs: reviewed with RN (agrees w recs)    Reason for Assessment    Reason for Assessment: RD follow-up  Diagnosis: cardiac disease (s/p MVR)  Relevant Medical History: infective endocarditis   Interdisciplinary Rounds: did not attend  General Information Comments: pt appetite is good (po intake 100%)  Nutrition Discharge Planning: Pt may need education prior to d/c. Will monitor.     Nutrition Prescription Ordered    Current Diet Order: Cardiac     Evaluation of Received Nutrients/Fluid Intake    Intake/Output Summary (Last 24 hours) at 07/26/17 1149  Last data filed at 07/26/17 0400   Gross per 24 hour   Intake              360 ml   Output                0 ml   Net              360 ml         LBM:7/24/17    % Intake of Estimated Energy Needs: 75 - 100 %  % Meal Intake: 100%     Nutrition Risk Screen     Nutrition Risk Screen: no indicators present    Nutrition/Diet History    Patient Reported Diet/Restrictions/Preferences: general  Typical Food/Fluid Intake: Adequate PTA  Food Preferences: No cultural or Orthodoxy preferences noted  Factors Affecting Nutritional Intake:  (-)    Labs/Tests/Procedures/Meds    Pertinent Labs Reviewed: reviewed  Pertinent Labs Comments: Na-133, Cl-88, BUN-24, K-3.2, Glu-91, Alb-2.5, AST-132, ALT-77  Pertinent Medications Reviewed: reviewed  Pertinent Medications Comments: Lasix, Insulin, lactulose, KCl    Physical Findings    Overall Physical Appearance: nourished  Tubes: other (see comments) (chest tube - 118mL output)  Oral/Mouth Cavity: WDL  Skin: incision (in chest)    Anthropometrics    Temp: 98.2 °F (36.8 °C)     Height: 5' 8" (172.7 cm)  Weight Method: Bed Scale  Weight: 67.9 " kg (149 lb 11.1 oz)  Ideal Body Weight (IBW), Male: 154 lb  % Ideal Body Weight, Male (lb): 100.35 lb  BMI (Calculated): 23.5  BMI Grade: 18.5-24.9 - normal     Estimated/Assessed Needs    Weight Used For Calorie Calculations: 67.9 kg (149 lb 11.1 oz)   Energy Calorie Requirements (kcal): 2075  Energy Need Method: Travis-St Jeor (1.25 PAL)   RMR (Travis-St. Jeor Equation): 1638.5  Weight Used For Protein Calculations: 67.9 kg (149 lb 11.1 oz)  Protein Requirements: 70-84g (1-1.2g/kg)  Fluid Requirements (mL): per MD  Fluid Need Method: RDA Method (or per MD)  RDA Method (mL): 2075      Assessment and Plan    * S/P AVR (aortic valve replacement)    Nutrition Problem  Increased nutrient needs    Related to (etiology):   Physiological needs- s/p AVR    Signs and Symptoms (as evidenced by):   Chest incision 7/21/17    Interventions/Recommendations (treatment strategy):  See above    Nutrition Diagnosis Status:   New                  Monitor and Evaluation    Food and Nutrient Intake: energy intake, food and beverage intake  Food and Nutrient Adminstration: diet order  Knowledge/Beliefs/Attitudes: food and nutrition knowledge/skill, beliefs and attitudes  Anthropometric Measurements: weight, weight change  Biochemical Data, Medical Tests and Procedures: other (specify) (All labs)  Nutrition-Focused Physical Findings: overall appearance    Nutrition Risk    Level of Risk:  (f/u 1x wk)    Nutrition Follow-Up    RD Follow-up?: Yes
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
## 2017-07-26 NOTE — ASSESSMENT & PLAN NOTE
- Pt admits to a hx of opiate use (with 2 instances of IVDU in the past). He was on Suboxone prior to this hospitalization but is not interested in getting restarted on any replacement medications at this time.   - Counseled on cessation. Pt is not interested in substance abuse rehab currently.

## 2017-07-26 NOTE — NURSING TRANSFER
Nursing Transfer Note      7/25/2017     Transfer To: [id number]    Transfer via wheelchair    Transfer with cardiac monitoring    Transported by RN    Medicines sent: Insulin, muprirocin, antibx    Chart send with patient: Yes    Notified: friend    Patient reassessed at: 6/25/17 3573    Upon arrival to floor: cardiac monitor applied, patient oriented to room, call bell in reach and bed in lowest position, 3rd floor RN to bedside to assess pt.
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
## 2017-07-26 NOTE — CONSULTS
Double lumen PICC placed in right basilic vein , 46cm in length with 0cm exposed. Arm circumference 25cm. Lot #[id number].
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
## 2017-07-26 NOTE — ASSESSMENT & PLAN NOTE
- Pt admits to hx of amphetamine use, primarily via overuse/abuse of prescribed stimulant medications.   - Counseled on cessation. Pt is not interested in substance abuse rehab currently.

## 2017-07-26 NOTE — PROCEDURES
"Kwan Palacio is a 26 y.o. male patient.    Temp: 98.2 °F (36.8 °C) (07/26/17 0745)  Pulse: 84 (07/26/17 1000)  Resp: (!) 22 (07/26/17 0745)  BP: (!) 151/92 (07/26/17 0745)  SpO2: 99 % (07/26/17 0745)  Weight: 67.9 kg (149 lb 11.1 oz) (07/26/17 0424)  Height: 5' 8" (172.7 cm) (07/22/17 0700)    PICC  Date/Time: 7/26/2017 11:56 AM  Performed by: ATIF GAO  Consent Done: Yes  Time out: Immediately prior to procedure a time out was called to verify the correct patient, procedure, equipment, support staff and site/side marked as required  Indications: med administration and vascular access  Anesthesia: local infiltration  Local anesthetic: lidocaine 1% without epinephrine  Anesthetic Total (mL): 3  Preparation: skin prepped with ChloraPrep  Skin prep agent dried: skin prep agent completely dried prior to procedure  Sterile barriers: all five maximum sterile barriers used - cap, mask, sterile gown, sterile gloves, and large sterile sheet  Hand hygiene: hand hygiene performed prior to central venous catheter insertion  Location details: right basilic  Catheter type: double lumen  Catheter size: 5 Fr  Catheter Length: 46cm    Ultrasound guidance: yes  Vessel Caliber: medium and patent, compressibility normal  Vascular Doppler: not done  Needle advanced into vessel with real time Ultrasound guidance.  Guidewire confirmed in vessel.  Image recorded and saved.  Sterile sheath used.  no esophageal manometryNumber of attempts: 1  Post-procedure: blood return through all ports, chlorhexidine patch and sterile dressing applied  Specimens: No  Implants: No  Assessment: placement verified by x-ray        Ansley Brady  7/26/2017  "
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
## 2017-07-26 NOTE — PLAN OF CARE
Problem: Patient Care Overview  Goal: Plan of Care Review  Outcome: Ongoing (interventions implemented as appropriate)  Pt remained stable throughout the night. No acute distress noted. Pt remained free from injury. VSS. Pt denies any chest pain or SOB. midsternal incision. Pacer wires secures. BRITNEY mason Nacl @ 10. Trying to control BP.  Pt understands plan of care. Will continue to monitor.

## 2017-07-26 NOTE — PLAN OF CARE
NISH met with the pt to discuss LTAC.  SW explained that due to his insurance, SW can contact the ones in network which are [facility]  SW explained that if they deny then the SW can contact \[facility]\"".  The insurance might do a contract with \[facility]\"".if there are 3 denials from inntwk providers.  THe SW called the pt's step mom per his request to discuss this also.  NISH called July ([phone]).  She stated that they will do what is recommended by the Dr but they would rather the pt come home and get HH.  NISH explained that due to the insurance the pt could likely not get HH. She stated that they could bring him daily to the [facility] on the [place] to get the antibiotics daily.  NISH informed her that she would ask the team.  NISH spoke with the NP and she stated that due to his drug history, they will only be agreeable to the pt going to LTAC.  NISH sent a referral request to the [facility] for the above LTACs.  NISH will f/u tomorrow.    Marcella Fontenot, LCSMARGIE x [phone]

## 2017-07-26 NOTE — PLAN OF CARE
Problem: Patient Care Overview  Goal: Individualization & Mutuality  DX: Bacterial Endocarditis    Hx: L shoulder dislocation    Sx: 7/21 Aortic valve replacement,mitral valve repair,aortic root repair,neoparicardial placement     Nursing  - MAP <90   Outcome: Ongoing (interventions implemented as appropriate)  Patient progressing toward all goals. Plan of care discussed with patient, no questions at this time. Patient ambulating independently, fall precautions in place. VS WNL; PICC placed to R upper arm;  Will monitor.

## 2017-07-27 LAB
ANION GAP SERPL CALC-SCNC: 13 MMOL/L
BASOPHILS # BLD AUTO: 0.03 K/UL
BASOPHILS NFR BLD: 0.4 %
BUN SERPL-MCNC: 18 MG/DL
CALCIUM SERPL-MCNC: 9.3 MG/DL
CHLORIDE SERPL-SCNC: 88 MMOL/L
CO2 SERPL-SCNC: 33 MMOL/L
CREAT SERPL-MCNC: 0.9 MG/DL
DIFFERENTIAL METHOD: ABNORMAL
EOSINOPHIL # BLD AUTO: 0.3 K/UL
EOSINOPHIL NFR BLD: 4.4 %
ERYTHROCYTE [DISTWIDTH] IN BLOOD BY AUTOMATED COUNT: 15.8 %
EST. GFR  (AFRICAN AMERICAN): >60 ML/MIN/1.73 M^2
EST. GFR  (NON AFRICAN AMERICAN): >60 ML/MIN/1.73 M^2
GLUCOSE SERPL-MCNC: 95 MG/DL
HCT VFR BLD AUTO: 31.1 %
HGB BLD-MCNC: 10.2 G/DL
LYMPHOCYTES # BLD AUTO: 1.9 K/UL
LYMPHOCYTES NFR BLD: 24.5 %
MAGNESIUM SERPL-MCNC: 2 MG/DL
MCH RBC QN AUTO: 27.2 PG
MCHC RBC AUTO-ENTMCNC: 32.8 G/DL
MCV RBC AUTO: 83 FL
MONOCYTES # BLD AUTO: 1.4 K/UL
MONOCYTES NFR BLD: 18 %
NEUTROPHILS # BLD AUTO: 4 K/UL
NEUTROPHILS NFR BLD: 52.2 %
PHOSPHATE SERPL-MCNC: 3.8 MG/DL
PLATELET # BLD AUTO: 486 K/UL
PMV BLD AUTO: 8.7 FL
POTASSIUM SERPL-SCNC: 3.4 MMOL/L
RBC # BLD AUTO: 3.75 M/UL
SODIUM SERPL-SCNC: 134 MMOL/L
WBC # BLD AUTO: 7.74 K/UL

## 2017-07-27 PROCEDURE — 02H633Z INSERTION OF INFUSION DEVICE INTO RIGHT ATRIUM, PERCUTANEOUS APPROACH: ICD-10-PCS | Performed by: THORACIC SURGERY (CARDIOTHORACIC VASCULAR SURGERY)

## 2017-07-27 PROCEDURE — 85025 COMPLETE CBC W/AUTO DIFF WBC: CPT

## 2017-07-27 PROCEDURE — 25000003 PHARM REV CODE 250: Performed by: THORACIC SURGERY (CARDIOTHORACIC VASCULAR SURGERY)

## 2017-07-27 PROCEDURE — 63600175 PHARM REV CODE 636 W HCPCS: Performed by: NURSE PRACTITIONER

## 2017-07-27 PROCEDURE — A4216 STERILE WATER/SALINE, 10 ML: HCPCS | Performed by: THORACIC SURGERY (CARDIOTHORACIC VASCULAR SURGERY)

## 2017-07-27 PROCEDURE — 97116 GAIT TRAINING THERAPY: CPT

## 2017-07-27 PROCEDURE — 63600175 PHARM REV CODE 636 W HCPCS: Performed by: THORACIC SURGERY (CARDIOTHORACIC VASCULAR SURGERY)

## 2017-07-27 PROCEDURE — C1751 CATH, INF, PER/CENT/MIDLINE: HCPCS

## 2017-07-27 PROCEDURE — 84100 ASSAY OF PHOSPHORUS: CPT

## 2017-07-27 PROCEDURE — 83735 ASSAY OF MAGNESIUM: CPT

## 2017-07-27 PROCEDURE — 25000003 PHARM REV CODE 250: Performed by: STUDENT IN AN ORGANIZED HEALTH CARE EDUCATION/TRAINING PROGRAM

## 2017-07-27 PROCEDURE — 25000003 PHARM REV CODE 250: Performed by: NURSE PRACTITIONER

## 2017-07-27 PROCEDURE — 80048 BASIC METABOLIC PNL TOTAL CA: CPT

## 2017-07-27 PROCEDURE — 36584 COMPL RPLCMT PICC RS&I: CPT

## 2017-07-27 PROCEDURE — 20600001 HC STEP DOWN PRIVATE ROOM

## 2017-07-27 PROCEDURE — 02HV33Z INSERTION OF INFUSION DEVICE INTO SUPERIOR VENA CAVA, PERCUTANEOUS APPROACH: ICD-10-PCS | Performed by: THORACIC SURGERY (CARDIOTHORACIC VASCULAR SURGERY)

## 2017-07-27 RX ORDER — METOPROLOL TARTRATE 50 MG/1
50 TABLET ORAL 2 TIMES DAILY
Status: DISCONTINUED | OUTPATIENT
Start: 2017-07-27 | End: 2017-08-08 | Stop reason: HOSPADM

## 2017-07-27 RX ORDER — TRAMADOL HYDROCHLORIDE 50 MG/1
50 TABLET ORAL EVERY 6 HOURS PRN
Status: DISCONTINUED | OUTPATIENT
Start: 2017-07-27 | End: 2017-08-08 | Stop reason: HOSPADM

## 2017-07-27 RX ORDER — FUROSEMIDE 20 MG/1
20 TABLET ORAL 2 TIMES DAILY
Status: DISCONTINUED | OUTPATIENT
Start: 2017-07-27 | End: 2017-08-08

## 2017-07-27 RX ORDER — POTASSIUM CHLORIDE 20 MEQ/1
20 TABLET, EXTENDED RELEASE ORAL 2 TIMES DAILY
Status: DISCONTINUED | OUTPATIENT
Start: 2017-07-27 | End: 2017-08-08

## 2017-07-27 RX ORDER — LISINOPRIL 2.5 MG/1
2.5 TABLET ORAL DAILY
Status: DISCONTINUED | OUTPATIENT
Start: 2017-07-27 | End: 2017-08-08 | Stop reason: HOSPADM

## 2017-07-27 RX ORDER — POTASSIUM CHLORIDE 20 MEQ/1
40 TABLET, EXTENDED RELEASE ORAL 2 TIMES DAILY
Status: DISCONTINUED | OUTPATIENT
Start: 2017-07-27 | End: 2017-07-27

## 2017-07-27 RX ORDER — OXYCODONE AND ACETAMINOPHEN 5; 325 MG/1; MG/1
1 TABLET ORAL EVERY 4 HOURS PRN
Status: DISCONTINUED | OUTPATIENT
Start: 2017-07-27 | End: 2017-07-31

## 2017-07-27 RX ORDER — AMLODIPINE BESYLATE 10 MG/1
10 TABLET ORAL DAILY
Status: DISCONTINUED | OUTPATIENT
Start: 2017-07-28 | End: 2017-08-08 | Stop reason: HOSPADM

## 2017-07-27 RX ADMIN — MAGNESIUM OXIDE TAB 400 MG (241.3 MG ELEMENTAL MG) 400 MG: 400 (241.3 MG) TAB at 08:07

## 2017-07-27 RX ADMIN — LISINOPRIL 2.5 MG: 2.5 TABLET ORAL at 12:07

## 2017-07-27 RX ADMIN — AMLODIPINE BESYLATE 5 MG: 5 TABLET ORAL at 08:07

## 2017-07-27 RX ADMIN — METOPROLOL TARTRATE 50 MG: 50 TABLET, FILM COATED ORAL at 08:07

## 2017-07-27 RX ADMIN — OXYCODONE HYDROCHLORIDE AND ACETAMINOPHEN 1 TABLET: 5; 325 TABLET ORAL at 08:07

## 2017-07-27 RX ADMIN — POTASSIUM CHLORIDE 20 MEQ: 1500 TABLET, EXTENDED RELEASE ORAL at 08:07

## 2017-07-27 RX ADMIN — POLYETHYLENE GLYCOL 3350 17 G: 17 POWDER, FOR SOLUTION ORAL at 08:07

## 2017-07-27 RX ADMIN — ASPIRIN 325 MG ORAL TABLET 325 MG: 325 PILL ORAL at 08:07

## 2017-07-27 RX ADMIN — POTASSIUM CHLORIDE 40 MEQ: 1500 TABLET, EXTENDED RELEASE ORAL at 08:07

## 2017-07-27 RX ADMIN — Medication 10 ML: at 09:07

## 2017-07-27 RX ADMIN — FUROSEMIDE 20 MG: 10 INJECTION, SOLUTION INTRAMUSCULAR; INTRAVENOUS at 08:07

## 2017-07-27 RX ADMIN — OXYCODONE HYDROCHLORIDE AND ACETAMINOPHEN 1 TABLET: 5; 325 TABLET ORAL at 05:07

## 2017-07-27 RX ADMIN — CEFTRIAXONE 2 G: 2 INJECTION, SOLUTION INTRAVENOUS at 08:07

## 2017-07-27 RX ADMIN — OXYCODONE HYDROCHLORIDE AND ACETAMINOPHEN 1 TABLET: 5; 325 TABLET ORAL at 11:07

## 2017-07-27 RX ADMIN — FUROSEMIDE 20 MG: 20 TABLET ORAL at 05:07

## 2017-07-27 RX ADMIN — Medication 10 ML: at 06:07

## 2017-07-27 RX ADMIN — METOPROLOL TARTRATE 25 MG: 25 TABLET ORAL at 06:07

## 2017-07-27 RX ADMIN — HYDRALAZINE HYDROCHLORIDE 50 MG: 50 TABLET ORAL at 06:07

## 2017-07-27 NOTE — PLAN OF CARE
Pt was denied by [facility].  NISH left a message for [facility].  NISH spoke Nereida at [facility] and she has not looked at it yet.  She will review it today or tomorrow.  NISH spoke with Laurie at [facility].  She stated that she submitted for auth.  However, the pt must be denied from 3 ltacs for them to do a contract with [facility].  NISH spoke with Clarisse at [facility] ([phone]).  She stated that she has this pt but has not received a request yet.  NISH will f/u tomorrow.    Marcella Fontenot, [facility] x [phone]

## 2017-07-27 NOTE — PROGRESS NOTES
Progress Note  Cardiothoracic Surgery    Admit Date: 7/19/2017  Post-operative Day: 6 Days Post-Op  Hospital Day: 9    SUBJECTIVE:  No acute events overnight.  SR per monitor.     Follow-up For: Procedure(s) (LRB):  REPLACEMENT-VALVE-AORTIC (N/A)  REPAIR VALVE-MITRAL (N/A)  REPAIR AORTIC ROOT (N/A)  PLACEMENT-NEOPERICARDIUM (N/A)    Scheduled Meds:   [START ON 7/28/2017] amlodipine  10 mg Oral Daily    aspirin  325 mg Oral Daily    cefTRIAXone (ROCEPHIN) IVPB  2 g Intravenous Q24H    furosemide  20 mg Oral BID    lisinopril  2.5 mg Oral Daily    magnesium oxide  400 mg Oral BID    metoprolol tartrate  50 mg Oral BID    nicotine  1 patch Transdermal Daily    potassium chloride SA  20 mEq Oral BID    sodium chloride 0.9%  10 mL Intravenous Q6H     Infusions/Drips:   PRN Meds: dextrose 50%, dextrose 50%, glucagon (human recombinant), glucose, glucose, insulin aspart, lactulose, ondansetron, oxycodone-acetaminophen, pneumoc 13-edith conj-dip cr(PF), Flushing PICC Protocol **AND** sodium chloride 0.9% **AND** sodium chloride 0.9%    Review of patient's allergies indicates:  No Known Allergies    OBJECTIVE:     Vital Signs (Most Recent)  Temp: 100.3 °F (37.9 °C) (07/27/17 0800)  Pulse: 90 (07/27/17 1100)  Resp: 18 (07/27/17 0800)  BP: 131/66 (07/27/17 0800)  SpO2: 97 % (07/27/17 0800)    Admission Weight: 68.3 kg (150 lb 9.2 oz) (07/20/17 0059)   Most Recent Weight: 67.9 kg (149 lb 11.1 oz) (07/26/17 0424)    Vital Signs Range (Last 24H):  Temp:  [98.2 °F (36.8 °C)-100.3 °F (37.9 °C)]   Pulse:  [76-98]   Resp:  [18-20]   BP: (122-131)/(66-80)   SpO2:  [97 %-99 %]     I & O (Last 24H):  Intake/Output Summary (Last 24 hours) at 07/27/17 1217  Last data filed at 07/27/17 0500   Gross per 24 hour   Intake              780 ml   Output                0 ml   Net              780 ml     Physical Exam:  General: no distress  Head: normocephalic  Lungs:  normal respiratory effort  Heart: regular rate and rhythm  Abdomen:  soft/nontender   Extremities: warm, well perfused  Skin: Skin color, texture, turgor normal. No rashes or lesions    Wound/Incision:  clean, dry, intact    Laboratory:  CBC:   Recent Labs  Lab 07/27/17  0607   WBC 7.74   RBC 3.75*   HGB 10.2*   HCT 31.1*   *   MCV 83   MCH 27.2   MCHC 32.8     BMP:   Recent Labs  Lab 07/27/17  0607   GLU 95   *   K 3.4*   CL 88*   CO2 33*   BUN 18   CREATININE 0.9   CALCIUM 9.3   MG 2.0       ASSESSMENT/PLAN:     Assessment:   Active Hospital Problems    Diagnosis    *S/P AVR (aortic valve replacement)    Ineffective coping    Hypomagnesemia    Hypokalemia    Adjustment disorder    Opioid use disorder, severe, in early remission, in controlled environment    Amphetamine use disorder, moderate, in early remission    Acute bacterial endocarditis    Subacute bacterial endocarditis    S/P MVR (mitral valve repair)    Stress hyperglycemia    IV drug abuse    Endocarditis       Plan:   -Sternal precautions  -Pt/OT  -Ambulate  -Needs LTAC; will need IV rocephin 2 grams Q 24 hours through   August 31, 2017  -Psych consult to assist with coping  -decrease lasix to 20 mg BID  -replace potassium  -monitor electrolytes and replace prn  -monitor H&H and replace prn  -accuchecks QID  -SSI prn  -Discharge planning ongoing

## 2017-07-27 NOTE — PLAN OF CARE
Problem: Patient Care Overview  Goal: Individualization & Mutuality  DX: Bacterial Endocarditis    Hx: L shoulder dislocation    Sx: 7/21 Aortic valve replacement,mitral valve repair,aortic root repair,neoparicardial placement     Nursing  - MAP <90   Outcome: Ongoing (interventions implemented as appropriate)  Patient progressing toward all goals. Plan of care discussed with patient, no questions at this time. Patient ambulating independently, fall precautions in place. VS  numbers WNL. PICC in place; awaiting placement; Will monitor.

## 2017-07-27 NOTE — PLAN OF CARE
Problem: Physical Therapy Goal  Goal: Physical Therapy Goal  Goals to be met by:      Patient will increase functional independence with mobility by performin. Supine to sit with Stand-by Assistance  2. Sit to supine with Stand-by Assistance  3. Sit to stand transfer with Stand-by Assistance  4. Gait  x 100 feet with Stand-by Assistance   Outcome: Ongoing (interventions implemented as appropriate)  Goals remain appropriate. Continue with plan of care.

## 2017-07-27 NOTE — PROGRESS NOTES
RIJ CVL DC per MD order. Catherter tip intact; Pt instructed to lie flat for 30 min; Pressure held for 5 minutes. Patient tolerated well. No Complaints Noted

## 2017-07-27 NOTE — PLAN OF CARE
The nurse asked the SW to call the pt's Step mom about another LTAC.  SW called her and she wanted the SW to send a referral to [facility] in [place].  SW explained that [facility] is not in network but the SW will send a referral to them anyway. SW sent a referral request to the [facility] for [facility]. SW will f/u tomorrow.    Marcella Fontenot, [facility]ARGIE [phone]

## 2017-07-27 NOTE — PT/OT/SLP PROGRESS
"Physical Therapy  Treatment    Kwan Palacio   MRN: [id number]   Admitting Diagnosis: S/P AVR (aortic valve replacement)    PT Received On: 07/27/17  PT Start Time: 1444     PT Stop Time: 1502    PT Total Time (min): 18 min       Billable Minutes:  Gait [id number]    Treatment Type: Treatment  PT/PTA: PTA     PTA Visit Number: 1       General Precautions: Standard, fall, sternal  Orthopedic Precautions: Full weight bearing   Braces: N/A    Subjective:  Communicated with nsg prior to session.  Pt agreeable to physical therapy today. He reports that he is receiving antibiotics for the infection in his blood and also explained his missing heart valve and the surgery done. The patient shared how he was admitted to the hospital as well. He reports feeling disappointed at losing his muscle mass. The patient also stated that he can feel his heart race now when he is working too hard as opposed to before the surgery where he was not aware of his heart racing.    Pain/Comfort  Pain Rating 1:  (Pt did not report pain. )    Objective:   Pt found sitting in bed with HOB elevated. Pt's nurse  Pamela stated the patient was anxious to walk.     Functional Mobility:  Bed Mobility:   Supine to Sit: Supervision (HOB elevated. )  Sit to Supine: Supervision (HOB elevated)    Transfers:  Sit <> Stand Assistance: Independent  Sit <> Stand Assistive Device: No Assistive Device (Maintains sternal precautions. )    Gait:   Gait Distance: Pt amb >500 feet throught third floor unit. Pt with one long seated rest break due to feeling "winded", then amb ~250 feet back to his room. No lob noted. Pt ascended one- one foot curb step instead of going up a ramp. Pt descended a ramp on the way down. Pt educated on the importance of sternal precautions and verbalized understanding after discussing the risks and benefits.  Assistance 1: Supervision  Gait Assistive Device: No device  Gait Pattern: swing-through gait   Pt with BLE ER during gait. "     Balance:   Static Sit: GOOD: Takes MODERATE challenges from all directions  Dynamic Sit: GOOD: Maintains balance through MODERATE excursions of active trunk movement  Static Stand: GOOD: Takes MODERATE challenges from all directions  Dynamic stand: GOOD-: Needs SUPERVISION only during gait and able to self right with moderate      Therapeutic Activities and Exercises:  Pt educated to sternal precautions (see gait).  Pt with questions about the frequency of his plan of care with PT and OT. Pt educated on this. The patient verbalized understanding and comprehension.     AM-PAC 6 CLICK MOBILITY  How much help from another person does this patient currently need?   1 = Unable, Total/Dependent Assistance  2 = A lot, Maximum/Moderate Assistance  3 = A little, Minimum/Contact Guard/Supervision  4 = None, Modified [place]/Independent    Turning over in bed (including adjusting bedclothes, sheets and blankets)?: 3  Sitting down on and standing up from a chair with arms (e.g., wheelchair, bedside commode, etc.): 4  Moving from lying on back to sitting on the side of the bed?: 3  Moving to and from a bed to a chair (including a wheelchair)?: 3  Need to walk in hospital room?: 3  Climbing 3-5 steps with a railing?: 3  Total Score: 19    AM-PAC Raw Score CMS G-Code Modifier Level of Impairment Assistance   6 % Total / Unable   7 - 9 CM 80 - 100% Maximal Assist   10 - 14 CL 60 - 80% Moderate Assist   15 - 19 CK 40 - 60% Moderate Assist   20 - 22 CJ 20 - 40% Minimal Assist   23 CI 1-20% SBA / CGA   24 CH 0% Independent/ Mod I     Patient left HOB elevated with all lines intact and call button in reach.    Assessment:  Kwan Palacio is a 26 y.o. male with a medical diagnosis of S/P AVR (aortic valve replacement) and presents with the rehab identified problem list below. He is very motivated for therapy. Pt could use reinforcement regarding sternal precaution education. The patient ambulated further today than  during his last physical therapy session. The patient would continue to benefit from skilled PT to address the deficits in his plan of care.     Rehab identified problem list/impairments: Rehab identified problem list/impairments: pain, decreased safety awareness, impaired cardiopulmonary response to activity, weakness, impaired endurance, impaired self care skills, impaired skin    Rehab potential is good.    Activity tolerance: Good    Discharge recommendations: Discharge Facility/Level Of Care Needs: home     Barriers to discharge: Barriers to Discharge: None (Pt reported he will live with his friend in his first floor apt in [place]. )    Equipment recommendations: Equipment Needed After Discharge: none     GOALS:    Physical Therapy Goals        Problem: Physical Therapy Goal    Goal Priority Disciplines Outcome Goal Variances Interventions   Physical Therapy Goal     PT/OT, PT Ongoing (interventions implemented as appropriate)     Description:  Goals to be met by:      Patient will increase functional independence with mobility by performin. Supine to sit with Stand-by Assistance  2. Sit to supine with Stand-by Assistance  3. Sit to stand transfer with Stand-by Assistance  4. Gait  x 100 feet with Stand-by Assistance                    PLAN:    Patient to be seen 4 x/week  to address the above listed problems via gait training, therapeutic activities, therapeutic exercises  Plan of Care expires: 17  Plan of Care reviewed with: patient         Adrien CORTEZ Kenneth, PTA  2017

## 2017-07-27 NOTE — CONSULTS
Double lumen PICC replaced over wire in right basilic vein of RUE, 41cm in length with 0cm exposed and 27cm arm circumference. Lot#[id number].

## 2017-07-27 NOTE — PROCEDURES
"Kwan Palacio is a 26 y.o. male patient.    Temp: 100.3 °F (37.9 °C) (07/27/17 0800)  Pulse: 90 (07/27/17 1000)  Resp: 18 (07/27/17 0800)  BP: 131/66 (07/27/17 0800)  SpO2: 97 % (07/27/17 0800)  Weight: 67.9 kg (149 lb 11.1 oz) (07/26/17 0424)  Height: 5' 8" (172.7 cm) (07/22/17 0700)    PICC  Date/Time: 7/27/2017 10:15 AM  Performed by: BETSY GALLO  Consent Done: Yes  Time out: Immediately prior to procedure a time out was called to verify the correct patient, procedure, equipment, support staff and site/side marked as required  Indications: med administration and vascular access  Anesthetic Total (mL): 0  Preparation: skin prepped with ChloraPrep  Skin prep agent dried: skin prep agent completely dried prior to procedure  Sterile barriers: all five maximum sterile barriers used - cap, mask, sterile gown, sterile gloves, and large sterile sheet  Hand hygiene: hand hygiene performed prior to central venous catheter insertion  Location details: right basilic  Catheter type: double lumen  Catheter size: 5 Fr  Catheter Length: 41cm    Ultrasound guidance: yes  Vessel Caliber: medium and patent, compressibility normal  Vascular Doppler: not done  Needle advanced into vessel with real time Ultrasound guidance.  Guidewire confirmed in vessel.  Image recorded and saved.  Sterile sheath used.  no esophageal manometryNumber of attempts: 1  Post-procedure: blood return through all ports, chlorhexidine patch and sterile dressing applied  Specimens: No  Implants: No  Assessment: placement verified by x-ray  Complications: none  Comments: PICC exchanged over wire        Tammy Covington  7/27/2017    "

## 2017-07-27 NOTE — PLAN OF CARE
Problem: Patient Care Overview  Goal: Plan of Care Review  Outcome: Ongoing (interventions implemented as appropriate)  Reviewed sternal precautions with patient.  Pt states understanding.  IS instructions given.  PICC placed today but still needs to be pulled back a few cm.  MD notified and okay leave till picc team returns in AM.  No arrhthymias noted.  Will cont to monitor.  Camera in room.

## 2017-07-28 LAB
ANION GAP SERPL CALC-SCNC: 11 MMOL/L
BACTERIA SPEC ANAEROBE CULT: NORMAL
BASOPHILS # BLD AUTO: 0.04 K/UL
BASOPHILS NFR BLD: 0.5 %
BUN SERPL-MCNC: 18 MG/DL
CALCIUM SERPL-MCNC: 9.3 MG/DL
CHLORIDE SERPL-SCNC: 94 MMOL/L
CO2 SERPL-SCNC: 30 MMOL/L
CREAT SERPL-MCNC: 0.8 MG/DL
DIFFERENTIAL METHOD: ABNORMAL
EOSINOPHIL # BLD AUTO: 0.1 K/UL
EOSINOPHIL NFR BLD: 1.6 %
ERYTHROCYTE [DISTWIDTH] IN BLOOD BY AUTOMATED COUNT: 15.6 %
EST. GFR  (AFRICAN AMERICAN): >60 ML/MIN/1.73 M^2
EST. GFR  (NON AFRICAN AMERICAN): >60 ML/MIN/1.73 M^2
GLUCOSE SERPL-MCNC: 100 MG/DL
HCT VFR BLD AUTO: 32.2 %
HGB BLD-MCNC: 10.4 G/DL
LYMPHOCYTES # BLD AUTO: 2.6 K/UL
LYMPHOCYTES NFR BLD: 32.7 %
MAGNESIUM SERPL-MCNC: 2 MG/DL
MCH RBC QN AUTO: 26.9 PG
MCHC RBC AUTO-ENTMCNC: 32.3 G/DL
MCV RBC AUTO: 83 FL
MONOCYTES # BLD AUTO: 1.1 K/UL
MONOCYTES NFR BLD: 14.2 %
NEUTROPHILS # BLD AUTO: 4.1 K/UL
NEUTROPHILS NFR BLD: 50.4 %
PHOSPHATE SERPL-MCNC: 3.5 MG/DL
PLATELET # BLD AUTO: 526 K/UL
PMV BLD AUTO: 8.3 FL
POTASSIUM SERPL-SCNC: 3.8 MMOL/L
RBC # BLD AUTO: 3.86 M/UL
SODIUM SERPL-SCNC: 135 MMOL/L
WBC # BLD AUTO: 8.05 K/UL

## 2017-07-28 PROCEDURE — 25000003 PHARM REV CODE 250: Performed by: NURSE PRACTITIONER

## 2017-07-28 PROCEDURE — 63600175 PHARM REV CODE 636 W HCPCS: Performed by: THORACIC SURGERY (CARDIOTHORACIC VASCULAR SURGERY)

## 2017-07-28 PROCEDURE — A4216 STERILE WATER/SALINE, 10 ML: HCPCS | Performed by: THORACIC SURGERY (CARDIOTHORACIC VASCULAR SURGERY)

## 2017-07-28 PROCEDURE — 20600001 HC STEP DOWN PRIVATE ROOM

## 2017-07-28 PROCEDURE — 25000003 PHARM REV CODE 250: Performed by: THORACIC SURGERY (CARDIOTHORACIC VASCULAR SURGERY)

## 2017-07-28 PROCEDURE — 97530 THERAPEUTIC ACTIVITIES: CPT

## 2017-07-28 PROCEDURE — 80048 BASIC METABOLIC PNL TOTAL CA: CPT

## 2017-07-28 PROCEDURE — 85025 COMPLETE CBC W/AUTO DIFF WBC: CPT

## 2017-07-28 PROCEDURE — 84100 ASSAY OF PHOSPHORUS: CPT

## 2017-07-28 PROCEDURE — 83735 ASSAY OF MAGNESIUM: CPT

## 2017-07-28 RX ORDER — NYSTATIN 500000 [USP'U]/1
500000 TABLET, COATED ORAL EVERY 8 HOURS
Status: DISCONTINUED | OUTPATIENT
Start: 2017-07-28 | End: 2017-08-03

## 2017-07-28 RX ADMIN — POTASSIUM CHLORIDE 20 MEQ: 1500 TABLET, EXTENDED RELEASE ORAL at 08:07

## 2017-07-28 RX ADMIN — OXYCODONE HYDROCHLORIDE AND ACETAMINOPHEN 1 TABLET: 5; 325 TABLET ORAL at 06:07

## 2017-07-28 RX ADMIN — METOPROLOL TARTRATE 50 MG: 50 TABLET, FILM COATED ORAL at 08:07

## 2017-07-28 RX ADMIN — FUROSEMIDE 20 MG: 20 TABLET ORAL at 08:07

## 2017-07-28 RX ADMIN — OXYCODONE HYDROCHLORIDE AND ACETAMINOPHEN 1 TABLET: 5; 325 TABLET ORAL at 11:07

## 2017-07-28 RX ADMIN — LISINOPRIL 2.5 MG: 2.5 TABLET ORAL at 08:07

## 2017-07-28 RX ADMIN — Medication 10 ML: at 12:07

## 2017-07-28 RX ADMIN — OXYCODONE HYDROCHLORIDE AND ACETAMINOPHEN 1 TABLET: 5; 325 TABLET ORAL at 04:07

## 2017-07-28 RX ADMIN — MAGNESIUM OXIDE TAB 400 MG (241.3 MG ELEMENTAL MG) 400 MG: 400 (241.3 MG) TAB at 09:07

## 2017-07-28 RX ADMIN — FUROSEMIDE 20 MG: 20 TABLET ORAL at 06:07

## 2017-07-28 RX ADMIN — ASPIRIN 325 MG ORAL TABLET 325 MG: 325 PILL ORAL at 08:07

## 2017-07-28 RX ADMIN — Medication 10 ML: at 11:07

## 2017-07-28 RX ADMIN — NYSTATIN 500000 UNITS: 500000 TABLET, COATED ORAL at 09:07

## 2017-07-28 RX ADMIN — CEFTRIAXONE 2 G: 2 INJECTION, SOLUTION INTRAVENOUS at 08:07

## 2017-07-28 RX ADMIN — MAGNESIUM OXIDE TAB 400 MG (241.3 MG ELEMENTAL MG) 400 MG: 400 (241.3 MG) TAB at 08:07

## 2017-07-28 RX ADMIN — Medication 10 ML: at 06:07

## 2017-07-28 RX ADMIN — TRAMADOL HYDROCHLORIDE 50 MG: 50 TABLET, COATED ORAL at 09:07

## 2017-07-28 RX ADMIN — OXYCODONE HYDROCHLORIDE AND ACETAMINOPHEN 1 TABLET: 5; 325 TABLET ORAL at 12:07

## 2017-07-28 RX ADMIN — TRAMADOL HYDROCHLORIDE 50 MG: 50 TABLET, COATED ORAL at 08:07

## 2017-07-28 RX ADMIN — AMLODIPINE BESYLATE 10 MG: 10 TABLET ORAL at 08:07

## 2017-07-28 RX ADMIN — METOPROLOL TARTRATE 50 MG: 50 TABLET, FILM COATED ORAL at 09:07

## 2017-07-28 RX ADMIN — POTASSIUM CHLORIDE 20 MEQ: 1500 TABLET, EXTENDED RELEASE ORAL at 09:07

## 2017-07-28 NOTE — PLAN OF CARE
[facility].  [facility] will request auth.  SW/CM will f/u on MONday.    Marcella Fontenot, [facility]  [phone]

## 2017-07-28 NOTE — PLAN OF CARE
Problem: Patient Care Overview  Goal: Plan of Care Review  Outcome: Ongoing (interventions implemented as appropriate)  Pt ambulating in hallway.  Tolerated well.  Reviewed sternal precautions with patient.  Pt states understanding.  Will cont to monitor.

## 2017-07-28 NOTE — PLAN OF CARE
Problem: Occupational Therapy Goal  Goal: Occupational Therapy Goal  Goals to be met by: 8/1/17     Patient will increase functional independence with ADLs by performing:    Feeding with [place].  UE Dressing with Modified [place].  LE Dressing with Modified [place].  Grooming while standing at sink with Modified [place].  Toileting from toilet with Modified [place] for hygiene and clothing management.   Toilet transfer to toilet with Modified [place].     Goals achieved.

## 2017-07-28 NOTE — PROGRESS NOTES
Progress Note  Cardiothoracic Surgery    Admit Date: 7/19/2017  Post-operative Day: 7 Days Post-Op  Hospital Day: 10    SUBJECTIVE:  No acute events overnight.  SR per monitor.     Follow-up For: Procedure(s) (LRB):  REPLACEMENT-VALVE-AORTIC (N/A)  REPAIR VALVE-MITRAL (N/A)  REPAIR AORTIC ROOT (N/A)  PLACEMENT-NEOPERICARDIUM (N/A)    Scheduled Meds:   amlodipine  10 mg Oral Daily    aspirin  325 mg Oral Daily    cefTRIAXone (ROCEPHIN) IVPB  2 g Intravenous Q24H    furosemide  20 mg Oral BID    lisinopril  2.5 mg Oral Daily    magnesium oxide  400 mg Oral BID    metoprolol tartrate  50 mg Oral BID    nicotine  1 patch Transdermal Daily    potassium chloride SA  20 mEq Oral BID    sodium chloride 0.9%  10 mL Intravenous Q6H     Infusions/Drips:   PRN Meds: dextrose 50%, dextrose 50%, glucagon (human recombinant), glucose, glucose, insulin aspart, lactulose, ondansetron, oxycodone-acetaminophen, pneumoc 13-edith conj-dip cr(PF), Flushing PICC Protocol **AND** sodium chloride 0.9% **AND** sodium chloride 0.9%, tramadol    Review of patient's allergies indicates:  No Known Allergies    OBJECTIVE:     Vital Signs (Most Recent)  Temp: 98.4 °F (36.9 °C) (07/28/17 0400)  Pulse: (!) 114 (07/28/17 0700)  Resp: 18 (07/28/17 0400)  BP: 124/81 (07/28/17 0833)  SpO2: 99 % (07/28/17 0400)    Admission Weight: 68.3 kg (150 lb 9.2 oz) (07/20/17 0059)   Most Recent Weight: 67.9 kg (149 lb 11.1 oz) (07/26/17 0424)    Vital Signs Range (Last 24H):  Temp:  [98.1 °F (36.7 °C)-99.4 °F (37.4 °C)]   Pulse:  []   Resp:  [18-20]   BP: (103-135)/(62-87)   SpO2:  [98 %-99 %]     I & O (Last 24H):  Intake/Output Summary (Last 24 hours) at 07/28/17 0947  Last data filed at 07/28/17 0400   Gross per 24 hour   Intake             1320 ml   Output                0 ml   Net             1320 ml     Physical Exam:  General: no distress  Head: normocephalic  Lungs:  normal respiratory effort  Heart: regular rate and rhythm  Abdomen:  soft/nontender   Extremities: warm, well perfused  Skin: Skin color, texture, turgor normal. No rashes or lesions  Neurologic: Alert and oriented. Thought content appropriate    Wound/Incision:  clean, dry, intact    Laboratory:  CBC:   Recent Labs  Lab 07/28/17  0603   WBC 8.05   RBC 3.86*   HGB 10.4*   HCT 32.2*   *   MCV 83   MCH 26.9*   MCHC 32.3     BMP:   Recent Labs  Lab 07/28/17  0603      *   K 3.8   CL 94*   CO2 30*   BUN 18   CREATININE 0.8   CALCIUM 9.3   MG 2.0         ASSESSMENT/PLAN:     Assessment:   Active Hospital Problems    Diagnosis    *S/P AVR (aortic valve replacement)    Ineffective coping    Hypomagnesemia    Hypokalemia    Adjustment disorder    Opioid use disorder, severe, in early remission, in controlled environment    Amphetamine use disorder, moderate, in early remission    Acute bacterial endocarditis    Subacute bacterial endocarditis    S/P MVR (mitral valve repair)    Stress hyperglycemia    IV drug abuse    Endocarditis       Plan:   -Sternal precautions  -Pt/OT  -Ambulate  -Needs LTAC; will need IV rocephin 2 grams Q 24 hours through   August 31, 2017  -continue lasix  -replace potassium  -monitor electrolytes and replace prn  -monitor H&H and replace prn  -accuchecks QID  -SSI prn  -Discharge planning ongoing

## 2017-07-28 NOTE — PLAN OF CARE
REFERRAL SENT TO     [facility]. [facility]

## 2017-07-28 NOTE — PLAN OF CARE
NISH left a message for  [facility] and for [facility].  NISH spoke with Yvonne at [facility].  They are still reviewing his record.  She will call the SW back after she finishes reviewing it.  SHe stated that they would have to request a contract with the insurance.  NISH will f/u this afternoon.    Marcella Fontenot, [phone]

## 2017-07-28 NOTE — PROGRESS NOTES
Pt complained of bumps on tongue whenever he receives his IV antibiotics. No itching, swelling, pain, or tingling sensation reported. Small white bumps noted on side of tongue. Notified CTS resident on call. No new orders at this time. Will continue to monitor pt.

## 2017-07-28 NOTE — PLAN OF CARE
Problem: Patient Care Overview  Goal: Individualization & Mutuality  DX: Bacterial Endocarditis    Hx: L shoulder dislocation    Sx: 7/21 Aortic valve replacement,mitral valve repair,aortic root repair,neoparicardial placement     Nursing  - MAP <90   Plan of care discussed with patient.  Patient ambulating independently, fall precautions in place.Continuing to encourage sternal precautions, IS, and ambulation. Patient complains of pain that is mildly controlled with prn pain medications. VS stable. AAOx4. IV antibiotics administered. Discussed medications and care. Patient has no questions at this time. Will continue to monitor.

## 2017-07-28 NOTE — PT/OT/SLP PROGRESS
"Occupational Therapy  Treatment    Kwan Palacio   MRN: [id number]   Admitting Diagnosis: S/P AVR (aortic valve replacement)    OT Date of Treatment: 07/28/17   OT Start Time: 1310  OT Stop Time: 1320  OT Total Time (min): 10 min    Billable Minutes:  Therapeutic Activity 10    General Precautions: Standard, fall, sternal  Orthopedic Precautions:      Do you have any cultural, spiritual, Latter day conflicts, given your current situation?: none reported    Subjective:  Communicated with nsg prior to session.  "I'm feeling good" pt reports.     Pain/Comfort  Pain Rating 1: 0/10    Objective:    Pt found supine sleeping in bed upon first attempt. In afternoon, pt off unit ambulating independently. Pt returned to room independently and t/f self back to bed upon OT arrival.     Pt and nsg report pt has been completing ADL's in room without assistance including showering in his room.  Pt independent with dressing skills.  Pt reports increased fatigue with shower since recent surgery. Pt denied need for shower chair. OT recommending decreasing the temperature of the water. Pt receptive and reports when his shower is less hot, he feels more enduring.  OT discussed safety in hospital and upon d/c re: mobility and ADL skills  and pt receptive and verb understanding.       AM-PAC 6 CLICK ADL   How much help from another person does this patient currently need?   1 = Unable, Total/Dependent Assistance  2 = A lot, Maximum/Moderate Assistance  3 = A little, Minimum/Contact Guard/Supervision  4 = None, Modified [place]/Independent          AM-PAC Raw Score CMS "G-Code Modifier Level of Impairment Assistance   6 % Total / Unable   7 - 8 CM 80 - 100% Maximal Assist   9-13 CL 60 - 80% Moderate Assist   14 - 19 CK 40 - 60% Moderate Assist   20 - 22 CJ 20 - 40% Minimal Assist   23 CI 1-20% SBA / CGA   24 CH 0% Independent/ Mod I       Patient left supine with all lines intact, call button in reach and nsg " notified    ASSESSMENT:  Kwan Palacio is a 26 y.o. male with a medical diagnosis of S/P AVR (aortic valve replacement) and tolerated session well. Pt not longer demo need for skilled OT at this time and OT will d/c services. Goals achieved. .      GOALS:    Occupational Therapy Goals        Problem: Occupational Therapy Goal    Goal Priority Disciplines Outcome Interventions   Occupational Therapy Goal     OT, PT/OT Ongoing (interventions implemented as appropriate)    Description:  Goals to be met by: 8/1/17     Patient will increase functional independence with ADLs by performing:    Feeding with [place].  UE Dressing with Modified [place].  LE Dressing with Modified [place].  Grooming while standing at sink with Modified [place].  Toileting from toilet with Modified [place] for hygiene and clothing management.   Toilet transfer to toilet with Modified [place].                      Plan:  D/c inpatient OT services at this time with goal achievement. Pt receptive to d/c of OT services.        CHANNING Vides  07/28/2017

## 2017-07-29 PROCEDURE — 20600001 HC STEP DOWN PRIVATE ROOM

## 2017-07-29 PROCEDURE — A4216 STERILE WATER/SALINE, 10 ML: HCPCS | Performed by: THORACIC SURGERY (CARDIOTHORACIC VASCULAR SURGERY)

## 2017-07-29 PROCEDURE — 25000003 PHARM REV CODE 250: Performed by: THORACIC SURGERY (CARDIOTHORACIC VASCULAR SURGERY)

## 2017-07-29 PROCEDURE — 25000003 PHARM REV CODE 250: Performed by: NURSE PRACTITIONER

## 2017-07-29 PROCEDURE — 63600175 PHARM REV CODE 636 W HCPCS: Performed by: THORACIC SURGERY (CARDIOTHORACIC VASCULAR SURGERY)

## 2017-07-29 RX ORDER — METHOCARBAMOL 500 MG/1
500 TABLET, FILM COATED ORAL 4 TIMES DAILY
Status: DISCONTINUED | OUTPATIENT
Start: 2017-07-29 | End: 2017-08-03

## 2017-07-29 RX ADMIN — POTASSIUM CHLORIDE 20 MEQ: 1500 TABLET, EXTENDED RELEASE ORAL at 08:07

## 2017-07-29 RX ADMIN — CEFTRIAXONE 2 G: 2 INJECTION, SOLUTION INTRAVENOUS at 09:07

## 2017-07-29 RX ADMIN — METOPROLOL TARTRATE 50 MG: 50 TABLET, FILM COATED ORAL at 08:07

## 2017-07-29 RX ADMIN — OXYCODONE HYDROCHLORIDE AND ACETAMINOPHEN 1 TABLET: 5; 325 TABLET ORAL at 12:07

## 2017-07-29 RX ADMIN — MAGNESIUM OXIDE TAB 400 MG (241.3 MG ELEMENTAL MG) 400 MG: 400 (241.3 MG) TAB at 08:07

## 2017-07-29 RX ADMIN — LISINOPRIL 2.5 MG: 2.5 TABLET ORAL at 09:07

## 2017-07-29 RX ADMIN — POTASSIUM CHLORIDE 20 MEQ: 1500 TABLET, EXTENDED RELEASE ORAL at 09:07

## 2017-07-29 RX ADMIN — METOPROLOL TARTRATE 50 MG: 50 TABLET, FILM COATED ORAL at 09:07

## 2017-07-29 RX ADMIN — NYSTATIN 500000 UNITS: 500000 TABLET, COATED ORAL at 06:07

## 2017-07-29 RX ADMIN — AMLODIPINE BESYLATE 10 MG: 10 TABLET ORAL at 09:07

## 2017-07-29 RX ADMIN — OXYCODONE HYDROCHLORIDE AND ACETAMINOPHEN 1 TABLET: 5; 325 TABLET ORAL at 08:07

## 2017-07-29 RX ADMIN — MAGNESIUM OXIDE TAB 400 MG (241.3 MG ELEMENTAL MG) 400 MG: 400 (241.3 MG) TAB at 09:07

## 2017-07-29 RX ADMIN — FUROSEMIDE 20 MG: 20 TABLET ORAL at 09:07

## 2017-07-29 RX ADMIN — ASPIRIN 325 MG ORAL TABLET 325 MG: 325 PILL ORAL at 09:07

## 2017-07-29 RX ADMIN — Medication 10 ML: at 12:07

## 2017-07-29 RX ADMIN — NYSTATIN 500000 UNITS: 500000 TABLET, COATED ORAL at 09:07

## 2017-07-29 RX ADMIN — FUROSEMIDE 20 MG: 20 TABLET ORAL at 06:07

## 2017-07-29 RX ADMIN — Medication 10 ML: at 06:07

## 2017-07-29 RX ADMIN — NYSTATIN 500000 UNITS: 500000 TABLET, COATED ORAL at 01:07

## 2017-07-29 RX ADMIN — METHOCARBAMOL 500 MG: 500 TABLET ORAL at 01:07

## 2017-07-29 NOTE — PROGRESS NOTES
Progress Note  Cardiothoracic Surgery    Admit Date: 7/19/2017  Post-operative Day: 8 Days Post-Op  Hospital Day: 11    SUBJECTIVE:  No acute issues.  He is complaining of pain despite ultram and percocet.  Will try robaxin as well.  SR per monitor.     Follow-up For: Procedure(s) (LRB):  REPLACEMENT-VALVE-AORTIC (N/A)  REPAIR VALVE-MITRAL (N/A)  REPAIR AORTIC ROOT (N/A)  PLACEMENT-NEOPERICARDIUM (N/A)    Scheduled Meds:   amlodipine  10 mg Oral Daily    aspirin  325 mg Oral Daily    cefTRIAXone (ROCEPHIN) IVPB  2 g Intravenous Q24H    furosemide  20 mg Oral BID    lisinopril  2.5 mg Oral Daily    magnesium oxide  400 mg Oral BID    methocarbamol  500 mg Oral QID    metoprolol tartrate  50 mg Oral BID    nicotine  1 patch Transdermal Daily    nystatin  500,000 Units Oral Q8H    potassium chloride SA  20 mEq Oral BID    sodium chloride 0.9%  10 mL Intravenous Q6H     Infusions/Drips:   PRN Meds: dextrose 50%, dextrose 50%, glucagon (human recombinant), glucose, glucose, insulin aspart, lactulose, ondansetron, oxycodone-acetaminophen, pneumoc 13-ediht conj-dip cr(PF), Flushing PICC Protocol **AND** sodium chloride 0.9% **AND** sodium chloride 0.9%, tramadol    Review of patient's allergies indicates:  No Known Allergies    OBJECTIVE:     Vital Signs (Most Recent)  Temp: 98.1 °F (36.7 °C) (07/29/17 0800)  Pulse: 79 (07/29/17 1000)  Resp: 18 (07/29/17 0800)  BP: 129/76 (07/29/17 0953)  SpO2: 98 % (07/29/17 0800)    Admission Weight: 68.3 kg (150 lb 9.2 oz) (07/20/17 0059)   Most Recent Weight: 67.9 kg (149 lb 11.1 oz) (07/26/17 0424)    Vital Signs Range (Last 24H):  Temp:  [97.7 °F (36.5 °C)-98.1 °F (36.7 °C)]   Pulse:  [66-96]   Resp:  [16-18]   BP: (114-129)/(65-76)   SpO2:  [96 %-99 %]     I & O (Last 24H):  Intake/Output Summary (Last 24 hours) at 07/29/17 1126  Last data filed at 07/28/17 1300   Gross per 24 hour   Intake              240 ml   Output                0 ml   Net              240 ml      Physical Exam:  General: no distress  Head: normocephalic  Lungs:  normal respiratory effort  Heart: regular rate and rhythm  Abdomen: soft/nontender   Extremities: warm, well perfused  Skin: Skin color, texture, turgor normal. No rashes or lesions    Wound/Incision:  clean, dry, intact      ASSESSMENT/PLAN:     Assessment:   Active Hospital Problems    Diagnosis    *S/P AVR (aortic valve replacement)    Ineffective coping    Hypomagnesemia    Hypokalemia    Adjustment disorder    Opioid use disorder, severe, in early remission, in controlled environment    Amphetamine use disorder, moderate, in early remission    Acute bacterial endocarditis    Subacute bacterial endocarditis    S/P MVR (mitral valve repair)    Stress hyperglycemia    IV drug abuse    Endocarditis       Plan:     -Sternal precautions  -Pt/OT  -Ambulate  -Needs LTAC; will need IV rocephin 2 grams Q 24 hours through   August 31, 2017  -continue lasix  -obtain labs q M-W-F  -monitor electrolytes and replace prn  -monitor H&H and replace prn  -accuchecks QID  -SSI prn  -Discharge planning ongoing

## 2017-07-29 NOTE — PLAN OF CARE
Problem: Patient Care Overview  Goal: Plan of Care Review  Plan of care discussed with patient.  Patient ambulating independently, fall precautions in place. Patient has  complaints of pain. Administered PRN pain meds. Discussed medications and care. Patient has no questions at this time.White board updated. Bed locked in lowest position. Side rails up x2. Will continue to monitor.         Problem: Infection, Risk/Actual (Adult)  Intervention: Prevent Infection/Maximize Resistance  Pt receiving Rocephin IV q 24.

## 2017-07-30 PROCEDURE — 25000003 PHARM REV CODE 250: Performed by: THORACIC SURGERY (CARDIOTHORACIC VASCULAR SURGERY)

## 2017-07-30 PROCEDURE — 25000003 PHARM REV CODE 250: Performed by: NURSE PRACTITIONER

## 2017-07-30 PROCEDURE — 63600175 PHARM REV CODE 636 W HCPCS: Performed by: THORACIC SURGERY (CARDIOTHORACIC VASCULAR SURGERY)

## 2017-07-30 PROCEDURE — 20600001 HC STEP DOWN PRIVATE ROOM

## 2017-07-30 PROCEDURE — A4216 STERILE WATER/SALINE, 10 ML: HCPCS | Performed by: THORACIC SURGERY (CARDIOTHORACIC VASCULAR SURGERY)

## 2017-07-30 RX ADMIN — Medication 10 ML: at 12:07

## 2017-07-30 RX ADMIN — CEFTRIAXONE 2 G: 2 INJECTION, SOLUTION INTRAVENOUS at 08:07

## 2017-07-30 RX ADMIN — NYSTATIN 500000 UNITS: 500000 TABLET, COATED ORAL at 01:07

## 2017-07-30 RX ADMIN — MAGNESIUM OXIDE TAB 400 MG (241.3 MG ELEMENTAL MG) 400 MG: 400 (241.3 MG) TAB at 08:07

## 2017-07-30 RX ADMIN — OXYCODONE HYDROCHLORIDE AND ACETAMINOPHEN 1 TABLET: 5; 325 TABLET ORAL at 08:07

## 2017-07-30 RX ADMIN — FUROSEMIDE 20 MG: 20 TABLET ORAL at 08:07

## 2017-07-30 RX ADMIN — NYSTATIN 500000 UNITS: 500000 TABLET, COATED ORAL at 06:07

## 2017-07-30 RX ADMIN — OXYCODONE HYDROCHLORIDE AND ACETAMINOPHEN 1 TABLET: 5; 325 TABLET ORAL at 05:07

## 2017-07-30 RX ADMIN — POTASSIUM CHLORIDE 20 MEQ: 1500 TABLET, EXTENDED RELEASE ORAL at 09:07

## 2017-07-30 RX ADMIN — METOPROLOL TARTRATE 50 MG: 50 TABLET, FILM COATED ORAL at 08:07

## 2017-07-30 RX ADMIN — OXYCODONE HYDROCHLORIDE AND ACETAMINOPHEN 1 TABLET: 5; 325 TABLET ORAL at 12:07

## 2017-07-30 RX ADMIN — OXYCODONE HYDROCHLORIDE AND ACETAMINOPHEN 1 TABLET: 5; 325 TABLET ORAL at 04:07

## 2017-07-30 RX ADMIN — AMLODIPINE BESYLATE 10 MG: 10 TABLET ORAL at 08:07

## 2017-07-30 RX ADMIN — NYSTATIN 500000 UNITS: 500000 TABLET, COATED ORAL at 10:07

## 2017-07-30 RX ADMIN — Medication 10 ML: at 06:07

## 2017-07-30 RX ADMIN — POTASSIUM CHLORIDE 20 MEQ: 1500 TABLET, EXTENDED RELEASE ORAL at 08:07

## 2017-07-30 RX ADMIN — METHOCARBAMOL 500 MG: 500 TABLET ORAL at 02:07

## 2017-07-30 RX ADMIN — FUROSEMIDE 20 MG: 20 TABLET ORAL at 05:07

## 2017-07-30 RX ADMIN — ASPIRIN 325 MG ORAL TABLET 325 MG: 325 PILL ORAL at 08:07

## 2017-07-30 RX ADMIN — LISINOPRIL 2.5 MG: 2.5 TABLET ORAL at 08:07

## 2017-07-30 RX ADMIN — METHOCARBAMOL 500 MG: 500 TABLET ORAL at 12:07

## 2017-07-30 NOTE — PLAN OF CARE
Problem: Patient Care Overview  Goal: Plan of Care Review  Pt. Remains free from falls/ injury/trauma. No complaints of CP or SOB. PRN pain medication given for pain. Encourage to use sternal precautions and IS. Awaiting LTAC placement.  Plan of Care reviewed with patient. VSS and NADN. Will continue to monitor.

## 2017-07-31 LAB
ANION GAP SERPL CALC-SCNC: 11 MMOL/L
BASOPHILS # BLD AUTO: 0.05 K/UL
BASOPHILS NFR BLD: 0.7 %
BUN SERPL-MCNC: 19 MG/DL
CALCIUM SERPL-MCNC: 9.4 MG/DL
CHLORIDE SERPL-SCNC: 99 MMOL/L
CO2 SERPL-SCNC: 25 MMOL/L
CREAT SERPL-MCNC: 0.9 MG/DL
DIFFERENTIAL METHOD: ABNORMAL
EOSINOPHIL # BLD AUTO: 0.1 K/UL
EOSINOPHIL NFR BLD: 1.9 %
ERYTHROCYTE [DISTWIDTH] IN BLOOD BY AUTOMATED COUNT: 15.5 %
EST. GFR  (AFRICAN AMERICAN): >60 ML/MIN/1.73 M^2
EST. GFR  (NON AFRICAN AMERICAN): >60 ML/MIN/1.73 M^2
GLUCOSE SERPL-MCNC: 96 MG/DL
HCT VFR BLD AUTO: 32.3 %
HGB BLD-MCNC: 10.4 G/DL
LYMPHOCYTES # BLD AUTO: 3.3 K/UL
LYMPHOCYTES NFR BLD: 43.4 %
MAGNESIUM SERPL-MCNC: 1.7 MG/DL
MCH RBC QN AUTO: 26.9 PG
MCHC RBC AUTO-ENTMCNC: 32.2 G/DL
MCV RBC AUTO: 84 FL
MONOCYTES # BLD AUTO: 1 K/UL
MONOCYTES NFR BLD: 13.3 %
NEUTROPHILS # BLD AUTO: 3.1 K/UL
NEUTROPHILS NFR BLD: 40.4 %
PHOSPHATE SERPL-MCNC: 3.7 MG/DL
PLATELET # BLD AUTO: 579 K/UL
PMV BLD AUTO: 8 FL
POTASSIUM SERPL-SCNC: 4.5 MMOL/L
POTASSIUM SERPL-SCNC: 5 MMOL/L
RBC # BLD AUTO: 3.86 M/UL
SODIUM SERPL-SCNC: 135 MMOL/L
WBC # BLD AUTO: 7.54 K/UL

## 2017-07-31 PROCEDURE — 80048 BASIC METABOLIC PNL TOTAL CA: CPT

## 2017-07-31 PROCEDURE — 84132 ASSAY OF SERUM POTASSIUM: CPT

## 2017-07-31 PROCEDURE — 25000003 PHARM REV CODE 250: Performed by: NURSE PRACTITIONER

## 2017-07-31 PROCEDURE — 84100 ASSAY OF PHOSPHORUS: CPT

## 2017-07-31 PROCEDURE — 25000003 PHARM REV CODE 250: Performed by: THORACIC SURGERY (CARDIOTHORACIC VASCULAR SURGERY)

## 2017-07-31 PROCEDURE — 63600175 PHARM REV CODE 636 W HCPCS: Performed by: THORACIC SURGERY (CARDIOTHORACIC VASCULAR SURGERY)

## 2017-07-31 PROCEDURE — A4216 STERILE WATER/SALINE, 10 ML: HCPCS | Performed by: THORACIC SURGERY (CARDIOTHORACIC VASCULAR SURGERY)

## 2017-07-31 PROCEDURE — 20600001 HC STEP DOWN PRIVATE ROOM

## 2017-07-31 PROCEDURE — 85025 COMPLETE CBC W/AUTO DIFF WBC: CPT

## 2017-07-31 PROCEDURE — 25000003 PHARM REV CODE 250: Performed by: STUDENT IN AN ORGANIZED HEALTH CARE EDUCATION/TRAINING PROGRAM

## 2017-07-31 PROCEDURE — 83735 ASSAY OF MAGNESIUM: CPT

## 2017-07-31 RX ORDER — OXYCODONE HYDROCHLORIDE 5 MG/1
5 TABLET ORAL
Status: DISCONTINUED | OUTPATIENT
Start: 2017-07-31 | End: 2017-08-08

## 2017-07-31 RX ADMIN — METHOCARBAMOL 500 MG: 500 TABLET ORAL at 05:07

## 2017-07-31 RX ADMIN — LISINOPRIL 2.5 MG: 2.5 TABLET ORAL at 08:07

## 2017-07-31 RX ADMIN — NYSTATIN 500000 UNITS: 500000 TABLET, COATED ORAL at 05:07

## 2017-07-31 RX ADMIN — METHOCARBAMOL 500 MG: 500 TABLET ORAL at 11:07

## 2017-07-31 RX ADMIN — OXYCODONE HYDROCHLORIDE 5 MG: 5 TABLET ORAL at 11:07

## 2017-07-31 RX ADMIN — CEFTRIAXONE 2 G: 2 INJECTION, SOLUTION INTRAVENOUS at 08:07

## 2017-07-31 RX ADMIN — POTASSIUM CHLORIDE 20 MEQ: 1500 TABLET, EXTENDED RELEASE ORAL at 11:07

## 2017-07-31 RX ADMIN — MAGNESIUM OXIDE TAB 400 MG (241.3 MG ELEMENTAL MG) 400 MG: 400 (241.3 MG) TAB at 08:07

## 2017-07-31 RX ADMIN — NYSTATIN 500000 UNITS: 500000 TABLET, COATED ORAL at 01:07

## 2017-07-31 RX ADMIN — OXYCODONE HYDROCHLORIDE 5 MG: 5 TABLET ORAL at 06:07

## 2017-07-31 RX ADMIN — MAGNESIUM OXIDE TAB 400 MG (241.3 MG ELEMENTAL MG) 400 MG: 400 (241.3 MG) TAB at 09:07

## 2017-07-31 RX ADMIN — TRAMADOL HYDROCHLORIDE 50 MG: 50 TABLET, COATED ORAL at 05:07

## 2017-07-31 RX ADMIN — FUROSEMIDE 20 MG: 20 TABLET ORAL at 08:07

## 2017-07-31 RX ADMIN — METOPROLOL TARTRATE 50 MG: 50 TABLET, FILM COATED ORAL at 08:07

## 2017-07-31 RX ADMIN — METOPROLOL TARTRATE 50 MG: 50 TABLET, FILM COATED ORAL at 09:07

## 2017-07-31 RX ADMIN — NYSTATIN 500000 UNITS: 500000 TABLET, COATED ORAL at 09:07

## 2017-07-31 RX ADMIN — OXYCODONE HYDROCHLORIDE 5 MG: 5 TABLET ORAL at 09:07

## 2017-07-31 RX ADMIN — Medication 10 ML: at 11:07

## 2017-07-31 RX ADMIN — ASPIRIN 325 MG ORAL TABLET 325 MG: 325 PILL ORAL at 08:07

## 2017-07-31 RX ADMIN — AMLODIPINE BESYLATE 10 MG: 10 TABLET ORAL at 08:07

## 2017-07-31 RX ADMIN — FUROSEMIDE 20 MG: 20 TABLET ORAL at 05:07

## 2017-07-31 RX ADMIN — Medication 10 ML: at 06:07

## 2017-07-31 RX ADMIN — Medication 10 ML: at 05:07

## 2017-07-31 RX ADMIN — OXYCODONE HYDROCHLORIDE 5 MG: 5 TABLET ORAL at 02:07

## 2017-07-31 RX ADMIN — OXYCODONE HYDROCHLORIDE AND ACETAMINOPHEN 1 TABLET: 5; 325 TABLET ORAL at 08:07

## 2017-07-31 NOTE — PLAN OF CARE
Problem: Patient Care Overview  Goal: Plan of Care Review  Outcome: Ongoing (interventions implemented as appropriate)  Pt free of falls/traumas/injuries. Skin remains clean, dry, and intact. Rocephin Q 24.  Pt re-educated on importance of measuring accurate intake and out put; pt verbalized and demonstrates understanding. Reviewed plan of care with pt; and pt verbalized understanding. Pt still awaiting approval from LTAC, pt ambulating in valadez with no issues. Pt VSS in no distress will continue to monitor.

## 2017-07-31 NOTE — PLAN OF CARE
Nish covering CTS/TS today 7/31/2017. Covering Nish followed up with [facility], LVM for Shirel in admissions regarding status of requested auth.  Nish followed up with \[facility]\"" LT[facility], LVM for Todd in admissions requesting update on status of auth. Sw sent updated clinicals via [employer] to both facilities. Sw awaiting call back from both LTACs. Sw will continue to follow.       UPDATE 1:52 PM   Covering Nish again contacted Staci in admission [facility], still no answer. Sw left another VM for Shirel. Sw re-contacted \[facility]\"" LT[facility] as well. No answer at time of call. NISH LVM for Todd at \[facility]\"". Sw awaiting call back.     Calista Sadler LMSW

## 2017-07-31 NOTE — PROGRESS NOTES
Progress Note  Cardiothoracic Surgery    Admit Date: 7/19/2017  Post-operative Day: 9 Days Post-Op  Hospital Day: 12    SUBJECTIVE:  No acute issues. SR per monitor.     Follow-up For: Procedure(s) (LRB):  REPLACEMENT-VALVE-AORTIC (N/A)  REPAIR VALVE-MITRAL (N/A)  REPAIR AORTIC ROOT (N/A)  PLACEMENT-NEOPERICARDIUM (N/A)    Scheduled Meds:   amlodipine  10 mg Oral Daily    aspirin  325 mg Oral Daily    cefTRIAXone (ROCEPHIN) IVPB  2 g Intravenous Q24H    furosemide  20 mg Oral BID    lisinopril  2.5 mg Oral Daily    magnesium oxide  400 mg Oral BID    methocarbamol  500 mg Oral QID    metoprolol tartrate  50 mg Oral BID    nicotine  1 patch Transdermal Daily    nystatin  500,000 Units Oral Q8H    potassium chloride SA  20 mEq Oral BID    sodium chloride 0.9%  10 mL Intravenous Q6H     Infusions/Drips:   PRN Meds: dextrose 50%, dextrose 50%, glucagon (human recombinant), glucose, glucose, insulin aspart, lactulose, ondansetron, oxycodone-acetaminophen, pneumoc 13-edith conj-dip cr(PF), Flushing PICC Protocol **AND** sodium chloride 0.9% **AND** sodium chloride 0.9%, tramadol    Review of patient's allergies indicates:  No Known Allergies    OBJECTIVE:     Vital Signs (Most Recent)  Temp: 98.9 °F (37.2 °C) (07/30/17 2000)  Pulse: 91 (07/30/17 2000)  Resp: 18 (07/30/17 2000)  BP: 122/77 (07/30/17 2000)  SpO2: 98 % (07/30/17 1600)    Admission Weight: 68.3 kg (150 lb 9.2 oz) (07/20/17 0059)   Most Recent Weight: 67.9 kg (149 lb 11.1 oz) (07/26/17 0424)    Vital Signs Range (Last 24H):  Temp:  [98 °F (36.7 °C)-98.9 °F (37.2 °C)]   Pulse:  []   Resp:  [14-18]   BP: ()/(54-86)   SpO2:  [98 %-99 %]     I & O (Last 24H):    Intake/Output Summary (Last 24 hours) at 07/30/17 2313  Last data filed at 07/30/17 2228   Gross per 24 hour   Intake             1020 ml   Output                0 ml   Net             1020 ml     Physical Exam:  General: no distress  Head: normocephalic  Lungs:  normal respiratory  effort  Heart: regular rate and rhythm  Abdomen: soft/nontender   Extremities: warm, well perfused  Skin: Skin color, texture, turgor normal. No rashes or lesions    Wound/Incision:  clean, dry, intact      ASSESSMENT/PLAN:     Assessment:   Active Hospital Problems    Diagnosis    *S/P AVR (aortic valve replacement)    Ineffective coping    Hypomagnesemia    Hypokalemia    Adjustment disorder    Opioid use disorder, severe, in early remission, in controlled environment    Amphetamine use disorder, moderate, in early remission    Acute bacterial endocarditis    Subacute bacterial endocarditis    S/P MVR (mitral valve repair)    Stress hyperglycemia    IV drug abuse    Endocarditis       Plan:     -Sternal precautions  -Pt/OT  -Ambulate  -Needs LTAC; will need IV rocephin 2 grams Q 24 hours through   August 31, 2017  -continue lasix  -obtain labs q M-W-F  -monitor electrolytes and replace prn  -monitor H&H and replace prn  -accuchecks QID  -SSI prn  -Discharge planning ongoing

## 2017-07-31 NOTE — PLAN OF CARE
Problem: Physical Therapy Goal  Goal: Physical Therapy Goal  Goals to be met by:      Patient will increase functional independence with mobility by performin. Supine to sit with Stand-by Assistance --MET  2. Sit to supine with Stand-by Assistance-- MET  3. Sit to stand transfer with Stand-by Assistance-- MET  4. Gait  x 100 feet with Stand-by Assistance -- MET  Outcome: Outcome(s) achieved Date Met: 17  Pt has reached satisfactory goal achievement; Pt discharged from acute PT services.     Mayte Bae PT, DPT   2017  Pager: [phone]

## 2017-07-31 NOTE — PROGRESS NOTES
Pt complains of pain; pain medications due in one hour. Notified Dr. Matson per MD adjust to oxycodone 5 mg Q3 hrs PRN. Order implemented will continue to monitor.

## 2017-07-31 NOTE — PT/OT/SLP PROGRESS
Physical Therapy   Progress Note       Kwan Palacio  MRN: [id number]    PT start time: 0825  PT stop time: 0830    Pt chart reviewed. Pt seen by PT ambulating (I) within hallway this AM. Therapist visited pt to address PT POC. Pt reported no concerns for mobility, able to ambulate (I) with no SOB. Pt able to demonstrate understanding of sternal precautions, displays good safety awareness and motivation to maintain endurance by ambulating in hallways. Pt denied any additional acute PT needs or concerns. Pt discharged from PT services 2/2 reaching satisfactory goal achievement. No units billed this date.     Mayte Bae, PT, DPT   7/31/2017  Pager: [phone]

## 2017-07-31 NOTE — PROGRESS NOTES
Progress Note  Cardiothoracic Surgery    Admit Date: 7/19/2017  Post-operative Day: 10 Days Post-Op  Hospital Day: 13    SUBJECTIVE:  No acute issues. SR per monitor.     Follow-up For: Procedure(s) (LRB):  REPLACEMENT-VALVE-AORTIC (N/A)  REPAIR VALVE-MITRAL (N/A)  REPAIR AORTIC ROOT (N/A)  PLACEMENT-NEOPERICARDIUM (N/A)    Scheduled Meds:   amlodipine  10 mg Oral Daily    aspirin  325 mg Oral Daily    cefTRIAXone (ROCEPHIN) IVPB  2 g Intravenous Q24H    furosemide  20 mg Oral BID    lisinopril  2.5 mg Oral Daily    magnesium oxide  400 mg Oral BID    methocarbamol  500 mg Oral QID    metoprolol tartrate  50 mg Oral BID    nicotine  1 patch Transdermal Daily    nystatin  500,000 Units Oral Q8H    potassium chloride SA  20 mEq Oral BID    sodium chloride 0.9%  10 mL Intravenous Q6H     Infusions/Drips:   PRN Meds: dextrose 50%, dextrose 50%, glucagon (human recombinant), glucose, glucose, insulin aspart, lactulose, ondansetron, oxycodone, pneumoc 13-edith conj-dip cr(PF), Flushing PICC Protocol **AND** sodium chloride 0.9% **AND** sodium chloride 0.9%, tramadol    Review of patient's allergies indicates:  No Known Allergies    OBJECTIVE:     Vital Signs (Most Recent)  Temp: 97.9 °F (36.6 °C) (07/31/17 1615)  Pulse: 78 (07/31/17 1615)  Resp: 18 (07/31/17 1615)  BP: 117/63 (07/31/17 1615)  SpO2: 100 % (07/31/17 1615)    Admission Weight: 68.3 kg (150 lb 9.2 oz) (07/20/17 0059)   Most Recent Weight: 67.9 kg (149 lb 11.1 oz) (07/26/17 0424)    Vital Signs Range (Last 24H):  Temp:  [97.5 °F (36.4 °C)-98.9 °F (37.2 °C)]   Pulse:  []   Resp:  [18-19]   BP: (112-125)/(61-84)   SpO2:  [92 %-100 %]     I & O (Last 24H):    Intake/Output Summary (Last 24 hours) at 07/31/17 1807  Last data filed at 07/31/17 1457   Gross per 24 hour   Intake             1440 ml   Output                0 ml   Net             1440 ml     Physical Exam:  General: no distress  Head: normocephalic  Lungs:  normal respiratory  effort  Heart: regular rate and rhythm  Abdomen: soft/nontender   Extremities: warm, well perfused  Skin: Skin color, texture, turgor normal. No rashes or lesions    Wound/Incision:  clean, dry, intact      ASSESSMENT/PLAN:     Assessment:   Active Hospital Problems    Diagnosis    *S/P AVR (aortic valve replacement)    Ineffective coping    Hypomagnesemia    Hypokalemia    Adjustment disorder    Opioid use disorder, severe, in early remission, in controlled environment    Amphetamine use disorder, moderate, in early remission    Acute bacterial endocarditis    Subacute bacterial endocarditis    S/P MVR (mitral valve repair)    Stress hyperglycemia    IV drug abuse    Endocarditis       Plan:   -Sternal precautions  -Pt/OT  -Ambulate  -Needs LTAC; will need IV rocephin 2 grams Q 24 hours through   August 31, 2017  -continue lasix  -obtain labs q M-W-F  -monitor electrolytes and replace prn  -monitor H&H and replace prn  -accuchecks QID  -SSI prn  -Discharge planning ongoing for LTAC

## 2017-08-01 PROCEDURE — 25000003 PHARM REV CODE 250: Performed by: THORACIC SURGERY (CARDIOTHORACIC VASCULAR SURGERY)

## 2017-08-01 PROCEDURE — 25000003 PHARM REV CODE 250: Performed by: NURSE PRACTITIONER

## 2017-08-01 PROCEDURE — 63600175 PHARM REV CODE 636 W HCPCS: Performed by: THORACIC SURGERY (CARDIOTHORACIC VASCULAR SURGERY)

## 2017-08-01 PROCEDURE — 25000003 PHARM REV CODE 250: Performed by: STUDENT IN AN ORGANIZED HEALTH CARE EDUCATION/TRAINING PROGRAM

## 2017-08-01 PROCEDURE — A4216 STERILE WATER/SALINE, 10 ML: HCPCS | Performed by: THORACIC SURGERY (CARDIOTHORACIC VASCULAR SURGERY)

## 2017-08-01 PROCEDURE — 20600001 HC STEP DOWN PRIVATE ROOM

## 2017-08-01 RX ORDER — LOPERAMIDE HYDROCHLORIDE 2 MG/1
2 CAPSULE ORAL ONCE
Status: COMPLETED | OUTPATIENT
Start: 2017-08-01 | End: 2017-08-01

## 2017-08-01 RX ADMIN — NYSTATIN 500000 UNITS: 500000 TABLET, COATED ORAL at 05:08

## 2017-08-01 RX ADMIN — NYSTATIN 500000 UNITS: 500000 TABLET, COATED ORAL at 11:08

## 2017-08-01 RX ADMIN — OXYCODONE HYDROCHLORIDE 5 MG: 5 TABLET ORAL at 04:08

## 2017-08-01 RX ADMIN — OXYCODONE HYDROCHLORIDE 5 MG: 5 TABLET ORAL at 08:08

## 2017-08-01 RX ADMIN — ASPIRIN 325 MG ORAL TABLET 325 MG: 325 PILL ORAL at 08:08

## 2017-08-01 RX ADMIN — Medication 10 ML: at 06:08

## 2017-08-01 RX ADMIN — METOPROLOL TARTRATE 50 MG: 50 TABLET, FILM COATED ORAL at 08:08

## 2017-08-01 RX ADMIN — POTASSIUM CHLORIDE 20 MEQ: 1500 TABLET, EXTENDED RELEASE ORAL at 08:08

## 2017-08-01 RX ADMIN — METHOCARBAMOL 500 MG: 500 TABLET ORAL at 06:08

## 2017-08-01 RX ADMIN — MAGNESIUM OXIDE TAB 400 MG (241.3 MG ELEMENTAL MG) 400 MG: 400 (241.3 MG) TAB at 08:08

## 2017-08-01 RX ADMIN — OXYCODONE HYDROCHLORIDE 5 MG: 5 TABLET ORAL at 07:08

## 2017-08-01 RX ADMIN — OXYCODONE HYDROCHLORIDE 5 MG: 5 TABLET ORAL at 12:08

## 2017-08-01 RX ADMIN — NYSTATIN 500000 UNITS: 500000 TABLET, COATED ORAL at 03:08

## 2017-08-01 RX ADMIN — Medication 10 ML: at 12:08

## 2017-08-01 RX ADMIN — CEFTRIAXONE 2 G: 2 INJECTION, SOLUTION INTRAVENOUS at 08:08

## 2017-08-01 RX ADMIN — FUROSEMIDE 20 MG: 20 TABLET ORAL at 08:08

## 2017-08-01 RX ADMIN — FUROSEMIDE 20 MG: 20 TABLET ORAL at 06:08

## 2017-08-01 RX ADMIN — METHOCARBAMOL 500 MG: 500 TABLET ORAL at 12:08

## 2017-08-01 RX ADMIN — LISINOPRIL 2.5 MG: 2.5 TABLET ORAL at 08:08

## 2017-08-01 RX ADMIN — LOPERAMIDE HYDROCHLORIDE 2 MG: 2 CAPSULE ORAL at 08:08

## 2017-08-01 RX ADMIN — AMLODIPINE BESYLATE 10 MG: 10 TABLET ORAL at 08:08

## 2017-08-01 NOTE — PROGRESS NOTES
Progress Note  Cardiothoracic Surgery    Admit Date: 7/19/2017  Post-operative Day: 11 Days Post-Op  Hospital Day: 14    SUBJECTIVE:  No acute issues. SR per monitor.     Follow-up For: Procedure(s) (LRB):  REPLACEMENT-VALVE-AORTIC (N/A)  REPAIR VALVE-MITRAL (N/A)  REPAIR AORTIC ROOT (N/A)  PLACEMENT-NEOPERICARDIUM (N/A)    Scheduled Meds:   amlodipine  10 mg Oral Daily    aspirin  325 mg Oral Daily    cefTRIAXone (ROCEPHIN) IVPB  2 g Intravenous Q24H    furosemide  20 mg Oral BID    lisinopril  2.5 mg Oral Daily    magnesium oxide  400 mg Oral BID    methocarbamol  500 mg Oral QID    metoprolol tartrate  50 mg Oral BID    nicotine  1 patch Transdermal Daily    nystatin  500,000 Units Oral Q8H    potassium chloride SA  20 mEq Oral BID    sodium chloride 0.9%  10 mL Intravenous Q6H     Infusions/Drips:   PRN Meds: dextrose 50%, dextrose 50%, glucagon (human recombinant), glucose, glucose, insulin aspart, lactulose, ondansetron, oxycodone, pneumoc 13-edith conj-dip cr(PF), Flushing PICC Protocol **AND** sodium chloride 0.9% **AND** sodium chloride 0.9%, tramadol    Review of patient's allergies indicates:  No Known Allergies    OBJECTIVE:     Vital Signs (Most Recent)  Temp: 97.8 °F (36.6 °C) (08/01/17 0745)  Pulse: 84 (08/01/17 1000)  Resp: 16 (08/01/17 0745)  BP: 127/79 (08/01/17 0745)  SpO2: 99 % (08/01/17 0745)    Admission Weight: 68.3 kg (150 lb 9.2 oz) (07/20/17 0059)   Most Recent Weight: 59 kg (130 lb 1.1 oz) (08/01/17 0550)    Vital Signs Range (Last 24H):  Temp:  [97.8 °F (36.6 °C)-98.7 °F (37.1 °C)]   Pulse:  [62-99]   Resp:  [16-20]   BP: (104-128)/(55-79)   SpO2:  [96 %-100 %]     I & O (Last 24H):    Intake/Output Summary (Last 24 hours) at 08/01/17 1305  Last data filed at 08/01/17 0550   Gross per 24 hour   Intake             1080 ml   Output                0 ml   Net             1080 ml     Physical Exam:  General: no distress  Head: normocephalic  Lungs:  normal respiratory effort  Heart:  regular rate and rhythm  Abdomen: soft/nontender   Extremities: warm, well perfused  Skin: Skin color, texture, turgor normal. No rashes or lesions    Wound/Incision:  clean, dry, intact      ASSESSMENT/PLAN:     Assessment:   Active Hospital Problems    Diagnosis    *S/P AVR (aortic valve replacement)    Ineffective coping    Hypomagnesemia    Hypokalemia    Adjustment disorder    Opioid use disorder, severe, in early remission, in controlled environment    Amphetamine use disorder, moderate, in early remission    Acute bacterial endocarditis    Subacute bacterial endocarditis    S/P MVR (mitral valve repair)    Stress hyperglycemia    IV drug abuse    Endocarditis       Plan:   -Sternal precautions  -Pt/OT  -Ambulate  -Needs LTAC; will need IV rocephin 2 grams Q 24 hours through   August 31, 2017  -continue lasix  -obtain labs q M-W-F  -monitor electrolytes and replace prn  -monitor H&H and replace prn  -accuchecks QID  -SSI prn  -Discharge planning ongoing for LTAC, awaiting authorization

## 2017-08-01 NOTE — PLAN OF CARE
Problem: Patient Care Overview  Goal: Plan of Care Review  Discussed Plan of Care with patient.VS stable. Ambulates well independently. Fall precautions in place. Sternal precautions reviewed. Pain controlled by PRN pain medications. IS encouraged. Rocephin q24hr. Patient encouraged to void per urinal to monitor I&Os more accurately. Patient anticipates transfer to LTAC when placement complete. Patient remained free of falls/ injury. All questions and concerns were addressed. Will continue to monitor patient.

## 2017-08-01 NOTE — PLAN OF CARE
Problem: Patient Care Overview  Goal: Plan of Care Review  Outcome: Ongoing (interventions implemented as appropriate)  Patient verbalized pain overnight, relieved with PRN and scheduled pain meds.; denies chest pain, SOB, or other discomfort. Pt remains free of falls or injuries. Awaiting LTAC placement for IV antibiotics. Pt verbalizes complete understanding of plan of care. Will continue to monitor.

## 2017-08-01 NOTE — PLAN OF CARE
George covering CTS/TS today 8/1/2017. Covering George followed up with [facility], LVM for Staci in admissions regarding status of requested auth. No answer at time of call. George called facility again, informed reception that George has tried to reach admissions for 2 days now with no success, George requested to speak to someone regarding Pt's potential admission. George connected to [facility] in admissions. Staci stated auth request has been submitted to Pt's insurance, but she has not yet heard back from the insurance. Staci stated she would follow up with [facility] now and call Sw back shortly.     George followed up with [facility] [phone], spoke to Sharmin in admissions requesting about status of auth. Sharmin stated [facility] still has not heard a response from Pt's insurance, but she will call once a decision has been made. George will continue to follow.    UPDATE 2:35 PM   George re-contacted [facility] [phone], requested admissions. Admissions not available at time of call. [facility] [id number]. George connected to [facility]'s . George LVM for Shirel, requesting update on status of [facility] auth.     Calista Sadler LMSW

## 2017-08-02 LAB
ANION GAP SERPL CALC-SCNC: 9 MMOL/L
BASOPHILS # BLD AUTO: 0.07 K/UL
BASOPHILS NFR BLD: 0.9 %
BUN SERPL-MCNC: 14 MG/DL
C DIFF GDH STL QL: NEGATIVE
C DIFF TOX A+B STL QL IA: NEGATIVE
CALCIUM SERPL-MCNC: 9.2 MG/DL
CHLORIDE SERPL-SCNC: 99 MMOL/L
CO2 SERPL-SCNC: 27 MMOL/L
CREAT SERPL-MCNC: 1 MG/DL
DIFFERENTIAL METHOD: ABNORMAL
EOSINOPHIL # BLD AUTO: 0.2 K/UL
EOSINOPHIL NFR BLD: 2.1 %
ERYTHROCYTE [DISTWIDTH] IN BLOOD BY AUTOMATED COUNT: 15.3 %
EST. GFR  (AFRICAN AMERICAN): >60 ML/MIN/1.73 M^2
EST. GFR  (NON AFRICAN AMERICAN): >60 ML/MIN/1.73 M^2
GLUCOSE SERPL-MCNC: 95 MG/DL
HCT VFR BLD AUTO: 30.3 %
HGB BLD-MCNC: 9.7 G/DL
LYMPHOCYTES # BLD AUTO: 3.9 K/UL
LYMPHOCYTES NFR BLD: 51.5 %
MAGNESIUM SERPL-MCNC: 1.8 MG/DL
MCH RBC QN AUTO: 26.8 PG
MCHC RBC AUTO-ENTMCNC: 32 G/DL
MCV RBC AUTO: 84 FL
MONOCYTES # BLD AUTO: 0.9 K/UL
MONOCYTES NFR BLD: 12.5 %
NEUTROPHILS # BLD AUTO: 2.5 K/UL
NEUTROPHILS NFR BLD: 32.9 %
PHOSPHATE SERPL-MCNC: 4.7 MG/DL
PLATELET # BLD AUTO: 489 K/UL
PMV BLD AUTO: 8.3 FL
POTASSIUM SERPL-SCNC: 4.8 MMOL/L
RBC # BLD AUTO: 3.62 M/UL
SODIUM SERPL-SCNC: 135 MMOL/L
WBC # BLD AUTO: 7.54 K/UL

## 2017-08-02 PROCEDURE — 20600001 HC STEP DOWN PRIVATE ROOM

## 2017-08-02 PROCEDURE — 85025 COMPLETE CBC W/AUTO DIFF WBC: CPT

## 2017-08-02 PROCEDURE — 97803 MED NUTRITION INDIV SUBSEQ: CPT

## 2017-08-02 PROCEDURE — 80048 BASIC METABOLIC PNL TOTAL CA: CPT

## 2017-08-02 PROCEDURE — 84100 ASSAY OF PHOSPHORUS: CPT

## 2017-08-02 PROCEDURE — 25000003 PHARM REV CODE 250: Performed by: NURSE PRACTITIONER

## 2017-08-02 PROCEDURE — 25000003 PHARM REV CODE 250: Performed by: THORACIC SURGERY (CARDIOTHORACIC VASCULAR SURGERY)

## 2017-08-02 PROCEDURE — A4216 STERILE WATER/SALINE, 10 ML: HCPCS | Performed by: THORACIC SURGERY (CARDIOTHORACIC VASCULAR SURGERY)

## 2017-08-02 PROCEDURE — 25000003 PHARM REV CODE 250: Performed by: STUDENT IN AN ORGANIZED HEALTH CARE EDUCATION/TRAINING PROGRAM

## 2017-08-02 PROCEDURE — 87449 NOS EACH ORGANISM AG IA: CPT

## 2017-08-02 PROCEDURE — 63600175 PHARM REV CODE 636 W HCPCS: Performed by: THORACIC SURGERY (CARDIOTHORACIC VASCULAR SURGERY)

## 2017-08-02 PROCEDURE — 83735 ASSAY OF MAGNESIUM: CPT

## 2017-08-02 RX ORDER — LOPERAMIDE HYDROCHLORIDE 2 MG/1
2 CAPSULE ORAL EVERY 12 HOURS PRN
Status: DISCONTINUED | OUTPATIENT
Start: 2017-08-02 | End: 2017-08-08

## 2017-08-02 RX ADMIN — NYSTATIN 500000 UNITS: 500000 TABLET, COATED ORAL at 08:08

## 2017-08-02 RX ADMIN — ASPIRIN 325 MG ORAL TABLET 325 MG: 325 PILL ORAL at 09:08

## 2017-08-02 RX ADMIN — LISINOPRIL 2.5 MG: 2.5 TABLET ORAL at 09:08

## 2017-08-02 RX ADMIN — METOPROLOL TARTRATE 50 MG: 50 TABLET, FILM COATED ORAL at 04:08

## 2017-08-02 RX ADMIN — LOPERAMIDE HYDROCHLORIDE 2 MG: 2 CAPSULE ORAL at 11:08

## 2017-08-02 RX ADMIN — Medication 10 ML: at 12:08

## 2017-08-02 RX ADMIN — AMLODIPINE BESYLATE 10 MG: 10 TABLET ORAL at 09:08

## 2017-08-02 RX ADMIN — METOPROLOL TARTRATE 50 MG: 50 TABLET, FILM COATED ORAL at 08:08

## 2017-08-02 RX ADMIN — OXYCODONE HYDROCHLORIDE 5 MG: 5 TABLET ORAL at 05:08

## 2017-08-02 RX ADMIN — OXYCODONE HYDROCHLORIDE 5 MG: 5 TABLET ORAL at 02:08

## 2017-08-02 RX ADMIN — OXYCODONE HYDROCHLORIDE 5 MG: 5 TABLET ORAL at 08:08

## 2017-08-02 RX ADMIN — OXYCODONE HYDROCHLORIDE 5 MG: 5 TABLET ORAL at 07:08

## 2017-08-02 RX ADMIN — POTASSIUM CHLORIDE 20 MEQ: 1500 TABLET, EXTENDED RELEASE ORAL at 08:08

## 2017-08-02 RX ADMIN — NYSTATIN 500000 UNITS: 500000 TABLET, COATED ORAL at 05:08

## 2017-08-02 RX ADMIN — OXYCODONE HYDROCHLORIDE 5 MG: 5 TABLET ORAL at 11:08

## 2017-08-02 RX ADMIN — OXYCODONE HYDROCHLORIDE 5 MG: 5 TABLET ORAL at 03:08

## 2017-08-02 RX ADMIN — MAGNESIUM OXIDE TAB 400 MG (241.3 MG ELEMENTAL MG) 400 MG: 400 (241.3 MG) TAB at 09:08

## 2017-08-02 RX ADMIN — LOPERAMIDE HYDROCHLORIDE 2 MG: 2 CAPSULE ORAL at 10:08

## 2017-08-02 RX ADMIN — NYSTATIN 500000 UNITS: 500000 TABLET, COATED ORAL at 02:08

## 2017-08-02 RX ADMIN — FUROSEMIDE 20 MG: 20 TABLET ORAL at 09:08

## 2017-08-02 RX ADMIN — OXYCODONE HYDROCHLORIDE 5 MG: 5 TABLET ORAL at 10:08

## 2017-08-02 RX ADMIN — MAGNESIUM OXIDE TAB 400 MG (241.3 MG ELEMENTAL MG) 400 MG: 400 (241.3 MG) TAB at 08:08

## 2017-08-02 RX ADMIN — CEFTRIAXONE 2 G: 2 INJECTION, SOLUTION INTRAVENOUS at 09:08

## 2017-08-02 RX ADMIN — POTASSIUM CHLORIDE 20 MEQ: 1500 TABLET, EXTENDED RELEASE ORAL at 09:08

## 2017-08-02 RX ADMIN — FUROSEMIDE 20 MG: 20 TABLET ORAL at 05:08

## 2017-08-02 NOTE — ASSESSMENT & PLAN NOTE
Nutrition Problem  Increased nutrient needs    Related to (etiology):   Physiological needs- s/p AVR    Signs and Symptoms (as evidenced by):   Chest incision 7/21/17    Interventions/Recommendations (treatment strategy):  See above    Nutrition Diagnosis Status:   Continues

## 2017-08-02 NOTE — PLAN OF CARE
MALKA called Lucio at [phone] to find out where LTAC approval is in process. Lucio connected MALKA to Erin Pimentel [phone] ext [phone] she is not in office this week. MALKA spoke with Chip who gave Damaris ACE's ext [phone]. MALKA left voice mail for her.

## 2017-08-02 NOTE — PLAN OF CARE
Problem: Patient Care Overview  Goal: Plan of Care Review  Outcome: Ongoing (interventions implemented as appropriate)  Plan of care discussed with patient. Patient is free of fall/trauma/injury. Denies CP, SOB, pain controlled with oxycodone. C. Diff negative. All questions addressed. Will continue to monitor.

## 2017-08-02 NOTE — PLAN OF CARE
Problem: Patient Care Overview  Goal: Plan of Care Review  Outcome: Ongoing (interventions implemented as appropriate)  Patient complained of diarrhea, gave one time dose of imodium PO per MD order; Patient denies chest pain, SOB, or other discomfort. Pt remains free of falls or injuries. Awaiting LTAC placement for IV antibiotics. Pt verbalizes complete understanding of plan of care. Will continue to monitor.

## 2017-08-02 NOTE — PROGRESS NOTES
[facility]  Adult Nutrition  Progress Note    SUMMARY     Recommendations    Recommendation/Intervention:   1. Continue current cardiac diet with double portions   2. Order Boost Vanilla BID to aid in nutrient intake   3. RD following  Goals: PO intake > 75% EEN and EPN  Nutrition Goal Status: progressing towards goal  Communication of RD Recs: reviewed with RN        Reason for Assessment    Reason for Assessment: RD follow-up  Diagnosis: cardiac disease  Relevent Medical History: infective endocarditis   Interdisciplinary Rounds: did not attend     General Information Comments: Pt reports good appetite and intake, but recent wt loss. Pt receivng double portions and consuming, but would like additional supplement to gain wt.     Nutrition Discharge Planning: Pt to understand proper diet for condition with optimal intake    Nutrition Prescription Ordered    Current Diet Order: regular, double portions  Nutrition Order Comments: pt happy with double portions and consuming 100%       Evaluation of Received Nutrients/Fluid Intake    Energy Calories Required: meeting needs     Protein Required: meeting needs     Total Fluid Intake (mL/kg): 1035  Fluid Required: meeting needs        % Intake of Estimated Energy Needs: 75 - 100 %  % Meal Intake: 100%     Nutrition Risk Screen     Nutrition Risk Screen: no indicators present    Nutrition/Diet History    Patient Reported Diet/Restrictions/Preferences: other (see comments) (high protein)  Typical Food/Fluid Intake: Adequate PTA  Food Preferences: No cultural or Jehovah's witness preferences noted        Factors Affecting Nutritional Intake:  (-)                Labs/Tests/Procedures/Meds       Pertinent Labs Reviewed: reviewed, pertinent  Pertinent Labs Comments: Neisha 135, P 4.7  Pertinent Medications Reviewed: reviewed, pertinent  Pertinent Medications Comments: lasix, Mg oxide, statin    Physical Findings    Overall Physical Appearance: nourished, loss of muscle  "mass  Tubes:  (chest tube)  Oral/Mouth Cavity: WDL  Skin: incision (in chest)    Anthropometrics    Temp: 97.2 °F (36.2 °C)     Height: 5' 8" (172.7 cm)  Weight Method: Bed Scale  Weight: 58.8 kg (129 lb 10.1 oz)  Ideal Body Weight (IBW), Male: 154 lb     % Ideal Body Weight, Male (lb): 100.35 lb     BMI (Calculated): 23.5  BMI Grade: 18.5-24.9 - normal  Weight Loss: unintentional                         Estimated/Assessed Needs    Weight Used For Calorie Calculations: 67.9 kg (149 lb 11.1 oz)      Energy Calorie Requirements (kcal): 2075  Energy Need Method: Coffey-St Jeor (1.25 PAL)        RMR (Coffey-St. Jeor Equation): 1638.5        Weight Used For Protein Calculations: 67.9 kg (149 lb 11.1 oz)  Protein Requirements: 70-84g (1-1.2g/kg)  Fluid Requirements (mL): per MD  Fluid Need Method: RDA Method (or per MD)           RDA Method (mL): 2075        Assessment and Plan    No new Assessment & Plan notes have been filed under this hospital service since the last note was generated.  Service: Nutrition    Nutrition Problem  Increased nutrient needs    Related to (etiology):   Physiological needs- s/p AVR    Signs and Symptoms (as evidenced by):   Chest incision 7/21/17    Interventions/Recommendations (treatment strategy):  See above    Nutrition Diagnosis Status:   Continues      Monitor and Evaluation    Food and Nutrient Intake: food and beverage intake, energy intake  Food and Nutrient Adminstration: diet order  Knowledge/Beliefs/Attitudes: food and nutrition knowledge/skill, beliefs and attitudes  Physical Activity and Function: nutrition-related ADLs and IADLs  Anthropometric Measurements: weight, weight change, body mass index  Biochemical Data, Medical Tests and Procedures: gastrointestinal profile, inflammatory profile, glucose/endocrine profile, lipid profile, electrolyte and renal panel  Nutrition-Focused Physical Findings: overall appearance    Nutrition Risk    Level of Risk:  (1x/week)    Nutrition " Follow-Up    RD Follow-up?: Yes    Crys Soriano, RD

## 2017-08-02 NOTE — PROGRESS NOTES
Progress Note  Cardiothoracic Surgery    Admit Date: 7/19/2017  Post-operative Day: 12 Days Post-Op  Hospital Day: 15    SUBJECTIVE:  No acute issues. SR per monitor.     Follow-up For: Procedure(s) (LRB):  REPLACEMENT-VALVE-AORTIC (N/A)  REPAIR VALVE-MITRAL (N/A)  REPAIR AORTIC ROOT (N/A)  PLACEMENT-NEOPERICARDIUM (N/A)    Scheduled Meds:   amlodipine  10 mg Oral Daily    aspirin  325 mg Oral Daily    cefTRIAXone (ROCEPHIN) IVPB  2 g Intravenous Q24H    furosemide  20 mg Oral BID    lisinopril  2.5 mg Oral Daily    magnesium oxide  400 mg Oral BID    methocarbamol  500 mg Oral QID    metoprolol tartrate  50 mg Oral BID    nicotine  1 patch Transdermal Daily    nystatin  500,000 Units Oral Q8H    potassium chloride SA  20 mEq Oral BID    sodium chloride 0.9%  10 mL Intravenous Q6H     Infusions/Drips:   PRN Meds: dextrose 50%, dextrose 50%, glucagon (human recombinant), glucose, glucose, insulin aspart, lactulose, loperamide, ondansetron, oxycodone, pneumoc 13-edith conj-dip cr(PF), Flushing PICC Protocol **AND** sodium chloride 0.9% **AND** sodium chloride 0.9%, tramadol    Review of patient's allergies indicates:  No Known Allergies    OBJECTIVE:     Vital Signs (Most Recent)  Temp: 97.2 °F (36.2 °C) (08/02/17 1541)  Pulse: 62 (08/02/17 1541)  Resp: 18 (08/02/17 1541)  BP: (!) 115/58 (08/02/17 1541)  SpO2: 98 % (08/02/17 1541)    Admission Weight: 68.3 kg (150 lb 9.2 oz) (07/20/17 0059)   Most Recent Weight: 58.8 kg (129 lb 10.1 oz) (08/02/17 0438)    Vital Signs Range (Last 24H):  Temp:  [97.2 °F (36.2 °C)-99.1 °F (37.3 °C)]   Pulse:  [62-97]   Resp:  [18]   BP: ()/(51-76)   SpO2:  [96 %-100 %]     I & O (Last 24H):    Intake/Output Summary (Last 24 hours) at 08/02/17 1635  Last data filed at 08/02/17 1400   Gross per 24 hour   Intake             1020 ml   Output                0 ml   Net             1020 ml     Physical Exam:  General: no distress  Head: normocephalic  Lungs:  normal respiratory  effort  Heart: regular rate and rhythm  Abdomen: soft/nontender   Extremities: warm, well perfused  Skin: Skin color, texture, turgor normal. No rashes or lesions    Wound/Incision:  clean, dry, intact      ASSESSMENT/PLAN:     Assessment:   Active Hospital Problems    Diagnosis    *S/P AVR (aortic valve replacement)    Ineffective coping    Hypomagnesemia    Hypokalemia    Adjustment disorder    Opioid use disorder, severe, in early remission, in controlled environment    Amphetamine use disorder, moderate, in early remission    Acute bacterial endocarditis    Subacute bacterial endocarditis    S/P MVR (mitral valve repair)    Stress hyperglycemia    IV drug abuse    Endocarditis     Plan:   -Sternal precautions  -Pt/OT  -Ambulate  -Needs LTAC; will need IV rocephin 2 grams Q 24 hours through   August 31, 2017  -one episode of watery diarrhea, cdiff culture sent- negative   -continue lasix  -obtain labs q M-W-F  -monitor electrolytes and replace prn  -monitor H&H and replace prn  -accuchecks QID  -SSI prn  -Discharge planning ongoing for LTAC, awaiting authorization

## 2017-08-02 NOTE — PLAN OF CARE
Sw covering CTS/TS today 8/2/2017. Covering Sw followed up with [facility], requested to speak to Staci in admissions regarding status of requested auth. Staci stated Pt has been declined. Sw unable to add decline in [employer], as [facility] is a connected provider. Sw followed up with [facility] [phone], LVM for Todd. Sw awaiting call back.      Calista Sadler LMSW

## 2017-08-03 DIAGNOSIS — Z95.2 S/P AVR: Primary | ICD-10-CM

## 2017-08-03 DIAGNOSIS — Z98.890 S/P MVR (MITRAL VALVE REPAIR): ICD-10-CM

## 2017-08-03 PROCEDURE — 25000003 PHARM REV CODE 250: Performed by: NURSE PRACTITIONER

## 2017-08-03 PROCEDURE — A4216 STERILE WATER/SALINE, 10 ML: HCPCS | Performed by: THORACIC SURGERY (CARDIOTHORACIC VASCULAR SURGERY)

## 2017-08-03 PROCEDURE — 25000003 PHARM REV CODE 250: Performed by: THORACIC SURGERY (CARDIOTHORACIC VASCULAR SURGERY)

## 2017-08-03 PROCEDURE — 63600175 PHARM REV CODE 636 W HCPCS: Performed by: THORACIC SURGERY (CARDIOTHORACIC VASCULAR SURGERY)

## 2017-08-03 PROCEDURE — 25000003 PHARM REV CODE 250: Performed by: STUDENT IN AN ORGANIZED HEALTH CARE EDUCATION/TRAINING PROGRAM

## 2017-08-03 PROCEDURE — 20600001 HC STEP DOWN PRIVATE ROOM

## 2017-08-03 RX ORDER — NYSTATIN 100000 [USP'U]/ML
500000 SUSPENSION ORAL EVERY 8 HOURS
Status: DISCONTINUED | OUTPATIENT
Start: 2017-08-03 | End: 2017-08-04

## 2017-08-03 RX ADMIN — NYSTATIN 500000 UNITS: 500000 SUSPENSION ORAL at 09:08

## 2017-08-03 RX ADMIN — Medication 10 ML: at 05:08

## 2017-08-03 RX ADMIN — MAGNESIUM OXIDE TAB 400 MG (241.3 MG ELEMENTAL MG) 400 MG: 400 (241.3 MG) TAB at 09:08

## 2017-08-03 RX ADMIN — FUROSEMIDE 20 MG: 20 TABLET ORAL at 05:08

## 2017-08-03 RX ADMIN — POTASSIUM CHLORIDE 20 MEQ: 1500 TABLET, EXTENDED RELEASE ORAL at 09:08

## 2017-08-03 RX ADMIN — LISINOPRIL 2.5 MG: 2.5 TABLET ORAL at 08:08

## 2017-08-03 RX ADMIN — LOPERAMIDE HYDROCHLORIDE 2 MG: 2 CAPSULE ORAL at 01:08

## 2017-08-03 RX ADMIN — MAGNESIUM OXIDE TAB 400 MG (241.3 MG ELEMENTAL MG) 400 MG: 400 (241.3 MG) TAB at 08:08

## 2017-08-03 RX ADMIN — OXYCODONE HYDROCHLORIDE 5 MG: 5 TABLET ORAL at 07:08

## 2017-08-03 RX ADMIN — OXYCODONE HYDROCHLORIDE 5 MG: 5 TABLET ORAL at 01:08

## 2017-08-03 RX ADMIN — AMLODIPINE BESYLATE 10 MG: 10 TABLET ORAL at 08:08

## 2017-08-03 RX ADMIN — FUROSEMIDE 20 MG: 20 TABLET ORAL at 08:08

## 2017-08-03 RX ADMIN — METOPROLOL TARTRATE 50 MG: 50 TABLET, FILM COATED ORAL at 08:08

## 2017-08-03 RX ADMIN — OXYCODONE HYDROCHLORIDE 5 MG: 5 TABLET ORAL at 03:08

## 2017-08-03 RX ADMIN — OXYCODONE HYDROCHLORIDE 5 MG: 5 TABLET ORAL at 06:08

## 2017-08-03 RX ADMIN — ASPIRIN 325 MG ORAL TABLET 325 MG: 325 PILL ORAL at 08:08

## 2017-08-03 RX ADMIN — NYSTATIN 500000 UNITS: 500000 SUSPENSION ORAL at 01:08

## 2017-08-03 RX ADMIN — POTASSIUM CHLORIDE 20 MEQ: 1500 TABLET, EXTENDED RELEASE ORAL at 08:08

## 2017-08-03 RX ADMIN — CEFTRIAXONE 2 G: 2 INJECTION, SOLUTION INTRAVENOUS at 08:08

## 2017-08-03 RX ADMIN — METOPROLOL TARTRATE 50 MG: 50 TABLET, FILM COATED ORAL at 09:08

## 2017-08-03 RX ADMIN — OXYCODONE HYDROCHLORIDE 5 MG: 5 TABLET ORAL at 09:08

## 2017-08-03 RX ADMIN — OXYCODONE HYDROCHLORIDE 5 MG: 5 TABLET ORAL at 04:08

## 2017-08-03 RX ADMIN — NYSTATIN 500000 UNITS: 500000 SUSPENSION ORAL at 05:08

## 2017-08-03 RX ADMIN — Medication 10 ML: at 12:08

## 2017-08-03 NOTE — PLAN OF CARE
MALKA called Yvonne with [facility] who stated spoke with Damaris at [facility] and they are working out contract. Yvonne states contract most likely will be ready tomorrow. Also beds are tight. CM will update team and SW.

## 2017-08-03 NOTE — PROGRESS NOTES
Progress Note  Cardiothoracic Surgery    Admit Date: 7/19/2017  Post-operative Day: 13 Days Post-Op  Hospital Day: 16    SUBJECTIVE:  No acute issues. SR per monitor.     Follow-up For: Procedure(s) (LRB):  REPLACEMENT-VALVE-AORTIC (N/A)  REPAIR VALVE-MITRAL (N/A)  REPAIR AORTIC ROOT (N/A)  PLACEMENT-NEOPERICARDIUM (N/A)    Scheduled Meds:   amlodipine  10 mg Oral Daily    aspirin  325 mg Oral Daily    cefTRIAXone (ROCEPHIN) IVPB  2 g Intravenous Q24H    furosemide  20 mg Oral BID    lisinopril  2.5 mg Oral Daily    magnesium oxide  400 mg Oral BID    methocarbamol  500 mg Oral QID    metoprolol tartrate  50 mg Oral BID    nicotine  1 patch Transdermal Daily    nystatin  500,000 Units Oral Q8H    potassium chloride SA  20 mEq Oral BID    sodium chloride 0.9%  10 mL Intravenous Q6H     Infusions/Drips:   PRN Meds: dextrose 50%, dextrose 50%, glucagon (human recombinant), glucose, glucose, insulin aspart, lactulose, loperamide, ondansetron, oxycodone, pneumoc 13-edith conj-dip cr(PF), Flushing PICC Protocol **AND** sodium chloride 0.9% **AND** sodium chloride 0.9%, tramadol    Review of patient's allergies indicates:  No Known Allergies    OBJECTIVE:     Vital Signs (Most Recent)  Temp: 97.9 °F (36.6 °C) (08/03/17 1100)  Pulse: 70 (08/03/17 1100)  Resp: 18 (08/03/17 1100)  BP: (!) 101/52 (08/03/17 1100)  SpO2: 100 % (08/03/17 1100)    Admission Weight: 68.3 kg (150 lb 9.2 oz) (07/20/17 0059)   Most Recent Weight: 59 kg (130 lb 1.1 oz) (08/03/17 6454)    Vital Signs Range (Last 24H):  Temp:  [97.2 °F (36.2 °C)-98.9 °F (37.2 °C)]   Pulse:  [62-95]   Resp:  [18]   BP: ()/(52-70)   SpO2:  [97 %-100 %]     I & O (Last 24H):    Intake/Output Summary (Last 24 hours) at 08/03/17 1508  Last data filed at 08/03/17 1400   Gross per 24 hour   Intake             2140 ml   Output                0 ml   Net             2140 ml     Physical Exam:  General: no distress  Head: normocephalic  Lungs:  normal respiratory  effort  Heart: regular rate and rhythm  Abdomen: soft/nontender   Extremities: warm, well perfused  Skin: Skin color, texture, turgor normal. No rashes or lesions    Wound/Incision:  clean, dry, intact      ASSESSMENT/PLAN:     Assessment:   Active Hospital Problems    Diagnosis    *S/P AVR (aortic valve replacement)    Ineffective coping    Hypomagnesemia    Hypokalemia    Adjustment disorder    Opioid use disorder, severe, in early remission, in controlled environment    Amphetamine use disorder, moderate, in early remission    Acute bacterial endocarditis    Subacute bacterial endocarditis    S/P MVR (mitral valve repair)    Stress hyperglycemia    IV drug abuse    Endocarditis       Plan:   -Sternal precautions  -Pt/OT  -Ambulate  -Needs LTAC; will need IV rocephin 2 grams Q 24 hours through   August 31, 2017  -continue lasix  -obtain labs q M-W-F  -monitor electrolytes and replace prn  -monitor H&H and replace prn  -accuchecks QID  -SSI prn  -Discharge planning ongoing for LTAC, hoping for tomorrow

## 2017-08-03 NOTE — PLAN OF CARE
MALKA received phone call from Damaris at [facility] [phone]. MALKA informed her pt has been denied by 3 in network LTAC's ([facility], [facility], [facility]) CM gave her Yvonne's phone number for [facility]. Pt hopefully will  dc today if contract with [facility] in place. Will cont to follow.

## 2017-08-03 NOTE — PLAN OF CARE
Problem: Patient Care Overview  Goal: Plan of Care Review  Outcome: Ongoing (interventions implemented as appropriate)  Patient complained of pain overnight, relieved with PRN meds; denies chest pain, SOB, or other discomfort. Pt remains free of falls or injuries. Awaiting LTAC placement for IV antibiotics. Pt verbalizes complete understanding of plan of care. Will continue to monitor.    .

## 2017-08-03 NOTE — PLAN OF CARE
Sw covering CTS/TS today 8/3/2017. George informed by CM that Pt may go to [facility] today, if they receive auth from Pt's insurance. George ready to help orchestrate d/c. D/c pending auth.    Calista Sadler, SW [phone]

## 2017-08-04 LAB
ANION GAP SERPL CALC-SCNC: 12 MMOL/L
ANISOCYTOSIS BLD QL SMEAR: SLIGHT
BASOPHILS # BLD AUTO: 0.04 K/UL
BASOPHILS NFR BLD: 0.5 %
BUN SERPL-MCNC: 20 MG/DL
CALCIUM SERPL-MCNC: 9.8 MG/DL
CHLORIDE SERPL-SCNC: 102 MMOL/L
CO2 SERPL-SCNC: 24 MMOL/L
CREAT SERPL-MCNC: 1 MG/DL
DIFFERENTIAL METHOD: ABNORMAL
EOSINOPHIL # BLD AUTO: 0.1 K/UL
EOSINOPHIL NFR BLD: 1.5 %
ERYTHROCYTE [DISTWIDTH] IN BLOOD BY AUTOMATED COUNT: 15.4 %
EST. GFR  (AFRICAN AMERICAN): >60 ML/MIN/1.73 M^2
EST. GFR  (NON AFRICAN AMERICAN): >60 ML/MIN/1.73 M^2
GLUCOSE SERPL-MCNC: 115 MG/DL
HCT VFR BLD AUTO: 30.6 %
HGB BLD-MCNC: 9.9 G/DL
LYMPHOCYTES # BLD AUTO: 4.4 K/UL
LYMPHOCYTES NFR BLD: 49.7 %
MAGNESIUM SERPL-MCNC: 1.9 MG/DL
MCH RBC QN AUTO: 26.3 PG
MCHC RBC AUTO-ENTMCNC: 32.4 G/DL
MCV RBC AUTO: 81 FL
MONOCYTES # BLD AUTO: 0.8 K/UL
MONOCYTES NFR BLD: 9.2 %
NEUTROPHILS # BLD AUTO: 3.4 K/UL
NEUTROPHILS NFR BLD: 39.1 %
PHOSPHATE SERPL-MCNC: 5 MG/DL
PLATELET # BLD AUTO: 538 K/UL
PLATELET BLD QL SMEAR: ABNORMAL
PMV BLD AUTO: 8.2 FL
POLYCHROMASIA BLD QL SMEAR: ABNORMAL
POTASSIUM SERPL-SCNC: 4.4 MMOL/L
RBC # BLD AUTO: 3.76 M/UL
SODIUM SERPL-SCNC: 138 MMOL/L
WBC # BLD AUTO: 8.76 K/UL

## 2017-08-04 PROCEDURE — 80048 BASIC METABOLIC PNL TOTAL CA: CPT

## 2017-08-04 PROCEDURE — 83735 ASSAY OF MAGNESIUM: CPT

## 2017-08-04 PROCEDURE — 25000003 PHARM REV CODE 250: Performed by: STUDENT IN AN ORGANIZED HEALTH CARE EDUCATION/TRAINING PROGRAM

## 2017-08-04 PROCEDURE — 85025 COMPLETE CBC W/AUTO DIFF WBC: CPT

## 2017-08-04 PROCEDURE — 25000003 PHARM REV CODE 250: Performed by: NURSE PRACTITIONER

## 2017-08-04 PROCEDURE — 63600175 PHARM REV CODE 636 W HCPCS: Performed by: THORACIC SURGERY (CARDIOTHORACIC VASCULAR SURGERY)

## 2017-08-04 PROCEDURE — 84100 ASSAY OF PHOSPHORUS: CPT

## 2017-08-04 PROCEDURE — 20600001 HC STEP DOWN PRIVATE ROOM

## 2017-08-04 PROCEDURE — 25000003 PHARM REV CODE 250: Performed by: THORACIC SURGERY (CARDIOTHORACIC VASCULAR SURGERY)

## 2017-08-04 RX ORDER — FLUCONAZOLE 200 MG/1
200 TABLET ORAL DAILY
Status: DISCONTINUED | OUTPATIENT
Start: 2017-08-04 | End: 2017-08-08

## 2017-08-04 RX ORDER — TRAMADOL HYDROCHLORIDE 50 MG/1
50 TABLET ORAL EVERY 4 HOURS PRN
Status: DISCONTINUED | OUTPATIENT
Start: 2017-08-04 | End: 2017-08-04

## 2017-08-04 RX ORDER — METHOCARBAMOL 500 MG/1
500 TABLET, FILM COATED ORAL 4 TIMES DAILY
Status: DISCONTINUED | OUTPATIENT
Start: 2017-08-04 | End: 2017-08-08

## 2017-08-04 RX ORDER — NYSTATIN 500000 [USP'U]/1
500000 TABLET, COATED ORAL EVERY 8 HOURS
Status: DISCONTINUED | OUTPATIENT
Start: 2017-08-04 | End: 2017-08-04

## 2017-08-04 RX ADMIN — MAGNESIUM OXIDE TAB 400 MG (241.3 MG ELEMENTAL MG) 400 MG: 400 (241.3 MG) TAB at 08:08

## 2017-08-04 RX ADMIN — Medication 10 ML: at 04:08

## 2017-08-04 RX ADMIN — LISINOPRIL 2.5 MG: 2.5 TABLET ORAL at 08:08

## 2017-08-04 RX ADMIN — FUROSEMIDE 20 MG: 20 TABLET ORAL at 05:08

## 2017-08-04 RX ADMIN — MAGNESIUM OXIDE TAB 400 MG (241.3 MG ELEMENTAL MG) 400 MG: 400 (241.3 MG) TAB at 07:08

## 2017-08-04 RX ADMIN — METOPROLOL TARTRATE 50 MG: 50 TABLET, FILM COATED ORAL at 07:08

## 2017-08-04 RX ADMIN — TRAMADOL HYDROCHLORIDE 50 MG: 50 TABLET, COATED ORAL at 05:08

## 2017-08-04 RX ADMIN — NYSTATIN 500000 UNITS: 500000 TABLET, COATED ORAL at 05:08

## 2017-08-04 RX ADMIN — ASPIRIN 325 MG ORAL TABLET 325 MG: 325 PILL ORAL at 08:08

## 2017-08-04 RX ADMIN — TRAMADOL HYDROCHLORIDE 50 MG: 50 TABLET, COATED ORAL at 08:08

## 2017-08-04 RX ADMIN — METHOCARBAMOL 500 MG: 500 TABLET ORAL at 09:08

## 2017-08-04 RX ADMIN — FUROSEMIDE 20 MG: 20 TABLET ORAL at 08:08

## 2017-08-04 RX ADMIN — AMLODIPINE BESYLATE 10 MG: 10 TABLET ORAL at 08:08

## 2017-08-04 RX ADMIN — POTASSIUM CHLORIDE 20 MEQ: 1500 TABLET, EXTENDED RELEASE ORAL at 07:08

## 2017-08-04 RX ADMIN — OXYCODONE HYDROCHLORIDE 5 MG: 5 TABLET ORAL at 07:08

## 2017-08-04 RX ADMIN — OXYCODONE HYDROCHLORIDE 5 MG: 5 TABLET ORAL at 04:08

## 2017-08-04 RX ADMIN — CEFTRIAXONE 2 G: 2 INJECTION, SOLUTION INTRAVENOUS at 08:08

## 2017-08-04 RX ADMIN — Medication 10 ML: at 03:08

## 2017-08-04 RX ADMIN — NYSTATIN 500000 UNITS: 500000 TABLET, COATED ORAL at 10:08

## 2017-08-04 RX ADMIN — POTASSIUM CHLORIDE 20 MEQ: 1500 TABLET, EXTENDED RELEASE ORAL at 08:08

## 2017-08-04 RX ADMIN — METHOCARBAMOL 500 MG: 500 TABLET ORAL at 11:08

## 2017-08-04 RX ADMIN — METHOCARBAMOL 500 MG: 500 TABLET ORAL at 05:08

## 2017-08-04 RX ADMIN — METOPROLOL TARTRATE 50 MG: 50 TABLET, FILM COATED ORAL at 08:08

## 2017-08-04 RX ADMIN — FLUCONAZOLE 200 MG: 200 TABLET ORAL at 10:08

## 2017-08-04 RX ADMIN — OXYCODONE HYDROCHLORIDE 5 MG: 5 TABLET ORAL at 11:08

## 2017-08-04 NOTE — PLAN OF CARE
MALKA received phone call from Damaris at [facility] stating they are still trying to negotiate contract. She stated that if pt has complaints he can call the 1-800 # on his insurance card. CM attempt to see pt and inform him. His lights are off in his room. CM informed pt's nurse to update him. MALKA also called Margaret SPANN to update her.

## 2017-08-04 NOTE — PLAN OF CARE
Problem: Patient Care Overview  Goal: Plan of Care Review  Outcome: Ongoing (interventions implemented as appropriate)  Pt verbalizes no complaints. Denies CP, SOB, or other discomforts. Pt receiving IV Abx for infection. PT given one dose of diflucan for thrush. Plan is for pt to be dc'd to LTAC once approved.   Pt remains free of fall or injury. Pt verbalizes understanding of plan of care. Will continue to monitor.

## 2017-08-04 NOTE — PLAN OF CARE
MALKA called Yvonne with [facility]. Yvonne states pt has been approved but awaiting on contract. MALKA asked if she would have a bed today. She said beds are tight, possibly have 1 male bed. She will call MALKA with updates.

## 2017-08-04 NOTE — PLAN OF CARE
CM spoke with pt's nurse stating pt would like to speak to CM. Pt is upset about not being able to transfer.     1:10pm CM called and left message for Damaris at [facility] to inquire about contract.     1:15pm CM spoke with pt and explained to him that [facility] is not in network with his insurance. CM informed him we had to get 3 denials from his in network LTAC's for insurance to  get a contract with [facility]. CM explained has called Damaris with [facility] and left messages. And also spoken with Yvonne/[facility] and has not heard anything yet. Pt wanting to speak with someone at his insurance. CM gave him Damaris's number and extension. CM informed him will update him if I hear anything.

## 2017-08-04 NOTE — PLAN OF CARE
Problem: Patient Care Overview  Goal: Plan of Care Review  Outcome: Ongoing (interventions implemented as appropriate)  Patient remains free of falls or injury. Patient complains of pain; treated with PO pain medication. Continued on rocephin q24 hours. Awaiting LTAC placement; possible d/c in AM. Plan of care reviewed with patient.

## 2017-08-05 PROBLEM — I38 ENDOCARDITIS: Status: RESOLVED | Noted: 2017-07-19 | Resolved: 2017-08-05

## 2017-08-05 PROCEDURE — 25000003 PHARM REV CODE 250: Performed by: THORACIC SURGERY (CARDIOTHORACIC VASCULAR SURGERY)

## 2017-08-05 PROCEDURE — A4216 STERILE WATER/SALINE, 10 ML: HCPCS | Performed by: THORACIC SURGERY (CARDIOTHORACIC VASCULAR SURGERY)

## 2017-08-05 PROCEDURE — 25000003 PHARM REV CODE 250: Performed by: STUDENT IN AN ORGANIZED HEALTH CARE EDUCATION/TRAINING PROGRAM

## 2017-08-05 PROCEDURE — 63600175 PHARM REV CODE 636 W HCPCS: Performed by: THORACIC SURGERY (CARDIOTHORACIC VASCULAR SURGERY)

## 2017-08-05 PROCEDURE — 25000003 PHARM REV CODE 250: Performed by: NURSE PRACTITIONER

## 2017-08-05 PROCEDURE — 20600001 HC STEP DOWN PRIVATE ROOM

## 2017-08-05 RX ADMIN — POTASSIUM CHLORIDE 20 MEQ: 1500 TABLET, EXTENDED RELEASE ORAL at 09:08

## 2017-08-05 RX ADMIN — METHOCARBAMOL 500 MG: 500 TABLET ORAL at 05:08

## 2017-08-05 RX ADMIN — Medication 10 ML: at 12:08

## 2017-08-05 RX ADMIN — FUROSEMIDE 20 MG: 20 TABLET ORAL at 05:08

## 2017-08-05 RX ADMIN — OXYCODONE HYDROCHLORIDE 5 MG: 5 TABLET ORAL at 08:08

## 2017-08-05 RX ADMIN — Medication 10 ML: at 11:08

## 2017-08-05 RX ADMIN — FLUCONAZOLE 200 MG: 200 TABLET ORAL at 08:08

## 2017-08-05 RX ADMIN — ASPIRIN 325 MG ORAL TABLET 325 MG: 325 PILL ORAL at 08:08

## 2017-08-05 RX ADMIN — Medication 10 ML: at 06:08

## 2017-08-05 RX ADMIN — OXYCODONE HYDROCHLORIDE 5 MG: 5 TABLET ORAL at 04:08

## 2017-08-05 RX ADMIN — POTASSIUM CHLORIDE 20 MEQ: 1500 TABLET, EXTENDED RELEASE ORAL at 08:08

## 2017-08-05 RX ADMIN — FUROSEMIDE 20 MG: 20 TABLET ORAL at 08:08

## 2017-08-05 RX ADMIN — METOPROLOL TARTRATE 50 MG: 50 TABLET, FILM COATED ORAL at 08:08

## 2017-08-05 RX ADMIN — METOPROLOL TARTRATE 50 MG: 50 TABLET, FILM COATED ORAL at 09:08

## 2017-08-05 RX ADMIN — Medication 10 ML: at 01:08

## 2017-08-05 RX ADMIN — LOPERAMIDE HYDROCHLORIDE 2 MG: 2 CAPSULE ORAL at 11:08

## 2017-08-05 RX ADMIN — MAGNESIUM OXIDE TAB 400 MG (241.3 MG ELEMENTAL MG) 400 MG: 400 (241.3 MG) TAB at 08:08

## 2017-08-05 RX ADMIN — OXYCODONE HYDROCHLORIDE 5 MG: 5 TABLET ORAL at 11:08

## 2017-08-05 RX ADMIN — CEFTRIAXONE 2 G: 2 INJECTION, SOLUTION INTRAVENOUS at 08:08

## 2017-08-05 RX ADMIN — MAGNESIUM OXIDE TAB 400 MG (241.3 MG ELEMENTAL MG) 400 MG: 400 (241.3 MG) TAB at 09:08

## 2017-08-05 RX ADMIN — AMLODIPINE BESYLATE 10 MG: 10 TABLET ORAL at 08:08

## 2017-08-05 RX ADMIN — OXYCODONE HYDROCHLORIDE 5 MG: 5 TABLET ORAL at 03:08

## 2017-08-05 RX ADMIN — Medication 10 ML: at 05:08

## 2017-08-05 RX ADMIN — LISINOPRIL 2.5 MG: 2.5 TABLET ORAL at 08:08

## 2017-08-05 NOTE — PLAN OF CARE
Problem: Patient Care Overview  Goal: Plan of Care Review  Outcome: Ongoing (interventions implemented as appropriate)  Pt still waiting for LTAC placement.  Cont on IV Rocephin.  Will cont to monitor.

## 2017-08-05 NOTE — PLAN OF CARE
Problem: Patient Care Overview  Goal: Individualization & Mutuality  DX: Bacterial Endocarditis    Hx: L shoulder dislocation    Sx: 7/21 Aortic valve replacement,mitral valve repair,aortic root repair,neoparicardial placement     Nursing  - MAP <90   Outcome: Ongoing (interventions implemented as appropriate)  POC reviewed with pt. VS stable. IV ABx administered as scheduled. Pt remains free from falls, trauma, injury; pt tolerating POC well. Possible d/c Monday to LTAC pending insurance. Will continue to monitor.

## 2017-08-05 NOTE — PROGRESS NOTES
Progress Note  Cardiothoracic Surgery    Admit Date: 7/19/2017  Post-operative Day: 15 Days Post-Op  Hospital Day: 18    SUBJECTIVE:  No acute issues. SR per monitor.     Follow-up For: Procedure(s) (LRB):  REPLACEMENT-VALVE-AORTIC (N/A)  REPAIR VALVE-MITRAL (N/A)  REPAIR AORTIC ROOT (N/A)  PLACEMENT-NEOPERICARDIUM (N/A)    Scheduled Meds:   amlodipine  10 mg Oral Daily    aspirin  325 mg Oral Daily    cefTRIAXone (ROCEPHIN) IVPB  2 g Intravenous Q24H    fluconazole  200 mg Oral Daily    furosemide  20 mg Oral BID    lisinopril  2.5 mg Oral Daily    magnesium oxide  400 mg Oral BID    methocarbamol  500 mg Oral QID    metoprolol tartrate  50 mg Oral BID    nicotine  1 patch Transdermal Daily    potassium chloride SA  20 mEq Oral BID    sodium chloride 0.9%  10 mL Intravenous Q6H     Infusions/Drips:   PRN Meds: (Magic mouthwash) 1:1:1 Benadryl 12.5mg/5ml liq, aluminum & magnesium hydroxide-simehticone (Maalox), lidocaine viscous 2%, dextrose 50%, dextrose 50%, glucagon (human recombinant), glucose, glucose, insulin aspart, lactulose, loperamide, ondansetron, oxycodone, pneumoc 13-edith conj-dip cr(PF), Flushing PICC Protocol **AND** sodium chloride 0.9% **AND** sodium chloride 0.9%, tramadol    Review of patient's allergies indicates:  No Known Allergies    OBJECTIVE:     Vital Signs (Most Recent)  Temp: 97.8 °F (36.6 °C) (08/05/17 0812)  Pulse: 79 (08/05/17 0812)  Resp: 18 (08/05/17 0812)  BP: 122/72 (08/05/17 0812)  SpO2: 99 % (08/05/17 0812)    Admission Weight: 68.3 kg (150 lb 9.2 oz) (07/20/17 0059)   Most Recent Weight: 58.6 kg (129 lb 3 oz) (08/05/17 0449)    Vital Signs Range (Last 24H):  Temp:  [97.8 °F (36.6 °C)-98.8 °F (37.1 °C)]   Pulse:  [65-99]   Resp:  [16-18]   BP: (105-142)/(56-72)   SpO2:  [97 %-99 %]     I & O (Last 24H):    Intake/Output Summary (Last 24 hours) at 08/05/17 1105  Last data filed at 08/05/17 0449   Gross per 24 hour   Intake             1640 ml   Output                0  ml   Net             1640 ml     Physical Exam:  General: no distress  Head: normocephalic  Lungs:  normal respiratory effort  Heart: regular rate and rhythm  Abdomen: soft/nontender   Extremities: warm, well perfused  Skin: Skin color, texture, turgor normal. No rashes or lesions    Wound/Incision:  clean, dry, intact      ASSESSMENT/PLAN:     Assessment:   Active Hospital Problems    Diagnosis    *S/P AVR (aortic valve replacement)    Ineffective coping    Hypomagnesemia    Hypokalemia    Adjustment disorder    Opioid use disorder, severe, in early remission, in controlled environment    Amphetamine use disorder, moderate, in early remission    S/P MVR (mitral valve repair)    Stress hyperglycemia    IV drug abuse       Plan:   -Sternal precautions  -Pt/OT  -Ambulate  -going to LTAC; will need IV rocephin 2 grams Q 24 hours through   August 31, 2017  -continue lasix  -obtain labs q M-W-F  -monitor electrolytes and replace prn  -monitor H&H and replace prn  -accuchecks QID  -SSI prn  -Discharge planning ongoing for LTAC, hoping for Monday due to insurance issues

## 2017-08-06 PROCEDURE — A4216 STERILE WATER/SALINE, 10 ML: HCPCS | Performed by: THORACIC SURGERY (CARDIOTHORACIC VASCULAR SURGERY)

## 2017-08-06 PROCEDURE — 25000003 PHARM REV CODE 250: Performed by: NURSE PRACTITIONER

## 2017-08-06 PROCEDURE — 20600001 HC STEP DOWN PRIVATE ROOM

## 2017-08-06 PROCEDURE — 63600175 PHARM REV CODE 636 W HCPCS: Performed by: THORACIC SURGERY (CARDIOTHORACIC VASCULAR SURGERY)

## 2017-08-06 PROCEDURE — 25000003 PHARM REV CODE 250: Performed by: STUDENT IN AN ORGANIZED HEALTH CARE EDUCATION/TRAINING PROGRAM

## 2017-08-06 PROCEDURE — 25000003 PHARM REV CODE 250: Performed by: THORACIC SURGERY (CARDIOTHORACIC VASCULAR SURGERY)

## 2017-08-06 RX ADMIN — AMLODIPINE BESYLATE 10 MG: 10 TABLET ORAL at 08:08

## 2017-08-06 RX ADMIN — OXYCODONE HYDROCHLORIDE 5 MG: 5 TABLET ORAL at 12:08

## 2017-08-06 RX ADMIN — MAGNESIUM OXIDE TAB 400 MG (241.3 MG ELEMENTAL MG) 400 MG: 400 (241.3 MG) TAB at 09:08

## 2017-08-06 RX ADMIN — OXYCODONE HYDROCHLORIDE 5 MG: 5 TABLET ORAL at 05:08

## 2017-08-06 RX ADMIN — Medication 10 ML: at 05:08

## 2017-08-06 RX ADMIN — FUROSEMIDE 20 MG: 20 TABLET ORAL at 08:08

## 2017-08-06 RX ADMIN — OXYCODONE HYDROCHLORIDE 5 MG: 5 TABLET ORAL at 08:08

## 2017-08-06 RX ADMIN — LISINOPRIL 2.5 MG: 2.5 TABLET ORAL at 08:08

## 2017-08-06 RX ADMIN — MAGNESIUM OXIDE TAB 400 MG (241.3 MG ELEMENTAL MG) 400 MG: 400 (241.3 MG) TAB at 08:08

## 2017-08-06 RX ADMIN — POTASSIUM CHLORIDE 20 MEQ: 1500 TABLET, EXTENDED RELEASE ORAL at 08:08

## 2017-08-06 RX ADMIN — METOPROLOL TARTRATE 50 MG: 50 TABLET, FILM COATED ORAL at 09:08

## 2017-08-06 RX ADMIN — LOPERAMIDE HYDROCHLORIDE 2 MG: 2 CAPSULE ORAL at 09:08

## 2017-08-06 RX ADMIN — ASPIRIN 325 MG ORAL TABLET 325 MG: 325 PILL ORAL at 08:08

## 2017-08-06 RX ADMIN — LOPERAMIDE HYDROCHLORIDE 2 MG: 2 CAPSULE ORAL at 03:08

## 2017-08-06 RX ADMIN — LOPERAMIDE HYDROCHLORIDE 2 MG: 2 CAPSULE ORAL at 08:08

## 2017-08-06 RX ADMIN — OXYCODONE HYDROCHLORIDE 5 MG: 5 TABLET ORAL at 09:08

## 2017-08-06 RX ADMIN — TRAMADOL HYDROCHLORIDE 50 MG: 50 TABLET, COATED ORAL at 01:08

## 2017-08-06 RX ADMIN — CEFTRIAXONE 2 G: 2 INJECTION, SOLUTION INTRAVENOUS at 09:08

## 2017-08-06 RX ADMIN — Medication 10 ML: at 12:08

## 2017-08-06 RX ADMIN — FLUCONAZOLE 200 MG: 200 TABLET ORAL at 08:08

## 2017-08-06 RX ADMIN — METOPROLOL TARTRATE 50 MG: 50 TABLET, FILM COATED ORAL at 08:08

## 2017-08-06 RX ADMIN — POTASSIUM CHLORIDE 20 MEQ: 1500 TABLET, EXTENDED RELEASE ORAL at 09:08

## 2017-08-06 RX ADMIN — OXYCODONE HYDROCHLORIDE 5 MG: 5 TABLET ORAL at 03:08

## 2017-08-06 RX ADMIN — FUROSEMIDE 20 MG: 20 TABLET ORAL at 05:08

## 2017-08-06 NOTE — PROGRESS NOTES
Progress Note  Cardiothoracic Surgery    Admit Date: 7/19/2017  Post-operative Day: 16 Days Post-Op  Hospital Day: 19    SUBJECTIVE:  NAEON. Afebrile. Pain controlled. No new issues.     Follow-up For: Procedure(s) (LRB):  REPLACEMENT-VALVE-AORTIC (N/A)  REPAIR VALVE-MITRAL (N/A)  REPAIR AORTIC ROOT (N/A)  PLACEMENT-NEOPERICARDIUM (N/A)    Scheduled Meds:   amlodipine  10 mg Oral Daily    aspirin  325 mg Oral Daily    cefTRIAXone (ROCEPHIN) IVPB  2 g Intravenous Q24H    fluconazole  200 mg Oral Daily    furosemide  20 mg Oral BID    lisinopril  2.5 mg Oral Daily    magnesium oxide  400 mg Oral BID    methocarbamol  500 mg Oral QID    metoprolol tartrate  50 mg Oral BID    nicotine  1 patch Transdermal Daily    potassium chloride SA  20 mEq Oral BID    sodium chloride 0.9%  10 mL Intravenous Q6H     Infusions/Drips:   PRN Meds: (Magic mouthwash) 1:1:1 Benadryl 12.5mg/5ml liq, aluminum & magnesium hydroxide-simehticone (Maalox), lidocaine viscous 2%, dextrose 50%, dextrose 50%, glucagon (human recombinant), glucose, glucose, insulin aspart, lactulose, loperamide, ondansetron, oxycodone, pneumoc 13-edith conj-dip cr(PF), Flushing PICC Protocol **AND** sodium chloride 0.9% **AND** sodium chloride 0.9%, tramadol    Review of patient's allergies indicates:  No Known Allergies    OBJECTIVE:     Vital Signs (Most Recent)  Temp: 99.4 °F (37.4 °C) (08/06/17 1548)  Pulse: 67 (08/06/17 1548)  Resp: 18 (08/06/17 1548)  BP: (!) 102/50 (08/06/17 1548)  SpO2: 99 % (08/06/17 1548)    Admission Weight: 68.3 kg (150 lb 9.2 oz) (07/20/17 0059)   Most Recent Weight: 58.6 kg (129 lb 3 oz) (08/05/17 0449)    Vital Signs Range (Last 24H):  Temp:  [98.2 °F (36.8 °C)-99.4 °F (37.4 °C)]   Pulse:  [61-98]   Resp:  [16-20]   BP: (102-124)/(50-75)   SpO2:  [97 %-100 %]     I & O (Last 24H):    Intake/Output Summary (Last 24 hours) at 08/06/17 1703  Last data filed at 08/06/17 1400   Gross per 24 hour   Intake             1020 ml    Output                0 ml   Net             1020 ml     Physical Exam:  General: no distress  Head: normocephalic  Lungs:  normal respiratory effort  Heart: regular rate and rhythm  Abdomen: soft/nontender   Extremities: warm, well perfused  Skin: Skin color, texture, turgor normal. No rashes or lesions    Wound/Incision:  clean, dry, intact      ASSESSMENT/PLAN:     Assessment:   Active Hospital Problems    Diagnosis    *S/P AVR (aortic valve replacement)    Ineffective coping    Hypomagnesemia    Hypokalemia    Adjustment disorder    Opioid use disorder, severe, in early remission, in controlled environment    Amphetamine use disorder, moderate, in early remission    S/P MVR (mitral valve repair)    Stress hyperglycemia    IV drug abuse       Plan:   -Sternal precautions  -Pt/OT  -Ambulate  -going to LTAC; will need IV rocephin 2 grams Q 24 hours through   August 31, 2017  -continue lasix  -obtain labs q M-W-F  -monitor electrolytes and replace prn  -monitor H&H and replace prn  -accuchecks QID  -SSI prn  -Discharge planning ongoing for LTAC, hoping for Monday due to insurance issues     Goran Moreira MD  08/06/2017 5:04 PM

## 2017-08-06 NOTE — PLAN OF CARE
Problem: Patient Care Overview  Goal: Individualization & Mutuality  DX: Bacterial Endocarditis    Hx: L shoulder dislocation    Sx: 7/21 Aortic valve replacement,mitral valve repair,aortic root repair,neoparicardial placement     Nursing  - MAP <90   Outcome: Ongoing (interventions implemented as appropriate)  POC reviewed with pt. VS stable. Pt refuses telemetry. Pt received imodium and pain medication as ordered. IV ABx received. Pt remains free from falls, trauma, injury; pt tolerating POC well. Will continue to monitor.

## 2017-08-06 NOTE — PLAN OF CARE
Problem: Patient Care Overview  Goal: Plan of Care Review  Outcome: Ongoing (interventions implemented as appropriate)  Patient complained of incisional pain overnight, relieved by prn pain medication. Denies chest pain, SOB, or other pain discomfort. Patient receiving IV abx Q24H. Patient ambulating independently. Patient remains free of falls or injury. No significant events, Awaiting LTAC placement for IV abx.  Patient verbalizes complete understanding of plan of care. Will continue to monitor.

## 2017-08-06 NOTE — PROGRESS NOTES
Patient refusing telemetry monitor. Notified Dr. Whitehead, on call MD with CTS. No new orders at this time, will continue to monitor.

## 2017-08-07 LAB
ANION GAP SERPL CALC-SCNC: 9 MMOL/L
BASOPHILS # BLD AUTO: 0.03 K/UL
BASOPHILS NFR BLD: 0.4 %
BUN SERPL-MCNC: 19 MG/DL
CALCIUM SERPL-MCNC: 9.6 MG/DL
CHLORIDE SERPL-SCNC: 102 MMOL/L
CO2 SERPL-SCNC: 27 MMOL/L
CREAT SERPL-MCNC: 1 MG/DL
DIFFERENTIAL METHOD: ABNORMAL
EOSINOPHIL # BLD AUTO: 0.2 K/UL
EOSINOPHIL NFR BLD: 2.6 %
ERYTHROCYTE [DISTWIDTH] IN BLOOD BY AUTOMATED COUNT: 14.9 %
EST. GFR  (AFRICAN AMERICAN): >60 ML/MIN/1.73 M^2
EST. GFR  (NON AFRICAN AMERICAN): >60 ML/MIN/1.73 M^2
GLUCOSE SERPL-MCNC: 95 MG/DL
HCT VFR BLD AUTO: 31.7 %
HGB BLD-MCNC: 10 G/DL
LYMPHOCYTES # BLD AUTO: 3.2 K/UL
LYMPHOCYTES NFR BLD: 45.7 %
MAGNESIUM SERPL-MCNC: 2.1 MG/DL
MCH RBC QN AUTO: 26.1 PG
MCHC RBC AUTO-ENTMCNC: 31.5 G/DL
MCV RBC AUTO: 83 FL
MONOCYTES # BLD AUTO: 0.7 K/UL
MONOCYTES NFR BLD: 10.6 %
NEUTROPHILS # BLD AUTO: 2.8 K/UL
NEUTROPHILS NFR BLD: 40.4 %
PHOSPHATE SERPL-MCNC: 4.6 MG/DL
PLATELET # BLD AUTO: 423 K/UL
PMV BLD AUTO: 8.5 FL
POTASSIUM SERPL-SCNC: 4.8 MMOL/L
RBC # BLD AUTO: 3.83 M/UL
SODIUM SERPL-SCNC: 138 MMOL/L
WBC # BLD AUTO: 7 K/UL

## 2017-08-07 PROCEDURE — 25000003 PHARM REV CODE 250: Performed by: NURSE PRACTITIONER

## 2017-08-07 PROCEDURE — 25000003 PHARM REV CODE 250: Performed by: THORACIC SURGERY (CARDIOTHORACIC VASCULAR SURGERY)

## 2017-08-07 PROCEDURE — 25000003 PHARM REV CODE 250: Performed by: STUDENT IN AN ORGANIZED HEALTH CARE EDUCATION/TRAINING PROGRAM

## 2017-08-07 PROCEDURE — A4216 STERILE WATER/SALINE, 10 ML: HCPCS | Performed by: THORACIC SURGERY (CARDIOTHORACIC VASCULAR SURGERY)

## 2017-08-07 PROCEDURE — 80048 BASIC METABOLIC PNL TOTAL CA: CPT

## 2017-08-07 PROCEDURE — 84100 ASSAY OF PHOSPHORUS: CPT

## 2017-08-07 PROCEDURE — 63600175 PHARM REV CODE 636 W HCPCS: Performed by: THORACIC SURGERY (CARDIOTHORACIC VASCULAR SURGERY)

## 2017-08-07 PROCEDURE — 20600001 HC STEP DOWN PRIVATE ROOM

## 2017-08-07 PROCEDURE — 85025 COMPLETE CBC W/AUTO DIFF WBC: CPT

## 2017-08-07 PROCEDURE — 83735 ASSAY OF MAGNESIUM: CPT

## 2017-08-07 RX ORDER — OXYCODONE HYDROCHLORIDE 5 MG/1
10 TABLET ORAL EVERY 4 HOURS PRN
Status: DISCONTINUED | OUTPATIENT
Start: 2017-08-07 | End: 2017-08-08

## 2017-08-07 RX ADMIN — OXYCODONE HYDROCHLORIDE 5 MG: 5 TABLET ORAL at 04:08

## 2017-08-07 RX ADMIN — POTASSIUM CHLORIDE 20 MEQ: 1500 TABLET, EXTENDED RELEASE ORAL at 09:08

## 2017-08-07 RX ADMIN — OXYCODONE HYDROCHLORIDE 10 MG: 5 TABLET ORAL at 09:08

## 2017-08-07 RX ADMIN — POTASSIUM CHLORIDE 20 MEQ: 1500 TABLET, EXTENDED RELEASE ORAL at 08:08

## 2017-08-07 RX ADMIN — METHOCARBAMOL 500 MG: 500 TABLET ORAL at 05:08

## 2017-08-07 RX ADMIN — OXYCODONE HYDROCHLORIDE 5 MG: 5 TABLET ORAL at 07:08

## 2017-08-07 RX ADMIN — OXYCODONE HYDROCHLORIDE 5 MG: 5 TABLET ORAL at 09:08

## 2017-08-07 RX ADMIN — FUROSEMIDE 20 MG: 20 TABLET ORAL at 05:08

## 2017-08-07 RX ADMIN — AMLODIPINE BESYLATE 10 MG: 10 TABLET ORAL at 09:08

## 2017-08-07 RX ADMIN — METOPROLOL TARTRATE 50 MG: 50 TABLET, FILM COATED ORAL at 09:08

## 2017-08-07 RX ADMIN — Medication 10 ML: at 05:08

## 2017-08-07 RX ADMIN — Medication 10 ML: at 12:08

## 2017-08-07 RX ADMIN — CEFTRIAXONE 2 G: 2 INJECTION, SOLUTION INTRAVENOUS at 09:08

## 2017-08-07 RX ADMIN — FLUCONAZOLE 200 MG: 200 TABLET ORAL at 09:08

## 2017-08-07 RX ADMIN — MAGNESIUM OXIDE TAB 400 MG (241.3 MG ELEMENTAL MG) 400 MG: 400 (241.3 MG) TAB at 08:08

## 2017-08-07 RX ADMIN — FUROSEMIDE 20 MG: 20 TABLET ORAL at 09:08

## 2017-08-07 RX ADMIN — METOPROLOL TARTRATE 50 MG: 50 TABLET, FILM COATED ORAL at 08:08

## 2017-08-07 RX ADMIN — OXYCODONE HYDROCHLORIDE 5 MG: 5 TABLET ORAL at 05:08

## 2017-08-07 RX ADMIN — ASPIRIN 325 MG ORAL TABLET 325 MG: 325 PILL ORAL at 09:08

## 2017-08-07 RX ADMIN — MAGNESIUM OXIDE TAB 400 MG (241.3 MG ELEMENTAL MG) 400 MG: 400 (241.3 MG) TAB at 09:08

## 2017-08-07 RX ADMIN — LISINOPRIL 2.5 MG: 2.5 TABLET ORAL at 09:08

## 2017-08-07 NOTE — PLAN OF CARE
CM called Yvonne with [facility] and left voice mail. CM attempt to inform pt but, resting quietly with lights out. CM updated NP and she will inform pt.

## 2017-08-07 NOTE — PROGRESS NOTES
Progress Note  Cardiothoracic Surgery    Admit Date: 7/19/2017  Post-operative Day: 17 Days Post-Op  Hospital Day: 20    SUBJECTIVE:  No acute events overnight.  SR per monitor     Follow-up For: Procedure(s) (LRB):  REPLACEMENT-VALVE-AORTIC (N/A)  REPAIR VALVE-MITRAL (N/A)  REPAIR AORTIC ROOT (N/A)  PLACEMENT-NEOPERICARDIUM (N/A)    Scheduled Meds:   amlodipine  10 mg Oral Daily    aspirin  325 mg Oral Daily    cefTRIAXone (ROCEPHIN) IVPB  2 g Intravenous Q24H    fluconazole  200 mg Oral Daily    furosemide  20 mg Oral BID    lisinopril  2.5 mg Oral Daily    magnesium oxide  400 mg Oral BID    methocarbamol  500 mg Oral QID    metoprolol tartrate  50 mg Oral BID    nicotine  1 patch Transdermal Daily    potassium chloride SA  20 mEq Oral BID    sodium chloride 0.9%  10 mL Intravenous Q6H     Infusions/Drips:   PRN Meds: (Magic mouthwash) 1:1:1 Benadryl 12.5mg/5ml liq, aluminum & magnesium hydroxide-simehticone (Maalox), lidocaine viscous 2%, dextrose 50%, dextrose 50%, glucagon (human recombinant), glucose, glucose, insulin aspart, lactulose, loperamide, ondansetron, oxycodone, pneumoc 13-edith conj-dip cr(PF), Flushing PICC Protocol **AND** sodium chloride 0.9% **AND** sodium chloride 0.9%, tramadol    Review of patient's allergies indicates:  No Known Allergies    OBJECTIVE:     Vital Signs (Most Recent)  Temp: 98.1 °F (36.7 °C) (08/07/17 1200)  Pulse: 64 (08/07/17 1200)  Resp: 18 (08/07/17 1200)  BP: (!) 100/55 (08/07/17 1200)  SpO2: 97 % (08/07/17 1200)    Admission Weight: 68.3 kg (150 lb 9.2 oz) (07/20/17 0059)   Most Recent Weight: 58.6 kg (129 lb 3 oz) (08/05/17 0449)    Vital Signs Range (Last 24H):  Temp:  [98.1 °F (36.7 °C)-99.4 °F (37.4 °C)]   Pulse:  [64-78]   Resp:  [16-18]   BP: (100-120)/(50-59)   SpO2:  [96 %-99 %]     I & O (Last 24H):  Intake/Output Summary (Last 24 hours) at 08/07/17 1427  Last data filed at 08/07/17 1400   Gross per 24 hour   Intake             3074 ml   Output                 0 ml   Net             3074 ml     Physical Exam:  General: no distress  Lungs:  clear to auscultation bilaterally and normal respiratory effort  Heart: regular rate and rhythm  Abdomen: soft/nontender   Extremities: warm, well perfused  Skin: Skin color, texture, turgor normal. No rashes or lesions    Wound/Incision:  clean, dry, intact    Laboratory:  CBC:   Recent Labs  Lab 08/07/17  0510   WBC 7.00   RBC 3.83*   HGB 10.0*   HCT 31.7*   *   MCV 83   MCH 26.1*   MCHC 31.5*     BMP:   Recent Labs  Lab 08/07/17  0510   GLU 95      K 4.8      CO2 27   BUN 19   CREATININE 1.0   CALCIUM 9.6   MG 2.1         ASSESSMENT/PLAN:     Assessment:   Active Hospital Problems    Diagnosis    *S/P AVR (aortic valve replacement)    Ineffective coping    Hypomagnesemia    Hypokalemia    Adjustment disorder    Opioid use disorder, severe, in early remission, in controlled environment    Amphetamine use disorder, moderate, in early remission    S/P MVR (mitral valve repair)    Stress hyperglycemia    IV drug abuse       Plan:   -Sternal precautions  -Pt/OT  -Ambulate  -going to LTAC; will need IV rocephin 2 grams Q 24 hours through   August 31, 2017  -continue lasix  -obtain labs q M-W-F  -monitor electrolytes and replace prn  -monitor H&H and replace prn  -accuchecks QID  -SSI prn  -Discharge planning ongoing for LTAC, hoping for Monday due to insurance issues

## 2017-08-07 NOTE — PLAN OF CARE
CM called Yvonne with [facility]. Yvonne stated they are in negotiations with insurance. Insurance company sent their daily rate and [facility] countered back. Awaiting to hear back from insurance. CM updated patient.

## 2017-08-07 NOTE — PLAN OF CARE
Problem: Patient Care Overview  Goal: Plan of Care Review  Outcome: Ongoing (interventions implemented as appropriate)  POC reviewed with pt. VS stable. Pt received IV ABx as scheduled. Pt remains free from falls, trauma, injury; pt tolerating POC well. Will continue to monitor.

## 2017-08-07 NOTE — PLAN OF CARE
Problem: Patient Care Overview  Goal: Plan of Care Review  Outcome: Ongoing (interventions implemented as appropriate)  Patient complained of incisional pain overnight, relieved by PRN PO pain medication. Denies chest pain, SOB, or other pain discomfort. Continuing with Q24H IV antibiotics per order. Patient remains free of falls or injury. No significant events. Awaiting insurance approval for placement to LTAC. Patient verbalizes complete understanding of plan of care. Will continue to monitor.

## 2017-08-08 VITALS
RESPIRATION RATE: 18 BRPM | OXYGEN SATURATION: 98 % | HEART RATE: 70 BPM | SYSTOLIC BLOOD PRESSURE: 90 MMHG | HEIGHT: 68 IN | BODY MASS INDEX: 19.58 KG/M2 | DIASTOLIC BLOOD PRESSURE: 48 MMHG | TEMPERATURE: 99 F | WEIGHT: 129.19 LBS

## 2017-08-08 PROCEDURE — 25000003 PHARM REV CODE 250: Performed by: STUDENT IN AN ORGANIZED HEALTH CARE EDUCATION/TRAINING PROGRAM

## 2017-08-08 PROCEDURE — 25000003 PHARM REV CODE 250: Performed by: NURSE PRACTITIONER

## 2017-08-08 PROCEDURE — 25000003 PHARM REV CODE 250: Performed by: THORACIC SURGERY (CARDIOTHORACIC VASCULAR SURGERY)

## 2017-08-08 PROCEDURE — A4216 STERILE WATER/SALINE, 10 ML: HCPCS | Performed by: THORACIC SURGERY (CARDIOTHORACIC VASCULAR SURGERY)

## 2017-08-08 PROCEDURE — 63600175 PHARM REV CODE 636 W HCPCS: Performed by: THORACIC SURGERY (CARDIOTHORACIC VASCULAR SURGERY)

## 2017-08-08 RX ORDER — LISINOPRIL 2.5 MG/1
5 TABLET ORAL DAILY
Qty: 30 TABLET | Refills: 2 | Status: SHIPPED | OUTPATIENT
Start: 2017-08-08 | End: 2017-09-20 | Stop reason: SDUPTHER

## 2017-08-08 RX ORDER — ASPIRIN 325 MG
325 TABLET ORAL DAILY
Refills: 0 | COMMUNITY
Start: 2017-08-08 | End: 2017-09-20 | Stop reason: SDUPTHER

## 2017-08-08 RX ORDER — METOPROLOL TARTRATE 50 MG/1
50 TABLET ORAL 2 TIMES DAILY
Qty: 60 TABLET | Refills: 2 | Status: SHIPPED | OUTPATIENT
Start: 2017-08-08 | End: 2017-09-20 | Stop reason: ALTCHOICE

## 2017-08-08 RX ORDER — TRAMADOL HYDROCHLORIDE 50 MG/1
50 TABLET ORAL EVERY 6 HOURS PRN
Qty: 60 TABLET | Refills: 1 | Status: SHIPPED | OUTPATIENT
Start: 2017-08-08 | End: 2017-08-18

## 2017-08-08 RX ORDER — METHOCARBAMOL 500 MG/1
500 TABLET, FILM COATED ORAL 3 TIMES DAILY PRN
Qty: 30 TABLET | Refills: 1 | Status: SHIPPED | OUTPATIENT
Start: 2017-08-08 | End: 2017-08-18

## 2017-08-08 RX ORDER — LANOLIN ALCOHOL/MO/W.PET/CERES
400 CREAM (GRAM) TOPICAL 2 TIMES DAILY
Refills: 0 | COMMUNITY
Start: 2017-08-08 | End: 2017-09-20 | Stop reason: ALTCHOICE

## 2017-08-08 RX ORDER — METHOCARBAMOL 500 MG/1
500 TABLET, FILM COATED ORAL 3 TIMES DAILY PRN
Status: DISCONTINUED | OUTPATIENT
Start: 2017-08-08 | End: 2017-08-08 | Stop reason: HOSPADM

## 2017-08-08 RX ORDER — NICOTINE 7MG/24HR
1 PATCH, TRANSDERMAL 24 HOURS TRANSDERMAL DAILY
Refills: 0 | COMMUNITY
Start: 2017-08-08 | End: 2019-02-27 | Stop reason: CLARIF

## 2017-08-08 RX ADMIN — OXYCODONE HYDROCHLORIDE 10 MG: 5 TABLET ORAL at 08:08

## 2017-08-08 RX ADMIN — POTASSIUM CHLORIDE 20 MEQ: 1500 TABLET, EXTENDED RELEASE ORAL at 08:08

## 2017-08-08 RX ADMIN — ASPIRIN 325 MG ORAL TABLET 325 MG: 325 PILL ORAL at 08:08

## 2017-08-08 RX ADMIN — FUROSEMIDE 20 MG: 20 TABLET ORAL at 08:08

## 2017-08-08 RX ADMIN — AMLODIPINE BESYLATE 10 MG: 10 TABLET ORAL at 08:08

## 2017-08-08 RX ADMIN — METOPROLOL TARTRATE 50 MG: 50 TABLET, FILM COATED ORAL at 08:08

## 2017-08-08 RX ADMIN — LISINOPRIL 2.5 MG: 2.5 TABLET ORAL at 08:08

## 2017-08-08 RX ADMIN — FLUCONAZOLE 200 MG: 200 TABLET ORAL at 08:08

## 2017-08-08 RX ADMIN — Medication 10 ML: at 12:08

## 2017-08-08 RX ADMIN — OXYCODONE HYDROCHLORIDE 5 MG: 5 TABLET ORAL at 12:08

## 2017-08-08 RX ADMIN — OXYCODONE HYDROCHLORIDE 10 MG: 5 TABLET ORAL at 03:08

## 2017-08-08 RX ADMIN — MAGNESIUM OXIDE TAB 400 MG (241.3 MG ELEMENTAL MG) 400 MG: 400 (241.3 MG) TAB at 08:08

## 2017-08-08 RX ADMIN — CEFTRIAXONE 2 G: 2 INJECTION, SOLUTION INTRAVENOUS at 08:08

## 2017-08-08 RX ADMIN — TRAMADOL HYDROCHLORIDE 50 MG: 50 TABLET, COATED ORAL at 12:08

## 2017-08-08 NOTE — PLAN OF CARE
[facility]     Department of Hospital Medicine     [address] [place] [address]     [phone] after hours  [phone] fax                                        FACILITY TRANSFER ORDERS     Patient Name: Kwan Palacio  YOB: [birth year]    Admit to: LTAC    Diagnoses:    Aortic valve endocarditis/acute bacterial    Active Hospital Problems    Diagnosis  POA    *S/P AVR (aortic valve replacement) [Z95.2]  Not Applicable    Ineffective coping [R45.89]  Yes    Hypomagnesemia [E83.42]  No    Hypokalemia [E87.6]  Yes    Adjustment disorder [F43.20]  Yes    Opioid use disorder, severe, in early remission, in controlled environment [F11.21]  Yes     Chronic    Amphetamine use disorder, moderate, in early remission [F15.20]  Yes     Chronic    S/P MVR (mitral valve repair) [Z98.890]  Not Applicable    Stress hyperglycemia [R73.9]  No    IV drug abuse [F19.10]  Yes      Resolved Hospital Problems    Diagnosis Date Resolved POA    Acute bacterial endocarditis [I33.0] 08/05/2017 Yes    Subacute bacterial endocarditis [I33.0] 08/05/2017 Yes    AV block, 1st degree [I44.0] 07/22/2017 Yes    Hemoptysis [R04.2] 07/26/2017 Yes    Endocarditis [I38] 08/05/2017 Yes       Vital Signs: Routine.    Allergies:Review of patient's allergies indicates:  No Known Allergies    Diet:  Regular     Acitivities: as tolerated    Nursing: PICC line care per facility protocol  Sternal precautions: no lifting of more than 5 pounds, no push pull with his arms.  Please have patient take daily showers.  Use Soap and water to incision site and chest tube sites daily.  ABSOLUTELY no ointments, creams, lotions, powders, or perfumes to incision or chest tube sites.    Please DC the PICC line immediately after the last dose of Rocephin.  Do not discharge patient with PICC line.    Labs: BMP and CBC q monday    CONSULTS:   (   Physical Therapy to evaluate and  treat     Occupational Therapy to evaluate and treat    MISCELLANEOUS CARE:   Medications:            Aspirin 325 mg oral daily  Lisinopril 5 mg oral daily  Magnesium Oxide 400 mg oral BID  Fish oil 1000 mg oral daily  Nicotine 1 patch transdermal daily  Metoprolol 25 mg oral BID  Robaxin 500 mg oral TID prn muscle spasms  Ultram 50 mg q 6 hours oral prn severe pain  Tylenol 650 mg oral prn q 4 hours prn H/A or fever greater than       101  Ceftriaxone 2 grams IVPB IV q 24 hours (LAST DOSE AUGUST 31, 2017)        _________________________________  Lydia Denson NP  08/08/2017

## 2017-08-08 NOTE — PLAN OF CARE
NISH spoke with Yvonne at [facility].  They can accept the pt today.  NISH asked the NP to do the orders.  SW will send the orders once written.  Pt can transfer today.    Marcella Fontenot, [facility] x [phone]

## 2017-08-08 NOTE — PROGRESS NOTES
Report called to adriane at [place] acute [facility] in [place] [phone]

## 2017-08-08 NOTE — PROGRESS NOTES
Patient is ready for discharge. Patient stable alert and oriented. PICC intact. No complaints of pain. Discussed discharge plan. Reviewed medications and side effects, appointments, and answered questions with patient. Transportation at bedside

## 2017-08-08 NOTE — PROGRESS NOTES
Patient states that earlier today he slipped in the bathroom and caught himself on the bathroom counter. Patient denies any fall or trauma, but endorses soreness to MSI site. Patient has PRN oxycodone immediate release tablet 5mg Q3H PRN, but states that this is not helping to control his pain. Notified Dr. Dee Dee MD on call for CTS. Telephone order with readback verification to administer oxycodone immediate release tablet 10mg Q4H for severe pain. Will do so and continue to monitor.

## 2017-08-08 NOTE — PLAN OF CARE
NISH spoke with Yvonne at [facility] and she gave the SW the number to call report.  [facility] will send transportation to  the pt between 3-4pm.  SW updated the nurse.  Pt was informed.    Marcella Fontenot LCSW x [phone]

## 2017-08-08 NOTE — PLAN OF CARE
SW sent the orders to [facility].  They will call the SW with the number to call report.  SW updated the NP who will update the pt.    Marcella Fontenot, [facility] x [phone]

## 2017-08-08 NOTE — PLAN OF CARE
Problem: Patient Care Overview  Goal: Plan of Care Review  Plan of care discussed with patient.  Patient ambulating independently, fall precautions in place.Continuing to encourage sternal precautions, IS, and ambulation. Pain managed with prn pain meds. Discussed medications and care. Patient has no questions at this time. Will continue to monitor.

## 2017-08-09 NOTE — DISCHARGE SUMMARY
DATE OF ADMISSION:  07/19/2017    DATE OF DISCHARGE:  08/08/2017    DIAGNOSES:  1. Severe aortic insufficiency.  2. Severe mitral insufficiency.  3. Infective endocarditis.  4. Bicuspid aortic valve.  5. Anemia of chronic disease.    PROCEDURES PERFORMED:  On 07/21/2017, aortic valve replacement with a 23 mm St.   Yan Epic valve, aortic augmentation with a bovine pericardial patch and mitral   valve repair with a bovine pericardial patch.    HISTORY OF PRESENT ILLNESS:  This is a 26-year-old male who had a 2-month   history of malaise and night sweats.  He was evaluated on the [facility].  He   had a transesophageal echo from approximately 2 weeks prior to his transfer to   our facility, which demonstrated the valvular lesions to be felt endocarditis.    He was transferred to [facility] for a higher level of care as well as   cardiac surgery intervention.  Repeat echo was done at [facility] and   found to be essentially the same.  We discussed the surgery with the patient and   his family.  We explained the risks of endocarditis with his valvular lesions   and the possibility of having to do 2 valve replacements as well as   reconstruction.  We also talked to him about valve choices.  The patient did not   feel that he could be on long-term anticoagulation with Coumadin and felt he   would do better with a tissue valve.  His surgery was planned.    HOSPITAL COURSE:  On 07/21/2017, the patient was taken to the Operating Room for   the above stated procedure.  Please see the previously dictated operative   report for complete details.  Postoperatively, the patient was taken from the   Operating Room to the ICU where his vital signs were monitored and pain was kept   under control.  He was weaned from his drips and extubated in the ICU per   protocol.  Once he was felt to be hemodynamically stable, he was transferred to   the Cardiac Step-Down floor for continued strengthening and ambulation.     Infectious Disease was also consulted.  PICC line was placed for long-term   antibiotics.  He will be on Rocephin every 24 hours through August 31st to treat   his infective endocarditis.  For this reason, he was kept in the hospital while   insurance arrangements were being worked out and approved with a long-term   acute care facility on the [place].  He has been stable for 2 to 3 weeks,   but as stated above was kept here because he could not go home with a PICC line   given his history of IV drug abuse.  Today on August 8th, the patient's   insurance and long-term acute care facility were able to work out placement.  He   will be escorted by a wheelchair van to the long-term acute care facility on   the [place].  These arrangements have been made by his family as well as our   case management.  They understand he needs his antibiotics through August 31st.    At that time, his PICC line will be removed at the facility prior to his   discharge to home.  He will see Dr. Fitch in approximately three weeks.  At the   time of his transfer to the LTAC, he was ambulating, in normal sinus rhythm.    Labs were stable.  His pain was well controlled with oral analgesics and   tolerating his diet.    MOBILITY AND ACTIVITY:  As tolerated.  He may shower.  No heavy lifting of   greater than 5 pounds and no driving.    DISCHARGE DIET:  Regular.    WOUND CARE INSTRUCTIONS:  Check for redness, swelling, drainage around the   incision or wound.  He is to call for any obvious bleeding, drainage, pus from   the wound, unusual problems or difficulties, or temperature of greater than 101   degrees.    FOLLOWUP:  Follow up with Dr. Fitch in 3 weeks.  Prior to this appointment, the   patient will have a chest x-ray and EKG.    DISCHARGE MEDICATIONS:  Aspirin 325 mg daily, lisinopril 5 mg daily, magnesium   oxide 400 b.i.d., fish oil 1000 mg daily, nicotine patch daily, metoprolol 25 mg   b.i.d., Robaxin 500 mg t.i.d. p.r.n.  muscle spasms, Ultram 50 mg every 6 hours   as needed for severe pain.  Tylenol as needed for headache or temperature and   ceftriaxone 2 g IV every 24 hours.    The patient was not discharged on Lasix as he has completed approximately 3   weeks of diuretics.  He had no evidence of hypervolemia and was without   peripheral edema.  He was not placed on a statin due to lack of evidence of   coronary artery disease.    At the time of discharge, the patient's vital signs were stable.  He was   afebrile and in normal sinus rhythm.    DICTATED BY:  ZANA Peraza/REMBERTO  dd: 08/08/2017 11:31:31 (CDT)  td: 08/08/2017 23:35:59 (CDT)  Doc ID   #[id number]  Job ID #[id number]    CC:

## 2017-08-10 NOTE — PHYSICIAN QUERY
PT Name: Kwan Palacio  MR #: [id number]    Physician Query Form - Consultant Diagnosis Clarification     CDS/: Lupe Roger  Rn, CCDS             Contact information: [email or web address]    This form is a permanent document in the medical record.     Query Date: August 10, 2017      By submitting this query, we are merely seeking further clarification of documentation.  Please utilize your independent clinical judgment when addressing the question(s) below.      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 The aortic valve is bi-leaflet in structure. Additionally, there is severe aortic regurgitation. Type 0 bicupsid aortic valve with thickened poasterior leaflet and nodular denisty on it possibly suggesting vegetation   Mitral Valve: valve is thickened. There is severe mitral regurgitation. 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 There are two jets of MR, one through the perforation and another, central.     Lasix 20 mg IV BID    Lasix IV drip    Pulmonary: Negative for SOB    Visually estimated ejection fraction is 60-65%     ID Consult 7/20      CTS Consult 7/20                            Cardiology H&P 7/20    MAR 7/21-7/23    Cardiology H&P 7/20    Echo 7/20       Do you agree with the Consultants diagnosis of heart failure?                 [ X  ]   Yes               [   ]   Yes, but it resolved prior to my assessment of the patient               [   ]   No                [   ]   Clinically insignificant               [   ]   Clinically undetermined               [   ]   Other/Clarification of findings: ___________________________________________

## 2017-08-22 ENCOUNTER — OFFICE VISIT (OUTPATIENT)
Dept: INFECTIOUS DISEASES | Facility: CLINIC | Age: 27
End: 2017-08-22
Payer: MEDICAID

## 2017-08-22 VITALS
DIASTOLIC BLOOD PRESSURE: 94 MMHG | WEIGHT: 130.75 LBS | TEMPERATURE: 97 F | HEART RATE: 89 BPM | HEIGHT: 68 IN | SYSTOLIC BLOOD PRESSURE: 140 MMHG | BODY MASS INDEX: 19.82 KG/M2

## 2017-08-22 DIAGNOSIS — B95.5 STREPTOCOCCAL ENDOCARDITIS: Primary | ICD-10-CM

## 2017-08-22 DIAGNOSIS — I33.0 STREPTOCOCCAL ENDOCARDITIS: Primary | ICD-10-CM

## 2017-08-22 PROCEDURE — 99214 OFFICE O/P EST MOD 30 MIN: CPT | Mod: S$PBB,,, | Performed by: INTERNAL MEDICINE

## 2017-08-22 PROCEDURE — 99999 PR PBB SHADOW E&M-EST. PATIENT-LVL III: CPT | Mod: PBBFAC,,, | Performed by: INTERNAL MEDICINE

## 2017-08-22 PROCEDURE — 99213 OFFICE O/P EST LOW 20 MIN: CPT | Mod: PBBFAC | Performed by: INTERNAL MEDICINE

## 2017-08-22 NOTE — PROGRESS NOTES
Subjective:      Patient ID: Kwan Palacio is a 26 y.o. male.    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 He reports that since discharge from the hospital he has been feeling well. He was at the LTAC completing IV antibiotics (last day was supposed to be 9/1/17), but he left AMA yesterday. He says that he has received enough antibiotics and that he is feeling well, denies any symptom.    TTE 7/20/17:     1 - Eccentric hypertrophy.     2 - Normal left ventricular systolic function (EF 60-65%).     3 - No wall motion abnormalities.     4 - Normal right ventricular systolic function .     5 - Bi-leaflet aortic valve.     6 - Severe aortic regurgitation.     7 - Severe mitral regurgitation.     8 - Vegetation on the aortic and probable on the mitral valve with an abscess in the aorto-mitral fibrous curtain.     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     Review of Systems   Constitution: Negative for chills, decreased appetite, fever, weakness, malaise/fatigue, night sweats, weight gain and weight loss.   HENT: Negative for congestion, ear pain, headaches, hearing loss, hoarse voice, sore throat and tinnitus.    Eyes: Negative for blurred vision, redness and visual disturbance.   Cardiovascular: Negative for chest pain, leg swelling and palpitations.   Respiratory: Negative for cough, hemoptysis, shortness of breath and sputum production.    Hematologic/Lymphatic: Negative for adenopathy. Does not bruise/bleed easily.   Skin: Negative for dry skin, itching, rash and suspicious lesions.   Musculoskeletal: Negative for back pain, joint pain, myalgias and neck pain.   Gastrointestinal: Negative for abdominal pain,  constipation, diarrhea, heartburn, nausea and vomiting.   Genitourinary: Negative for dysuria, flank pain, frequency, hematuria, hesitancy and urgency.   Neurological: Negative for dizziness, numbness and paresthesias.   Psychiatric/Behavioral: Negative for depression and memory loss. The patient does not have insomnia and is not nervous/anxious.      Objective:   Physical Exam   Constitutional: He is oriented to person, place, and time. No distress.   HENT:   Head: Normocephalic and atraumatic.   Mouth/Throat: No oropharyngeal exudate.   Eyes: No scleral icterus.   Cardiovascular: Normal rate, regular rhythm and normal heart sounds.    Pulmonary/Chest: Effort normal and breath sounds normal. No respiratory distress. He has no wheezes. He has no rales.   Abdominal: Soft. Bowel sounds are normal. He exhibits no distension. There is no tenderness. There is no rebound and no guarding.   Musculoskeletal: He exhibits no edema.   Neurological: He is alert and oriented to person, place, and time. No cranial nerve deficit.   Skin:   Multiple tattoos on upper extremities   Vitals reviewed.    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

## 2017-08-23 LAB — FUNGUS SPEC CULT: NORMAL

## 2017-09-20 ENCOUNTER — OFFICE VISIT (OUTPATIENT)
Dept: CARDIOTHORACIC SURGERY | Facility: CLINIC | Age: 27
End: 2017-09-20
Payer: MEDICAID

## 2017-09-20 ENCOUNTER — HOSPITAL ENCOUNTER (OUTPATIENT)
Dept: CARDIOLOGY | Facility: CLINIC | Age: 27
Discharge: HOME | End: 2017-09-20
Payer: MEDICAID

## 2017-09-20 ENCOUNTER — HOSPITAL ENCOUNTER (OUTPATIENT)
Dept: RADIOLOGY | Facility: HOSPITAL | Age: 27
Discharge: HOME | End: 2017-09-20
Attending: THORACIC SURGERY (CARDIOTHORACIC VASCULAR SURGERY)
Payer: MEDICAID

## 2017-09-20 VITALS
WEIGHT: 131 LBS | BODY MASS INDEX: 19.85 KG/M2 | OXYGEN SATURATION: 100 % | SYSTOLIC BLOOD PRESSURE: 136 MMHG | TEMPERATURE: 98 F | HEART RATE: 71 BPM | DIASTOLIC BLOOD PRESSURE: 86 MMHG | HEIGHT: 68 IN

## 2017-09-20 DIAGNOSIS — Z95.2 S/P AVR: ICD-10-CM

## 2017-09-20 DIAGNOSIS — Z98.890 S/P MVR (MITRAL VALVE REPAIR): ICD-10-CM

## 2017-09-20 DIAGNOSIS — Z95.2 S/P AVR: Primary | ICD-10-CM

## 2017-09-20 PROCEDURE — 99024 POSTOP FOLLOW-UP VISIT: CPT | Mod: ,,, | Performed by: THORACIC SURGERY (CARDIOTHORACIC VASCULAR SURGERY)

## 2017-09-20 PROCEDURE — 99213 OFFICE O/P EST LOW 20 MIN: CPT | Mod: PBBFAC,25 | Performed by: THORACIC SURGERY (CARDIOTHORACIC VASCULAR SURGERY)

## 2017-09-20 PROCEDURE — 71020 XR CHEST PA AND LATERAL: CPT | Mod: 26,,, | Performed by: RADIOLOGY

## 2017-09-20 PROCEDURE — 71020 XR CHEST PA AND LATERAL: CPT | Mod: TC

## 2017-09-20 PROCEDURE — 99999 PR PBB SHADOW E&M-EST. PATIENT-LVL III: CPT | Mod: PBBFAC,,, | Performed by: THORACIC SURGERY (CARDIOTHORACIC VASCULAR SURGERY)

## 2017-09-20 PROCEDURE — 93010 ELECTROCARDIOGRAM REPORT: CPT | Mod: S$PBB,,, | Performed by: INTERNAL MEDICINE

## 2017-09-20 PROCEDURE — 93005 ELECTROCARDIOGRAM TRACING: CPT | Mod: PBBFAC | Performed by: INTERNAL MEDICINE

## 2017-09-20 RX ORDER — ASPIRIN 325 MG
325 TABLET ORAL DAILY
Refills: 0 | COMMUNITY
Start: 2017-09-20 | End: 2018-11-13

## 2017-09-20 RX ORDER — LISINOPRIL 2.5 MG/1
5 TABLET ORAL DAILY
Qty: 30 TABLET | Refills: 2 | Status: SHIPPED | OUTPATIENT
Start: 2017-09-20 | End: 2018-11-13

## 2017-09-20 NOTE — PROGRESS NOTES
Patient seen and examined. Patient is progressively increasing activity. No significant complaints.     Sternum: stable, incision CDI  Chest xray: Acceptable post op chest  EKG: NSR     Assessment:  1.  Aortic valve replacement with a 23 mm [place] Epic valve.  2.  Aortic augmentation with a bovine pericardial patch.  3.  Mitral valve repair with a bovine pericardial patch.     Plan:  Can begin driving as long as he has power steering  We will refer to cardiology to assume care- Dr. Zambrano with [facility] metoprolol     Spoke with patient at length in regards to prophylactic antibiotic use before all dental procedures including cleanings. Patient and step-mother verbalized understanding.       No scheduled appointment, RTC prn

## 2017-09-22 LAB
ACID FAST MOD KINY STN SPEC: NORMAL
MYCOBACTERIUM SPEC QL CULT: NORMAL

## 2018-11-13 ENCOUNTER — HOSPITAL ENCOUNTER (EMERGENCY)
Facility: HOSPITAL | Age: 28
Discharge: HOME | End: 2018-11-13
Attending: EMERGENCY MEDICINE
Payer: MEDICAID

## 2018-11-13 VITALS
RESPIRATION RATE: 20 BRPM | BODY MASS INDEX: 22.06 KG/M2 | TEMPERATURE: 98 F | DIASTOLIC BLOOD PRESSURE: 90 MMHG | HEART RATE: 82 BPM | SYSTOLIC BLOOD PRESSURE: 144 MMHG | OXYGEN SATURATION: 97 % | WEIGHT: 145.06 LBS

## 2018-11-13 DIAGNOSIS — I30.9 ACUTE PERICARDITIS, UNSPECIFIED TYPE: Primary | ICD-10-CM

## 2018-11-13 LAB
ALBUMIN SERPL BCP-MCNC: 4.4 G/DL
ALP SERPL-CCNC: 77 U/L
ALT SERPL W/O P-5'-P-CCNC: 17 U/L
ANION GAP SERPL CALC-SCNC: 10 MMOL/L
AST SERPL-CCNC: 37 U/L
BASOPHILS # BLD AUTO: 0.04 K/UL
BASOPHILS NFR BLD: 0.3 %
BILIRUB SERPL-MCNC: 0.4 MG/DL
BNP SERPL-MCNC: 22 PG/ML
BUN SERPL-MCNC: 10 MG/DL
CALCIUM SERPL-MCNC: 9.9 MG/DL
CHLORIDE SERPL-SCNC: 108 MMOL/L
CO2 SERPL-SCNC: 23 MMOL/L
CREAT SERPL-MCNC: 1 MG/DL
DIFFERENTIAL METHOD: ABNORMAL
EOSINOPHIL # BLD AUTO: 0.2 K/UL
EOSINOPHIL NFR BLD: 1.3 %
ERYTHROCYTE [DISTWIDTH] IN BLOOD BY AUTOMATED COUNT: 13.3 %
EST. GFR  (AFRICAN AMERICAN): >60 ML/MIN/1.73 M^2
EST. GFR  (NON AFRICAN AMERICAN): >60 ML/MIN/1.73 M^2
GLUCOSE SERPL-MCNC: 88 MG/DL
HCT VFR BLD AUTO: 47 %
HGB BLD-MCNC: 16.6 G/DL
INR PPP: 1
LYMPHOCYTES # BLD AUTO: 2.8 K/UL
LYMPHOCYTES NFR BLD: 24.4 %
MCH RBC QN AUTO: 29.5 PG
MCHC RBC AUTO-ENTMCNC: 35.3 G/DL
MCV RBC AUTO: 84 FL
MONOCYTES # BLD AUTO: 0.8 K/UL
MONOCYTES NFR BLD: 7.2 %
NEUTROPHILS # BLD AUTO: 7.8 K/UL
NEUTROPHILS NFR BLD: 66.8 %
PLATELET # BLD AUTO: 251 K/UL
PMV BLD AUTO: 9.4 FL
POTASSIUM SERPL-SCNC: 5 MMOL/L
PROT SERPL-MCNC: 8 G/DL
PROTHROMBIN TIME: 10.1 SEC
RBC # BLD AUTO: 5.62 M/UL
SODIUM SERPL-SCNC: 141 MMOL/L
TROPONIN I SERPL DL<=0.01 NG/ML-MCNC: <0.006 NG/ML
WBC # BLD AUTO: 11.65 K/UL

## 2018-11-13 PROCEDURE — 85025 COMPLETE CBC W/AUTO DIFF WBC: CPT

## 2018-11-13 PROCEDURE — 83880 ASSAY OF NATRIURETIC PEPTIDE: CPT

## 2018-11-13 PROCEDURE — 85610 PROTHROMBIN TIME: CPT

## 2018-11-13 PROCEDURE — 93005 ELECTROCARDIOGRAM TRACING: CPT

## 2018-11-13 PROCEDURE — 99285 EMERGENCY DEPT VISIT HI MDM: CPT | Mod: 25

## 2018-11-13 PROCEDURE — 25000003 PHARM REV CODE 250: Performed by: EMERGENCY MEDICINE

## 2018-11-13 PROCEDURE — 93010 ELECTROCARDIOGRAM REPORT: CPT | Mod: ,,, | Performed by: INTERNAL MEDICINE

## 2018-11-13 PROCEDURE — 84484 ASSAY OF TROPONIN QUANT: CPT

## 2018-11-13 PROCEDURE — 80053 COMPREHEN METABOLIC PANEL: CPT

## 2018-11-13 RX ORDER — COLCHICINE 0.6 MG/1
0.6 TABLET ORAL 2 TIMES DAILY
Qty: 60 TABLET | Refills: 2 | Status: SHIPPED | OUTPATIENT
Start: 2018-11-13 | End: 2019-02-27 | Stop reason: CLARIF

## 2018-11-13 RX ORDER — IBUPROFEN 600 MG/1
600 TABLET ORAL EVERY 8 HOURS PRN
COMMUNITY
Start: 2018-11-12 | End: 2018-11-19

## 2018-11-13 RX ORDER — IBUPROFEN 600 MG/1
600 TABLET ORAL
Status: COMPLETED | OUTPATIENT
Start: 2018-11-13 | End: 2018-11-13

## 2018-11-13 RX ORDER — COLCHICINE 0.6 MG/1
0.6 TABLET ORAL
COMMUNITY
Start: 2018-11-12 | End: 2018-11-13

## 2018-11-13 RX ADMIN — IBUPROFEN 600 MG: 600 TABLET ORAL at 01:11

## 2018-11-13 NOTE — ED PROVIDER NOTES
SCRIBE #1 NOTE: I, Charan Mace, am scribing for, and in the presence of, Torey Tolentino MD. I have scribed the entire note.       History     Chief Complaint   Patient presents with    Chest Pain     Review of patient's allergies indicates:  No Known Allergies      History of Present Illness     HPI    11/13/2018, 10:21 AM  History obtained from the patient      History of Present Illness: Kwan Palacio is a 28 y.o. 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 Pt reports he was lying down when he felt a pain in his chest.  Patient states he is alright when sitting up.  Pt states he initially disregarded the pain as soreness secondary to exercising x2 days prior. Pt states the pain worsened which prompted him to go to [facility] for further evaluation. Pt reports he was told his sxs were due to pericarditis and pt was d/c with a rx for colchicine and ibuprofen for x7 days. Pt presents to ED today for further evaluation stating he has had no relief from colchicine and is still experiencing chest discomfort when lying flat. When prompted pt denies SOB stating he just experiences discomfort when taking deep breaths or when lying flat. In the upright position pt reports he feels fine. Pt reports he had pericarditis months ago and was treated in [place] with a medication that he cannot remember the name of. Symptoms resolved. Pt also reports a hx of a valve replacement years ago and endocarditis following the procedure which was treated with abx. Pt states his current pain and associated sxs are different from the pain, fever/chills, and night sweats he experienced with his endocarditis.  Patient no longer uses IV drugs.  Symptoms are constant and moderate in severity. Exacerbated by coughing/deep breathing/ lying flat/ smoking and  relieved by nothing. No associated sxs. Patient denies any fever, chills, SOB, palpitations, leg swelling, diaphoresis, recent travel, N/V/D, dysuria, hematuria, HA, weakness/numbness, and all other sxs at this time. No further complaints or concerns at this time.     Arrival mode: Personal vehicle    PCP: Primary Doctor No        Past Medical History:  Past Medical History:   Diagnosis Date    ADHD (attention deficit hyperactivity disorder)     Depression     Dislocation of shoulder, left, closed     Hx of psychiatric care     Psychiatric problem        Past Surgical History:  Past Surgical History:   Procedure Laterality Date    PLACEMENT-NEOPERICARDIUM N/A 7/21/2017    Performed by [facility] at [facility] OR 2ND FLR    REPAIR AORTIC ROOT N/A 7/21/2017    Performed by [facility] at [facility] OR [facility]    REPAIR VALVE-MITRAL N/A 7/21/2017    Performed by [facility] at [facility] OR [facility]    REPLACEMENT-VALVE-AORTIC N/A 7/21/2017    Performed by [facility]itch MD at [facility] OR [facility]         Family History:  History reviewed, not pertinent.       Social History:  Social History     Tobacco Use    Smoking status: Never Smoker    Smokeless tobacco: Current User     Types: Snuff, Chew   Substance and Sexual Activity    Alcohol use: Yes     Comment: occasional    Drug use: Yes     Types: Methamphetamines, MDMA (Ecstacy), Benzodiazepines    Sexual activity: Yes     Partners: Female        Review of Systems     Review of Systems   Constitutional: Negative for chills, diaphoresis and fever.   HENT: Negative for congestion and sore throat.    Respiratory: Negative for cough, chest tightness, shortness of breath and wheezing.    Cardiovascular: Positive for chest pain (Diffuse). Negative for palpitations and leg swelling.   Gastrointestinal: Negative for abdominal pain, nausea and vomiting.   Genitourinary: Negative for dysuria, frequency, hematuria and urgency.   Musculoskeletal:  Negative for back pain and neck pain.   Skin: Negative for rash.   Neurological: Negative for dizziness, weakness, light-headedness and headaches.   Hematological: Does not bruise/bleed easily.   All other systems reviewed and are negative.     Physical Exam     Initial Vitals   BP Pulse Resp Temp SpO2   11/13/18 1015 11/13/18 1010 11/13/18 1030 11/13/18 1019 11/13/18 1030   137/84 80 20 98.4 °F (36.9 °C) 99 %      MAP       --                 Physical Exam  Nursing Notes and Vital Signs Reviewed.  Constitutional: Patient is in no acute distress. Well-developed and well-nourished.  Head: Atraumatic. Normocephalic.  Eyes: PERRL. EOM intact. Conjunctivae are not pale. No scleral icterus.  ENT: Mucous membranes are moist. Oropharynx is clear and symmetric.    Neck: Supple. Full ROM. No lymphadenopathy.  Cardiovascular: Regular rate. Regular rhythm. No murmurs, rubs, or gallops. Friction rub.   Pulmonary/Chest: No respiratory distress. Clear to auscultation bilaterally. No wheezing or rales.  Abdominal: Soft and non-distended.  There is no tenderness.  Musculoskeletal: Moves all extremities. No obvious deformities. No edema. No calf tenderness.  Skin: Warm and dry.  Neurological:  Alert, awake, and appropriate.  Normal speech.  No acute focal neurological deficits are appreciated.  Psychiatric: Normal affect. Good eye contact. Appropriate in content.     ED Course   Procedures  ED Vital Signs:  Vitals:    11/13/18 1010 11/13/18 1012 11/13/18 1015 11/13/18 1019   BP:   137/84    Pulse: 80  80    Resp:       Temp:    98.4 °F (36.9 °C)   TempSrc:    Oral   SpO2:       Weight:  65.8 kg (145 lb 1 oz)      11/13/18 1020 11/13/18 1030 11/13/18 1045 11/13/18 1100   BP:  (!) 141/94  (!) 145/91   Pulse: 80 83 83 91   Resp:  20 20 19   Temp:       TempSrc:       SpO2:  99% 99% 99%   Weight:        11/13/18 1115 11/13/18 1130 11/13/18 1200   BP:  (!) 150/97 (!) 145/86   Pulse: 81 77 76   Resp: 19 20 20   Temp:      TempSrc:       SpO2: 99% 99% 98%   Weight:          Abnormal Lab Results:  Labs Reviewed   CBC W/ AUTO DIFFERENTIAL - Abnormal; Notable for the following components:       Result Value    Gran # (ANC) 7.8 (*)     All other components within normal limits   COMPREHENSIVE METABOLIC PANEL   B-TYPE NATRIURETIC PEPTIDE   TROPONIN I   PROTIME-INR        All Lab Results:  Results for orders placed or performed during the hospital encounter of 11/13/18   CBC auto differential   Result Value Ref Range    WBC 11.65 3.90 - 12.70 K/uL    RBC 5.62 4.60 - 6.20 M/uL    Hemoglobin 16.6 14.0 - 18.0 g/dL    Hematocrit 47.0 40.0 - 54.0 %    MCV 84 82 - 98 fL    MCH 29.5 27.0 - 31.0 pg    MCHC 35.3 32.0 - 36.0 g/dL    RDW 13.3 11.5 - 14.5 %    Platelets 251 150 - 350 K/uL    MPV 9.4 9.2 - 12.9 fL    Gran # (ANC) 7.8 (H) 1.8 - 7.7 K/uL    Lymph # 2.8 1.0 - 4.8 K/uL    Mono # 0.8 0.3 - 1.0 K/uL    Eos # 0.2 0.0 - 0.5 K/uL    Baso # 0.04 0.00 - 0.20 K/uL    Gran% 66.8 38.0 - 73.0 %    Lymph% 24.4 18.0 - 48.0 %    Mono% 7.2 4.0 - 15.0 %    Eosinophil% 1.3 0.0 - 8.0 %    Basophil% 0.3 0.0 - 1.9 %    Differential Method Automated    Comprehensive metabolic panel   Result Value Ref Range    Sodium 141 136 - 145 mmol/L    Potassium 5.0 3.5 - 5.1 mmol/L    Chloride 108 95 - 110 mmol/L    CO2 23 23 - 29 mmol/L    Glucose 88 70 - 110 mg/dL    BUN, Bld 10 6 - 20 mg/dL    Creatinine 1.0 0.5 - 1.4 mg/dL    Calcium 9.9 8.7 - 10.5 mg/dL    Total Protein 8.0 6.0 - 8.4 g/dL    Albumin 4.4 3.5 - 5.2 g/dL    Total Bilirubin 0.4 0.1 - 1.0 mg/dL    Alkaline Phosphatase 77 55 - 135 U/L    AST 37 10 - 40 U/L    ALT 17 10 - 44 U/L    Anion Gap 10 8 - 16 mmol/L    eGFR if African American >60 >60 mL/min/1.73 m^2    eGFR if non African American >60 >60 mL/min/1.73 m^2   Brain natriuretic peptide   Result Value Ref Range    BNP 22 0 - 99 pg/mL   Troponin I   Result Value Ref Range    Troponin I <0.006 0.000 - 0.026 ng/mL   Protime-INR   Result Value Ref Range    Prothrombin  Time 10.1 9.0 - 12.5 sec    INR 1.0 0.8 - 1.2         Imaging Results:  Imaging Results          X-Ray Chest PA And Lateral (Final result)  Result time 11/13/18 10:57:55    Final result by Torey Randle MD (11/13/18 10:57:55)                 Impression:      No acute process seen.      Electronically signed by: Torey Randle MD  Date:    11/13/2018  Time:    10:57             Narrative:    EXAMINATION:  XR CHEST PA AND LATERAL    CLINICAL HISTORY:  Chest Pain;    COMPARISON:  None    FINDINGS:  Cardiac silhouette is normal.  The lungs demonstrate no evidence of active disease.  No evidence of pleural effusion or pneumothorax.  Bones appear intact.                                 The EKG was ordered, reviewed, and independently interpreted by the ED provider.  Interpretation time: 10:07  Rate: 82 BPM  Rhythm: normal sinus rhythm  Interpretation: Normal axis. No prolonged intervals. No [place] depression. No ST elevation. No ST depression. No STEMI.           The Emergency Provider reviewed the vital signs and test results, which are outlined above.     ED Discussion     12:13 PM: Re-evaluated pt. Pt is resting comfortably and is in no acute distress.  Pt reports he was partially prompted to come in for evaluation today because he looked up colchicine on the Internet and saw it was used to treat gout which made him think he was rx the wrong medication. Pt also reports he is only taking ibuprofen x1 daily. D/w pt all pertinent results. D/w pt any concerns expressed at this time. Answered all questions. Pt expresses understanding at this time.    12:32 PM: No pericardial effusion appreciated via bedside US.       12:45 PM: Reassessed pt at this time. Pt is awake, alert, and in NAD. Pt states his condition has improved at this time. Discussed with pt all pertinent ED information and results. Discussed pt dx and plan of tx. I advised pt to f/u with cardiology as [facility] out opt for further evaluation of his sxs. Gave pt all f/u  and return to the ED instructions. All questions and concerns were addressed at this time. Pt expresses understanding of information and instructions, and is comfortable with plan to discharge. Pt is stable for discharge.    I discussed with patient and/or family/caretaker that evaluation in the ED does not suggest any emergent or life threatening medical conditions requiring immediate intervention beyond what was provided in the ED, and I believe patient is safe for discharge.  Regardless, an unremarkable evaluation in the ED does not preclude the development or presence of a serious of life threatening condition. As such, patient was instructed to return immediately for any worsening or change in current symptoms.    I have discussed with patient and/or family/caretaker chest pain precautions, specifically to return for worsening chest pain, shortness of breath, fever, or any concern.  I have low suspicion for cardiopulmonary, vascular, infectious, respiratory, or other emergent medical condition based on my evaluation in the ED.      ED Medication(s):  Medications   ibuprofen tablet 600 mg (not administered)       Current Discharge Medication List      START taking these medications    Details   colchicine (COLCRYS) 0.6 mg tablet Take 1 tablet (0.6 mg total) by mouth 2 (two) times daily.  Qty: 60 tablet, Refills: 2               Follow-up Information     Mirella Messer MD In 1 week.    Specialty:  Cardiology  Contact information:  [address]  [place] [address] [phone]             [facility] .    Specialty:  Emergency Medicine  Why:  As needed, If symptoms worsen  Contact information:  [address] [facility] [address] [phone]                       Medical Decision Making:   Clinical Tests:   Lab Tests: Ordered and Reviewed  Radiological Study: Ordered and Reviewed  Medical Tests: Ordered and Reviewed  Patient with a known diagnosis of pericarditis  (diagnosed yesterday at outside facility and a few months ago); patient initially had his symptoms resolved months ago and they have recurred.  History and physical highly suggestive of pericarditis.  Patient was prescribed 1 week worth of colchicine and ibuprofen at outside facility yesterday.  The patient will be prescribed the traditional 3 months of colchicine and advised NSAIDs.  Patient educated about how to appropriately take NSAIDs and taper them off as needed in an attempt to avoid recurrence.  Bedside ultrasound performed no pericardial effusion is seen.  Troponin not elevated.  Patient is candidate for outpatient treatment.  Patient given cardiology follow-up information, and educated about potential side effects to see with prolonged NSAIDs and colchicine.  ER return precautions provided             Scribe Attestation:   Scribe #1: I performed the above scribed service and the documentation accurately describes the services I performed. I attest to the accuracy of the note.     Attending:   Physician Attestation Statement for Scribe #1: I, Torey Tolentino MD, personally performed the services described in this documentation, as scribed by Charan Mace, in my presence, and it is both accurate and complete.           Clinical Impression       ICD-10-CM ICD-9-CM   1. Acute pericarditis, unspecified type I30.9 420.90       Disposition:   Disposition: Discharged  Condition: Stable         Torey Tolentino MD  11/13/18 2439

## 2018-11-13 NOTE — DISCHARGE INSTRUCTIONS
Take over-the-counter ibuprofen 600 mg 3 times a day (every 8 hr) until symptoms resolve.  Once they resolve, take ibuprofen 400 mg twice a day for 1 week and then stop taking ibuprofen.  Year colchicine prescription is for 3 months.  Call Cardiology to arrange an appointment.  If unable to see cardiology, in you need to at least see primary care physician for follow-up. Colchicine prescription is for 3 months to reduce recurrence

## 2018-12-13 ENCOUNTER — HOSPITAL ENCOUNTER (EMERGENCY)
Facility: HOSPITAL | Age: 28
Discharge: HOME | End: 2018-12-13
Attending: EMERGENCY MEDICINE
Payer: MEDICAID

## 2018-12-13 VITALS
SYSTOLIC BLOOD PRESSURE: 165 MMHG | DIASTOLIC BLOOD PRESSURE: 94 MMHG | WEIGHT: 152.31 LBS | OXYGEN SATURATION: 98 % | HEIGHT: 68 IN | BODY MASS INDEX: 23.08 KG/M2 | RESPIRATION RATE: 18 BRPM | TEMPERATURE: 98 F | HEART RATE: 84 BPM

## 2018-12-13 DIAGNOSIS — R05.9 COUGH: ICD-10-CM

## 2018-12-13 DIAGNOSIS — J02.9 SORE THROAT: ICD-10-CM

## 2018-12-13 DIAGNOSIS — J06.9 VIRAL UPPER RESPIRATORY TRACT INFECTION: Primary | ICD-10-CM

## 2018-12-13 PROCEDURE — 99284 EMERGENCY DEPT VISIT MOD MDM: CPT | Mod: 25

## 2018-12-13 PROCEDURE — 96372 THER/PROPH/DIAG INJ SC/IM: CPT

## 2018-12-13 PROCEDURE — 63600175 PHARM REV CODE 636 W HCPCS: Performed by: REGISTERED NURSE

## 2018-12-13 RX ORDER — DEXAMETHASONE SODIUM PHOSPHATE 4 MG/ML
8 INJECTION, SOLUTION INTRA-ARTICULAR; INTRALESIONAL; INTRAMUSCULAR; INTRAVENOUS; SOFT TISSUE
Status: COMPLETED | OUTPATIENT
Start: 2018-12-13 | End: 2018-12-13

## 2018-12-13 RX ORDER — IBUPROFEN 800 MG/1
800 TABLET ORAL EVERY 6 HOURS PRN
Qty: 20 TABLET | Refills: 0 | Status: SHIPPED | OUTPATIENT
Start: 2018-12-13 | End: 2019-02-27 | Stop reason: CLARIF

## 2018-12-13 RX ORDER — PROMETHAZINE HYDROCHLORIDE AND DEXTROMETHORPHAN HYDROBROMIDE 6.25; 15 MG/5ML; MG/5ML
5 SYRUP ORAL EVERY 6 HOURS PRN
Qty: 150 ML | Refills: 0 | Status: SHIPPED | OUTPATIENT
Start: 2018-12-13 | End: 2018-12-23

## 2018-12-13 RX ORDER — FLUTICASONE PROPIONATE 50 MCG
1 SPRAY, SUSPENSION (ML) NASAL 2 TIMES DAILY PRN
Qty: 15 G | Refills: 0 | Status: SHIPPED | OUTPATIENT
Start: 2018-12-13 | End: 2019-02-27 | Stop reason: CLARIF

## 2018-12-13 RX ORDER — ALBUTEROL SULFATE 90 UG/1
1-2 AEROSOL, METERED RESPIRATORY (INHALATION) EVERY 6 HOURS PRN
Qty: 1 INHALER | Refills: 0 | Status: SHIPPED | OUTPATIENT
Start: 2018-12-13 | End: 2019-01-31 | Stop reason: SDUPTHER

## 2018-12-13 RX ADMIN — DEXAMETHASONE SODIUM PHOSPHATE 8 MG: 4 INJECTION, SOLUTION INTRAMUSCULAR; INTRAVENOUS at 06:12

## 2018-12-13 NOTE — ED PROVIDER NOTES
SCRIBE #1 NOTE: I, Geri Chloé, am scribing for, and in the presence of, Margarito Beckford Jr., NP. I have scribed the entire note.      History      Chief Complaint   Patient presents with    Cough     cough, sore throat, diarrhea x3 days       Review of patient's allergies indicates:  No Known Allergies     HPI   HPI    12/13/2018, 5:51 PM   History obtained from the patient      History of Present Illness: Kwan Palacio is a 28 y.o. male patient who presents to the Emergency Department for cough which onset gradually several days ago. Symptoms are intermittent and moderate in severity.  No mitigating or exacerbating factors reported. Associated sxs include sore throat and sinus pressure. Pt is also c/o diarrhea which onset about 4 days ago. Patient denies any CP, SOB, trouble swallowing, congestion, abd pain, fever, chills, constipation, hematochezia, dysuria, hematuria, urinary frequency, n/v, HA, dizziness, visual disturbance, neck pain/stiffness, myalgias, and all other sxs at this time. Prior tx includes Mucinex and Zicam. No further complaints or concerns at this time.         Arrival mode: Personal vehicle      PCP: Primary Doctor No       Past Medical History:  Past Medical History:   Diagnosis Date    ADHD (attention deficit hyperactivity disorder)     Depression     Dislocation of shoulder, left, closed     Hx of psychiatric care     Psychiatric problem        Past Surgical History:  Past Surgical History:   Procedure Laterality Date    PLACEMENT-NEOPERICARDIUM N/A 7/21/2017    Performed by [facility] at [facility] OR [facility]    REPAIR AORTIC ROOT N/A 7/21/2017    Performed by [facility]em [facility] at [facility] OR [facility]    REPAIR VALVE-MITRAL N/A 7/21/2017    Performed by [facility] MD at [facility] OR [facility]    REPLACEMENT-VALVE-AORTIC N/A 7/21/2017    Performed by [facility]em Fitch MD at [facility] OR [address] [place] [address]         Family History:  History reviewed. No pertinent family  history.    Social History:  Social History     Tobacco Use    Smoking status: Never Smoker    Smokeless tobacco: Current User     Types: Snuff, Chew   Substance and Sexual Activity    Alcohol use: Yes     Comment: occasional    Drug use: Yes     Types: Methamphetamines, MDMA (Ecstacy), Benzodiazepines    Sexual activity: Yes     Partners: Female       ROS   Review of Systems   Constitutional: Negative for activity change, chills, diaphoresis and fever.   HENT: Positive for sinus pressure and sore throat. Negative for congestion, drooling, ear pain, rhinorrhea, sneezing and trouble swallowing.    Eyes: Negative for pain.   Respiratory: Positive for cough. Negative for chest tightness, shortness of breath, wheezing and stridor.    Cardiovascular: Negative for chest pain, palpitations and leg swelling.   Gastrointestinal: Positive for diarrhea. Negative for abdominal distention, abdominal pain, constipation, nausea and vomiting.   Genitourinary: Negative for difficulty urinating, dysuria, frequency and urgency.   Musculoskeletal: Negative for arthralgias, back pain, myalgias, neck pain and neck stiffness.   Skin: Negative for pallor, rash and wound.   Neurological: Negative for dizziness, syncope, weakness, light-headedness, numbness and headaches.   Hematological: Does not bruise/bleed easily.   All other systems reviewed and are negative.      Physical Exam      Initial Vitals [12/13/18 1745]   BP Pulse Resp Temp SpO2   (!) 173/102 98 20 98.1 °F (36.7 °C) 98 %      MAP       --          Physical Exam  Nursing Notes and Vital Signs Reviewed.  Constitutional: Patient is in no acute distress. Well-developed and well-nourished.  Head: Atraumatic. Normocephalic.  Eyes: PERRL. EOM intact. Conjunctivae are not pale. No scleral icterus.  ENT: Mucous membranes are moist. Scar to R and L TM's. Posterior oropharynx is symmetric with mild erythema. Tonsillar exudate is not present. No tonsillar edema. No trismus. Normal  "handling of secretions. No stridor.  Neck: Supple. Full ROM. No lymphadenopathy.  Cardiovascular: Regular rate. Regular rhythm. No murmurs, rubs, or gallops. Distal pulses are 2+ and symmetric.  Pulmonary/Chest: No respiratory distress. Clear to auscultation bilaterally. No wheezing or rales.  Abdominal: Soft and non-distended.  There is no tenderness.  No rebound, guarding, or rigidity.   Musculoskeletal: Moves all extremities. No obvious deformities. No edema.   Skin: Warm and dry.  Neurological:  Alert, awake, and appropriate.  Normal speech.  No acute focal neurological deficits are appreciated.  Psychiatric: Normal affect. Good eye contact. Appropriate in content.    ED Course    Procedures  ED Vital Signs:  Vitals:    12/13/18 1745   BP: (!) 173/102   Pulse: 98   Resp: 20   Temp: 98.1 °F (36.7 °C)   TempSrc: Oral   SpO2: 98%   Weight: 69.1 kg (152 lb 5.4 oz)   Height: 5' 8" (1.727 m)       Abnormal Lab Results:  Labs Reviewed - No data to display     All Lab Results:      Imaging Results:  Imaging Results    None                 The Emergency Provider reviewed the vital signs and test results, which are outlined above.    ED Discussion     6:16 PM: Reassessed pt at this time.  Pt is awake, alert, and in NAD at this time. Discussed with pt all pertinent ED information and results. Discussed pt dx and plan of tx. Gave pt all f/u and return to the ED instructions. All questions and concerns were addressed at this time. Pt expresses understanding of information and instructions, and is comfortable with plan to discharge. Pt is stable for discharge.    Pre-hypertension/Hypertension: The pt has been informed that they may have pre-hypertension or hypertension based on a blood pressure reading in the ED. I recommend that the pt call the PCP listed on their discharge instructions or a physician of their choice this week to arrange f/u for further evaluation of possible pre-hypertension or hypertension.     I discussed " with patient and/or family/caretaker that evaluation in the ED does not suggest any emergent or life threatening medical conditions requiring immediate intervention beyond what was provided in the ED, and I believe patient is safe for discharge.  Regardless, an unremarkable evaluation in the ED does not preclude the development or presence of a serious of life threatening condition. As such, patient was instructed to return immediately for any worsening or change in current symptoms.    Current Discharge Medication List      START taking these medications    Details   albuterol (PROVENTIL/VENTOLIN HFA) 90 mcg/actuation inhaler Inhale 1-2 puffs into the lungs every 6 (six) hours as needed for Wheezing. Rescue  Qty: 1 Inhaler, Refills: 0      fluticasone (FLONASE) 50 mcg/actuation nasal spray 1 spray (50 mcg total) by Each Nare route 2 (two) times daily as needed.  Qty: 15 g, Refills: 0      ibuprofen (ADVIL,MOTRIN) 800 MG tablet Take 1 tablet (800 mg total) by mouth every 6 (six) hours as needed for Pain.  Qty: 20 tablet, Refills: 0      promethazine-dextromethorphan (PROMETHAZINE-DM) 6.25-15 mg/5 mL Syrp Take 5 mLs by mouth every 6 (six) hours as needed.  Qty: 150 mL, Refills: 0         CONTINUE these medications which have NOT CHANGED    Details   colchicine (COLCRYS) 0.6 mg tablet Take 1 tablet (0.6 mg total) by mouth 2 (two) times daily.  Qty: 60 tablet, Refills: 2      nicotine (NICODERM CQ) 7 mg/24 hr Place 1 patch onto the skin once daily.  Refills: 0             ED Medication(s):  Medications   dexamethasone injection 8 mg (not administered)       Follow-up Information     [facility]    Specialty:  Emergency Medicine  Why:  If symptoms worsen  Contact information:  [address]  [facility] [address] [phone]                   Medical Decision Making              Scribe Attestation:   Scribe #1: I performed the above scribed service and the documentation  accurately describes the services I performed. I attest to the accuracy of the note.    Attending:   Physician Attestation Statement for Scribe #1: I, Margarito Beckford Jr., NP, personally performed the services described in this documentation, as scribed by Geri Luevano, in my presence, and it is both accurate and complete.          Clinical Impression       ICD-10-CM ICD-9-CM   1. Viral upper respiratory tract infection J06.9 465.9   2. Cough R05 786.2   3. Sore throat J02.9 462       Disposition:   Disposition: Discharged  Condition: Stable         Margarito Beckford Jr., [facility]  12/13/18 7989

## 2019-01-31 ENCOUNTER — OFFICE VISIT (OUTPATIENT)
Dept: URGENT CARE | Facility: CLINIC | Age: 29
End: 2019-01-31
Payer: MEDICAID

## 2019-01-31 VITALS
RESPIRATION RATE: 16 BRPM | HEIGHT: 68 IN | HEART RATE: 87 BPM | WEIGHT: 153.88 LBS | TEMPERATURE: 99 F | OXYGEN SATURATION: 98 % | BODY MASS INDEX: 23.32 KG/M2

## 2019-01-31 DIAGNOSIS — R01.1 HEART MURMUR: ICD-10-CM

## 2019-01-31 DIAGNOSIS — J40 BRONCHITIS: Primary | ICD-10-CM

## 2019-01-31 PROCEDURE — 99214 PR OFFICE/OUTPT VISIT, EST, LEVL IV, 30-39 MIN: ICD-10-PCS | Mod: S$PBB,,, | Performed by: FAMILY MEDICINE

## 2019-01-31 PROCEDURE — 99214 OFFICE O/P EST MOD 30 MIN: CPT | Mod: S$PBB,,, | Performed by: FAMILY MEDICINE

## 2019-01-31 PROCEDURE — 99999 PR PBB SHADOW E&M-EST. PATIENT-LVL IV: ICD-10-PCS | Mod: PBBFAC,,, | Performed by: FAMILY MEDICINE

## 2019-01-31 PROCEDURE — 99214 OFFICE O/P EST MOD 30 MIN: CPT | Mod: PBBFAC,PN | Performed by: FAMILY MEDICINE

## 2019-01-31 PROCEDURE — 99999 PR PBB SHADOW E&M-EST. PATIENT-LVL IV: CPT | Mod: PBBFAC,,, | Performed by: FAMILY MEDICINE

## 2019-01-31 RX ORDER — ALBUTEROL SULFATE 90 UG/1
1-2 AEROSOL, METERED RESPIRATORY (INHALATION) EVERY 6 HOURS PRN
Qty: 1 INHALER | Refills: 0 | Status: SHIPPED | OUTPATIENT
Start: 2019-01-31 | End: 2019-02-27 | Stop reason: CLARIF

## 2019-01-31 RX ORDER — METHYLPREDNISOLONE ACETATE 80 MG/ML
80 INJECTION, SUSPENSION INTRA-ARTICULAR; INTRALESIONAL; INTRAMUSCULAR; SOFT TISSUE ONCE
Status: COMPLETED | OUTPATIENT
Start: 2019-01-31 | End: 2019-01-31

## 2019-01-31 RX ORDER — ALBUTEROL SULFATE 90 UG/1
1-2 AEROSOL, METERED RESPIRATORY (INHALATION) EVERY 6 HOURS PRN
Qty: 1 INHALER | Refills: 0 | Status: SHIPPED | OUTPATIENT
Start: 2019-01-31 | End: 2019-01-31 | Stop reason: SDUPTHER

## 2019-01-31 RX ADMIN — METHYLPREDNISOLONE ACETATE 80 MG: 80 INJECTION, SUSPENSION INTRA-ARTICULAR; INTRALESIONAL; INTRAMUSCULAR; SOFT TISSUE at 06:01

## 2019-01-31 NOTE — PROGRESS NOTES
"Subjective:       Patient ID: Kwan Palacio is a 28 y.o. male.    Chief Complaint: Cough and Chest Congestion    Pulse 87   Temp 99 °F (37.2 °C) (Tympanic)   Resp 16   Ht 5' 8" (1.727 m)   Wt 69.8 kg (153 lb 14.1 oz)   SpO2 98%   BMI 23.40 kg/m²     HPI  Cough and congestion for 2 weeks, feeling bad. Has fever. Seen at another clinic recently, put on amoxil, still taking. Smokes cigarrettes  Resident of a court ordered recovery facility, clean for a few months  He has heart valve replacement a few years ago. Hx of recurrent endocarditis.     Review of Systems   Constitutional: Positive for fatigue and fever.   Respiratory: Positive for cough and wheezing.        Objective:      Physical Exam   Constitutional: He is oriented to person, place, and time. He appears well-developed and well-nourished. No distress.   HENT:   Head: Normocephalic and atraumatic.   Mouth/Throat: No oropharyngeal exudate.   Eyes: EOM are normal. Pupils are equal, round, and reactive to light. Right eye exhibits no discharge. Left eye exhibits no discharge.   Neck: Normal range of motion. Neck supple.   Cardiovascular: Regular rhythm.   Murmur (loud systolic murmur, pt is not aware) heard.  Pulmonary/Chest: Effort normal. No respiratory distress. He has no wheezes. He has no rales.   BS - mildly decreased   Musculoskeletal: Normal range of motion.   Lymphadenopathy:     He has no cervical adenopathy.   Neurological: He is alert and oriented to person, place, and time.   Skin: Skin is warm and dry. He is not diaphoretic.   Nursing note and vitals reviewed.      Assessment:       1. Bronchitis    2. Heart murmur - extensive cardiac hx, s/p surgery       Plan:     Kwan was seen today for cough and chest congestion.    Diagnoses and all orders for this visit:    Bronchitis  -     Discontinue: albuterol (PROVENTIL/VENTOLIN HFA) 90 mcg/actuation inhaler; Inhale 1-2 puffs into the lungs every 6 (six) hours as needed for Wheezing. " Rescue  -     methylPREDNISolone acetate injection 80 mg  -     albuterol (PROVENTIL/VENTOLIN HFA) 90 mcg/actuation inhaler; Inhale 1-2 puffs into the lungs every 6 (six) hours as needed for Wheezing.    Heart murmur       1. You received steroid injection today  2. Rx albuterol inhaler, use as needed for wheezing, shortness of breath or dry cough  3. Continue your amoxil as prescribed by your other clinic  4. Follow up with your cardiologist for heart murmur  5. Go to ER if feeling worse

## 2019-01-31 NOTE — PATIENT INSTRUCTIONS
1. You received steroid injection today  2. Rx albuterol inhaler, use as needed for wheezing, shortness of breath or dry cough  3. Continue your amoxil as prescribed by your other clinic  4. Follow up with your cardiologist for heart murmur  5. Go to ER if feeling worse

## 2019-01-31 NOTE — LETTER
January 31, 2019      [facility]  [address]  [place] [address]  Phone: [phone]  Fax: [phone]       Patient: Kwan Palacio   YOB: 1990  Date of Visit: 01/31/2019    To Whom It May Concern:    Wilner Palacio  was at [facility] on 01/31/2019. He may return to work/school on 2/2/19 with no restrictions. If you have any questions or concerns, or if I can be of further assistance, please do not hesitate to contact me.    Sincerely,    MD Joslyn Calderon LPN

## 2019-02-03 ENCOUNTER — HOSPITAL ENCOUNTER (EMERGENCY)
Facility: HOSPITAL | Age: 29
Discharge: HOME | End: 2019-02-04
Attending: EMERGENCY MEDICINE
Payer: MEDICAID

## 2019-02-03 DIAGNOSIS — R01.1 SYSTOLIC MURMUR: ICD-10-CM

## 2019-02-03 DIAGNOSIS — J40 BRONCHITIS: ICD-10-CM

## 2019-02-03 DIAGNOSIS — R05.9 COUGH: ICD-10-CM

## 2019-02-03 LAB
ALBUMIN SERPL BCP-MCNC: 4.2 G/DL
ALP SERPL-CCNC: 81 U/L
ALT SERPL W/O P-5'-P-CCNC: 20 U/L
ANION GAP SERPL CALC-SCNC: 12 MMOL/L
AST SERPL-CCNC: 38 U/L
BASOPHILS # BLD AUTO: 0.02 K/UL
BASOPHILS NFR BLD: 0.2 %
BILIRUB SERPL-MCNC: 0.4 MG/DL
BUN SERPL-MCNC: 16 MG/DL
CALCIUM SERPL-MCNC: 9.4 MG/DL
CHLORIDE SERPL-SCNC: 106 MMOL/L
CO2 SERPL-SCNC: 22 MMOL/L
CREAT SERPL-MCNC: 1 MG/DL
DIFFERENTIAL METHOD: ABNORMAL
EOSINOPHIL # BLD AUTO: 0.1 K/UL
EOSINOPHIL NFR BLD: 1 %
ERYTHROCYTE [DISTWIDTH] IN BLOOD BY AUTOMATED COUNT: 13.9 %
EST. GFR  (AFRICAN AMERICAN): >60 ML/MIN/1.73 M^2
EST. GFR  (NON AFRICAN AMERICAN): >60 ML/MIN/1.73 M^2
GLUCOSE SERPL-MCNC: 93 MG/DL
HCT VFR BLD AUTO: 46.1 %
HGB BLD-MCNC: 16.2 G/DL
INFLUENZA A, MOLECULAR: NEGATIVE
INFLUENZA B, MOLECULAR: NEGATIVE
LACTATE SERPL-SCNC: 1 MMOL/L
LYMPHOCYTES # BLD AUTO: 2.4 K/UL
LYMPHOCYTES NFR BLD: 21.1 %
MCH RBC QN AUTO: 30.7 PG
MCHC RBC AUTO-ENTMCNC: 35.1 G/DL
MCV RBC AUTO: 88 FL
MONOCYTES # BLD AUTO: 0.9 K/UL
MONOCYTES NFR BLD: 7.6 %
NEUTROPHILS # BLD AUTO: 8.1 K/UL
NEUTROPHILS NFR BLD: 70.1 %
PLATELET # BLD AUTO: 274 K/UL
PMV BLD AUTO: 9.8 FL
POTASSIUM SERPL-SCNC: 4.2 MMOL/L
PROT SERPL-MCNC: 7.2 G/DL
RBC # BLD AUTO: 5.27 M/UL
SODIUM SERPL-SCNC: 140 MMOL/L
SPECIMEN SOURCE: NORMAL
WBC # BLD AUTO: 11.52 K/UL

## 2019-02-03 PROCEDURE — 96365 THER/PROPH/DIAG IV INF INIT: CPT

## 2019-02-03 PROCEDURE — 63600175 PHARM REV CODE 636 W HCPCS: Performed by: EMERGENCY MEDICINE

## 2019-02-03 PROCEDURE — 80053 COMPREHEN METABOLIC PANEL: CPT

## 2019-02-03 PROCEDURE — 85025 COMPLETE CBC W/AUTO DIFF WBC: CPT

## 2019-02-03 PROCEDURE — 36415 COLL VENOUS BLD VENIPUNCTURE: CPT

## 2019-02-03 PROCEDURE — 87502 INFLUENZA DNA AMP PROBE: CPT

## 2019-02-03 PROCEDURE — 87040 BLOOD CULTURE FOR BACTERIA: CPT

## 2019-02-03 PROCEDURE — 93010 ELECTROCARDIOGRAM REPORT: CPT | Mod: ,,, | Performed by: INTERNAL MEDICINE

## 2019-02-03 PROCEDURE — 96366 THER/PROPH/DIAG IV INF ADDON: CPT

## 2019-02-03 PROCEDURE — 96367 TX/PROPH/DG ADDL SEQ IV INF: CPT

## 2019-02-03 PROCEDURE — 83605 ASSAY OF LACTIC ACID: CPT

## 2019-02-03 PROCEDURE — 93010 EKG 12-LEAD: ICD-10-PCS | Mod: ,,, | Performed by: INTERNAL MEDICINE

## 2019-02-03 PROCEDURE — 99285 EMERGENCY DEPT VISIT HI MDM: CPT | Mod: 25

## 2019-02-03 PROCEDURE — 93005 ELECTROCARDIOGRAM TRACING: CPT

## 2019-02-03 RX ORDER — CEFEPIME HYDROCHLORIDE 2 G/50ML
2 INJECTION, SOLUTION INTRAVENOUS
Status: COMPLETED | OUTPATIENT
Start: 2019-02-03 | End: 2019-02-03

## 2019-02-03 RX ADMIN — CEFEPIME HYDROCHLORIDE 2 G: 2 INJECTION, SOLUTION INTRAVENOUS at 10:02

## 2019-02-03 RX ADMIN — VANCOMYCIN HYDROCHLORIDE 1750 MG: 750 INJECTION, POWDER, LYOPHILIZED, FOR SOLUTION INTRAVENOUS at 10:02

## 2019-02-04 VITALS
HEART RATE: 85 BPM | OXYGEN SATURATION: 17 % | HEIGHT: 68 IN | WEIGHT: 152.13 LBS | DIASTOLIC BLOOD PRESSURE: 92 MMHG | BODY MASS INDEX: 23.05 KG/M2 | RESPIRATION RATE: 17 BRPM | SYSTOLIC BLOOD PRESSURE: 155 MMHG | TEMPERATURE: 98 F

## 2019-02-04 PROBLEM — J40 BRONCHITIS: Status: ACTIVE | Noted: 2019-02-04

## 2019-02-04 PROBLEM — R01.1 MURMUR, HEART: Status: ACTIVE | Noted: 2019-02-04

## 2019-02-04 PROCEDURE — 27100098 HC SPACER

## 2019-02-04 RX ORDER — GUAIFENESIN 600 MG/1
600 TABLET, EXTENDED RELEASE ORAL 2 TIMES DAILY PRN
Qty: 20 TABLET | Refills: 0 | Status: SHIPPED | OUTPATIENT
Start: 2019-02-04 | End: 2019-02-27 | Stop reason: CLARIF

## 2019-02-04 RX ORDER — BENZONATATE 200 MG/1
200 CAPSULE ORAL 3 TIMES DAILY PRN
Qty: 20 CAPSULE | Refills: 0 | Status: SHIPPED | OUTPATIENT
Start: 2019-02-04 | End: 2019-02-14

## 2019-02-04 NOTE — ASSESSMENT & PLAN NOTE
- systolic and diastolic, from h/o AVR and MVR  - no leukocytosis, no systemic signs of infection to suggest endocarditis  - blood cultures collected in ER, if cx positive will follow up with patient after discharge  - follow up with outpatient cardiology

## 2019-02-04 NOTE — ASSESSMENT & PLAN NOTE
- continue full course of amoxil  - continue inhaler use, will prescribe spacer and education for use per RT  - start tessalon pearls TID prn cough, and mucinex 600mg q12h  - if sx worsen, return to ER or go to PCP for further follow up of symptoms  - follow up with cardiology annually for check up, patient reports understanding

## 2019-02-04 NOTE — PROVIDER PROGRESS NOTES - EMERGENCY DEPT.
Encounter Date: 2/3/2019    ED Physician Progress Notes        Update Note: Patient was initially evaluated by Dr. Thomas. Dr. Thomas thought patient should be admitted for further testing for endocarditis rule out. Dr. Chow of [facility] medicine thought patient did not warrant admission. Dr. Chow evaluated patient at bedside in ED and provided outpatient treatment recommendations and declined admission. Dr. Chow stated because of technical reasons, she cannot discharge a patient from the ER, so she asked that I print out the prescriptions she requested and click discharge. By this time, Dr. Thomas has left, so I prescribed the medications Dr. Chow wanted and spoke with the patient to make sure he understood discharge instructions and return precautions.  Patient voiced understanding and is comfortable with discharge at this time.  Patient will be called if blood cultures are positive. I did not partake in the medical decision making of this patient.

## 2019-02-04 NOTE — ED PROVIDER NOTES
SCRIBE #1 NOTE: I, Neela Atkins, am scribing for, and in the presence of, Abdirashid Thomas MD. I have scribed the entire note.         History     Chief Complaint   Patient presents with    Cough     cough, chills, weakness, subjective fever, diarrhea x1 week       Review of patient's allergies indicates:  No Known Allergies      History of Present Illness   HPI    2/3/2019, 8:50 PM  History obtained from the patient      History of Present Illness: Kwan Palacio is a 28 y.o. male patient with PMHx of endocarditis and mitral valve replacement who presents to the Emergency Department for cough which onset gradually a week ago. Symptoms are persistent and moderate in severity. Patient states sxs feel similar to when he previously had endocarditis. No mitigating or exacerbating factors reported. Associated sxs include chills, fever (Tmax 101F), generalized weakness, and diarrhea. Patient denies any HA, dizziness, CP, SOB, congestion, rhinorrhea, sore throat, abd pain, N/V, and all other sxs at this time. Patient states he has been on doxycycline PO for the past week. Patient reports hx of endocarditis 2 years ago that required 6 months of hospitalization. Patient had mitral valve replacement 1.5 years ago. Patient admits to hx of illicit drug abuse, but states he has been sober for the last 4 months. No further complaints or concerns at this time.     Arrival mode: Personal vehicle     PCP: Primary Doctor No        Past Medical History:  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    Hx of psychiatric care     Psychiatric problem        Past Surgical History:  Past Surgical History:   Procedure Laterality Date    CARDIAC VALVE REPLACEMENT  2017    pig valve    PLACEMENT-NEOPERICARDIUM N/A 7/21/2017    [facility] at [facility] 2ND F[facility]    REPAIR  AORTIC ROOT N/A 7/21/2017    Performed by [facility] at [facility] OR 2ND FLR    REPAIR VALVE-MITRAL N/A 7/21/2017    Performed by [facility] at [facility] OR 2ND FLR    REPLACEMENT-VALVE-AORTIC N/A 7/21/2017    Performed by Naeem Fitch MD at [facility] OR 2ND FLR         Family History:  History reviewed. No pertinent family history.    Social History:  Social History     Tobacco Use    Smoking status: Never Smoker    Smokeless tobacco: Current User     Types: Snuff, Chew   Substance and Sexual Activity    Alcohol use: Yes     Comment: occasional    Drug use: Yes     Types: Methamphetamines, MDMA (Ecstacy), Benzodiazepines    Sexual activity: Yes     Partners: Female        Review of Systems   Review of Systems   Constitutional: Positive for chills and fever (Tmax 101F).        (+) generalized weakness   HENT: Negative for sore throat.    Respiratory: Positive for cough. Negative for shortness of breath.    Cardiovascular: Negative for chest pain.   Gastrointestinal: Positive for diarrhea. Negative for abdominal pain, nausea and vomiting.   Genitourinary: Negative for dysuria.   Musculoskeletal: Negative for back pain.   Skin: Negative for rash.   Neurological: Negative for dizziness, weakness and headaches.   Hematological: Does not bruise/bleed easily.   All other systems reviewed and are negative.       Physical Exam     Initial Vitals [02/03/19 1954]   BP Pulse Resp Temp SpO2   (!) 150/100 82 17 98.2 °F (36.8 °C) 98 %      MAP       --          Physical Exam  Nursing Notes and Vital Signs Reviewed.  Constitutional: Patient is in no acute distress. Well-developed and well-nourished.  Head: Atraumatic. Normocephalic.  Eyes: PERRL. EOM intact. Conjunctivae are not pale. No scleral icterus.  ENT: Mucous membranes are moist. Oropharynx is clear and symmetric.    Neck: Supple. Full ROM. No lymphadenopathy.  Cardiovascular: Regular rate. Regular rhythm. Systolic murmur. No rubs or gallops. Distal  "pulses are 2+ and symmetric.  Pulmonary/Chest: No respiratory distress. Clear to auscultation bilaterally. No wheezing or rales.  Abdominal: Soft and non-distended.  There is no tenderness.  No rebound, guarding, or rigidity.  Musculoskeletal: Moves all extremities. No obvious deformities.   Skin: Warm and dry.  Neurological:  Alert, awake, and appropriate.  Normal speech.  No acute focal neurological deficits are appreciated.  Psychiatric: Normal affect. Good eye contact. Appropriate in content.     ED Course   Procedures  ED Vital Signs:  Vitals:    02/03/19 1954 02/03/19 2106 02/03/19 2130 02/03/19 2200   BP: (!) 150/100  (!) 145/86 (!) 142/94   Pulse: 82 91 81 84   Resp: 17  20 19   Temp: 98.2 °F (36.8 °C)      TempSrc: Oral      SpO2: 98%  (!) 17%    Weight: 69 kg (152 lb 1.9 oz)      Height: 5' 8" (1.727 m)       02/03/19 2300 02/04/19 0000 02/04/19 0130 02/04/19 0200   BP: (!) 150/70 (!) 164/92 (!) 155/68 (!) 140/93   Pulse: 83 86 91 98   Resp: 16 17 17 (!) 23   Temp:   98 °F (36.7 °C)    TempSrc:   Oral    SpO2:       Weight:       Height:        02/04/19 0230   BP: (!) 155/92   Pulse: 85   Resp: 17   Temp: 98 °F (36.7 °C)   TempSrc: Oral   SpO2:    Weight:    Height:        Abnormal Lab Results:  Labs Reviewed   CBC W/ AUTO DIFFERENTIAL - Abnormal; Notable for the following components:       Result Value    Gran # (ANC) 8.1 (*)     All other components within normal limits   COMPREHENSIVE METABOLIC PANEL - Abnormal; Notable for the following components:    CO2 22 (*)     All other components within normal limits   INFLUENZA A & B BY MOLECULAR   CULTURE, BLOOD   CULTURE, BLOOD   CULTURE, BLOOD   LACTIC ACID, PLASMA   LACTIC ACID, PLASMA        All Lab Results:  Results for orders placed or performed during the hospital encounter of 02/03/19   Influenza A & B by Molecular   Result Value Ref Range    Influenza A, Molecular Negative Negative    Influenza B, Molecular Negative Negative    Flu A & B Source Nasal " swab    Blood Culture #1 **CANNOT BE ORDERED STAT**   Result Value Ref Range    Blood Culture, Routine No Growth to date    Blood Culture #2 **CANNOT BE ORDERED STAT**   Result Value Ref Range    Blood Culture, Routine No Growth to date    Blood culture   Result Value Ref Range    Blood Culture, Routine No Growth to date    CBC auto differential   Result Value Ref Range    WBC 11.52 3.90 - 12.70 K/uL    RBC 5.27 4.60 - 6.20 M/uL    Hemoglobin 16.2 14.0 - 18.0 g/dL    Hematocrit 46.1 40.0 - 54.0 %    MCV 88 82 - 98 fL    MCH 30.7 27.0 - 31.0 pg    MCHC 35.1 32.0 - 36.0 g/dL    RDW 13.9 11.5 - 14.5 %    Platelets 274 150 - 350 K/uL    MPV 9.8 9.2 - 12.9 fL    Gran # (ANC) 8.1 (H) 1.8 - 7.7 K/uL    Lymph # 2.4 1.0 - 4.8 K/uL    Mono # 0.9 0.3 - 1.0 K/uL    Eos # 0.1 0.0 - 0.5 K/uL    Baso # 0.02 0.00 - 0.20 K/uL    Gran% 70.1 38.0 - 73.0 %    Lymph% 21.1 18.0 - 48.0 %    Mono% 7.6 4.0 - 15.0 %    Eosinophil% 1.0 0.0 - 8.0 %    Basophil% 0.2 0.0 - 1.9 %    Differential Method Automated    Comprehensive metabolic panel   Result Value Ref Range    Sodium 140 136 - 145 mmol/L    Potassium 4.2 3.5 - 5.1 mmol/L    Chloride 106 95 - 110 mmol/L    CO2 22 (L) 23 - 29 mmol/L    Glucose 93 70 - 110 mg/dL    BUN, Bld 16 6 - 20 mg/dL    Creatinine 1.0 0.5 - 1.4 mg/dL    Calcium 9.4 8.7 - 10.5 mg/dL    Total Protein 7.2 6.0 - 8.4 g/dL    Albumin 4.2 3.5 - 5.2 g/dL    Total Bilirubin 0.4 0.1 - 1.0 mg/dL    Alkaline Phosphatase 81 55 - 135 U/L    AST 38 10 - 40 U/L    ALT 20 10 - 44 U/L    Anion Gap 12 8 - 16 mmol/L    eGFR if African American >60 >60 mL/min/1.73 m^2    eGFR if non African American >60 >60 mL/min/1.73 m^2   Lactic acid, plasma   Result Value Ref Range    Lactate (Lactic Acid) 1.0 0.5 - 2.2 mmol/L       Imaging Results:  Imaging Results          X-Ray Chest PA And Lateral (Final result)  Result time 02/03/19 21:04:41    Final result by Javier Nance MD (02/03/19 21:04:41)                 Impression:      Stable  chest x-ray.  No acute findings.      Electronically signed by: Javier Nance MD  Date:    02/03/2019  Time:    21:04             Narrative:    EXAMINATION:  XR CHEST PA AND LATERAL    CLINICAL HISTORY  Cough and congestion,    COMPARISON:  11/13/2018    FINDINGS:  Sternal wires are noted.  The cardiac size is upper limit of normal.  The lung fields appear clear.                                 The EKG was ordered, reviewed, and independently interpreted by the ED provider.  Interpretation time: 2105  Rate: 86 BPM  Rhythm: normal sinus rhythm  Interpretation: Possible anterior infarct. No STEMI.          The Emergency Provider reviewed the vital signs and test results, which are outlined above.     ED Discussion     11:37 PM: Dr. Thomas discussed the pt's case with Dr. Chow ([facility]) who states she does not feel patient needs admission and recommends consult cardiology.    11:43 PM: Dr. Thomas discussed the pt's case with Dr. Messer (Cardiology) states this is not cardiology case and has no recommendations and this is between ER physician and hospital medicine.    12:00 AM: Dr. Thomas discussed the pt's case with Valerie (House Supervisor) to discuss need for admission. Valerie states she will discuss case with Dr. Chow ([facility]).    1:00 AM: Valerie (House Supervisor) discussed patient's case with Dr. Chow ([facility]) who still recommends discharge patient. Consult placed to have hospital medicine come evaluate patient and discharge.    1:26 AM: Dr. Thomas discussed the pt's case with Dr. Chow ([facility]) who agrees to come evaluate patient and discharge him.    2:01 AM: Re-evaluated pt. Pt is resting comfortably and is in no acute distress.  D/w pt all pertinent results. D/w pt any concerns expressed at this time. Answered all questions. Pt expresses understanding at this time. Discussed with patient that hospital medicine will come evaluate him.        ED  Medication(s):  Medications   ceFEPIme in dextrose 5% 2 gram/50 mL IVPB 2 g (0 g Intravenous Stopped 2/3/19 2250)   vancomycin 1.75 g in 5 % dextrose 500 mL IVPB (0 mg/kg × 69 kg Intravenous Stopped 2/4/19 0230)        Medication List      START taking these medications    benzonatate 200 MG capsule  Commonly known as:  TESSALON  Take 1 capsule (200 mg total) by mouth 3 (three) times daily as needed for Cough.     guaiFENesin 600 mg 12 hr tablet  Commonly known as:  MUCINEX  Take 1 tablet (600 mg total) by mouth 2 (two) times daily as needed (cough and chest congestion).        ASK your doctor about these medications    albuterol 90 mcg/actuation inhaler  Commonly known as:  PROVENTIL/VENTOLIN HFA  Inhale 1-2 puffs into the lungs every 6 (six) hours as needed for Wheezing.     colchicine 0.6 mg tablet  Commonly known as:  COLCRYS  Take 1 tablet (0.6 mg total) by mouth 2 (two) times daily.     fluticasone 50 mcg/actuation nasal spray  Commonly known as:  FLONASE  1 spray (50 mcg total) by Each Nare route 2 (two) times daily as needed.     ibuprofen 800 MG tablet  Commonly known as:  ADVIL,MOTRIN  Take 1 tablet (800 mg total) by mouth every 6 (six) hours as needed for Pain.     nicotine 7 mg/24 hr  Commonly known as:  NICODERM CQ  Place 1 patch onto the skin once daily.           Where to Get Your Medications      You can get these medications from any pharmacy    Bring a paper prescription for each of these medications  · benzonatate 200 MG capsule  · guaiFENesin 600 mg 12 hr tablet           Follow-up Information     Follow-up with Primary Care Physician in 1-2 days..           [facility]    Specialty:  Emergency Medicine  Why:  As needed, If symptoms worsen, if develop fever  Contact information:  [address]  [facility] [address] [phone]                      Medical Decision Making     Medical Decision Making:   Clinical Tests:   Lab Tests: Ordered and  Reviewed  Radiological Study: Ordered and Reviewed  Medical Tests: Ordered and Reviewed             Scribe Attestation:   Scribe #1: I performed the above scribed service and the documentation accurately describes the services I performed. I attest to the accuracy of the note.     Attending:   Physician Attestation Statement for Scribe #1: I, Abdirashid Thomas MD, personally performed the services described in this documentation, as scribed by Neela Atkins, in my presence, and it is both accurate and complete.           Clinical Impression       ICD-10-CM ICD-9-CM   1. Cough R05 786.2   2. Systolic murmur R01.1 785.2   3. Bronchitis J40 490       Disposition:   Disposition: Discharged  Condition: Stable         Abdirashid Thomas MD  02/05/19 0315

## 2019-02-04 NOTE — SUBJECTIVE & OBJECTIVE
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    Hx of psychiatric care     Psychiatric problem        Past Surgical History:   Procedure Laterality Date    CARDIAC VALVE REPLACEMENT  2017    pig valve    PLACEMENT-NEOPERICARDIUM N/A 7/21/2017    Performed by [facility]itch [facility] at [facility]    REPAIR AORTIC ROOT N/A 7/21/2017    Performed by [facility] F[facility]ich [facility] at [facility] OR [facility]    REPAIR VALVE-MITRAL N/A 7/21/2017    Performed by [facility] at [facility]    REPLACEMENT-VALVE-AORTIC N/A 7/21/2017    Performed by N[facility]em Fitch MD at [facility] OR [address] [place] [address]       Review of patient's allergies indicates:  No Known Allergies    No current facility-administered medications on file prior to encounter.      Current Outpatient Medications on File Prior to Encounter   Medication Sig    albuterol (PROVENTIL/VENTOLIN HFA) 90 mcg/actuation inhaler Inhale 1-2 puffs into the lungs every 6 (six) hours as needed for Wheezing.    colchicine (COLCRYS) 0.6 mg tablet Take 1 tablet (0.6 mg total) by mouth 2 (two) times daily.    fluticasone (FLONASE) 50 mcg/actuation nasal spray 1 spray (50 mcg total) by Each Nare route 2 (two) times daily as needed.    ibuprofen (ADVIL,MOTRIN) 800 MG tablet Take 1 tablet (800 mg total) by mouth every 6 (six) hours as needed for Pain.    nicotine (NICODERM CQ) 7 mg/24 hr Place 1 patch onto the skin once daily.     Family History     None        Tobacco Use    Smoking status: Never Smoker    Smokeless tobacco: Current User     Types: Snuff, Chew   Substance and Sexual Activity    Alcohol use: Yes     Comment: occasional    Drug use: Yes     Types: Methamphetamines, MDMA (Ecstacy), Benzodiazepines    Sexual activity: Yes     Partners: Female     Review of Systems   Constitutional: Positive for fever (subjective). Negative  for activity change, appetite change, chills, diaphoresis and fatigue.   HENT: Negative for facial swelling, sore throat, tinnitus and trouble swallowing.    Eyes: Negative for photophobia and visual disturbance.   Respiratory: Positive for cough and shortness of breath. Negative for apnea, chest tightness and wheezing.    Cardiovascular: Negative for chest pain, palpitations and leg swelling.   Gastrointestinal: Negative for abdominal distention, abdominal pain, constipation, diarrhea, nausea and vomiting.   Endocrine: Negative for polydipsia, polyphagia and polyuria.   Genitourinary: Negative for decreased urine volume, dysuria, flank pain, frequency and hematuria.   Musculoskeletal: Negative for arthralgias, back pain, joint swelling, myalgias and neck stiffness.   Skin: Negative for pallor and rash.   Allergic/Immunologic: Negative for immunocompromised state.   Neurological: Negative for dizziness, seizures, syncope, weakness, numbness and headaches.   Psychiatric/Behavioral: Negative for confusion, hallucinations and suicidal ideas. The patient is not nervous/anxious.    All other systems reviewed and are negative.    Objective:     Vital Signs (Most Recent):  Temp: 98 °F (36.7 °C) (02/04/19 0130)  Pulse: 91 (02/04/19 0130)  Resp: 17 (02/04/19 0130)  BP: (!) 155/68 (02/04/19 0130)  SpO2: (!) 17 % (02/03/19 2130) Vital Signs (24h Range):  Temp:  [98 °F (36.7 °C)-98.2 °F (36.8 °C)] 98 °F (36.7 °C)  Pulse:  [81-91] 91  Resp:  [16-20] 17  SpO2:  [17 %-98 %] 17 %  BP: (142-164)/() 155/68     Weight: 69 kg (152 lb 1.9 oz)  Body mass index is 23.13 kg/m².    Physical Exam   Constitutional: He is oriented to person, place, and time. He appears well-developed and well-nourished. No distress.   HENT:   Head: Normocephalic and atraumatic.   Mouth/Throat: Oropharynx is clear and moist.   Eyes: Conjunctivae are normal. Pupils are equal, round, and reactive to light. No scleral icterus.   Neck: Normal range of motion.  Neck supple. No JVD present. No thyromegaly present.   Cardiovascular: Normal rate and regular rhythm. Exam reveals no gallop and no friction rub.   Murmur (systolic, diastolic murmur, blowing) heard.  Pulmonary/Chest: Effort normal and breath sounds normal. No respiratory distress. He has no wheezes. He has no rales.   Abdominal: Soft. Bowel sounds are normal. He exhibits no distension. There is no tenderness. There is no guarding.   Musculoskeletal: Normal range of motion.   Neurological: He is alert and oriented to person, place, and time. No cranial nerve deficit.   Skin: Skin is warm. Capillary refill takes less than 2 seconds. He is not diaphoretic. No erythema.   Psychiatric: He has a normal mood and affect.   Nursing note and vitals reviewed.        CRANIAL NERVES     CN III, IV, VI   Pupils are equal, round, and reactive to light.       Significant Labs:   CBC:   Recent Labs   Lab 02/03/19  2106   WBC 11.52   HGB 16.2   HCT 46.1        CMP:   Recent Labs   Lab 02/03/19 2106      K 4.2      CO2 22*   GLU 93   BUN 16   CREATININE 1.0   CALCIUM 9.4   PROT 7.2   ALBUMIN 4.2   BILITOT 0.4   ALKPHOS 81   AST 38   ALT 20   ANIONGAP 12   EGFRNONAA >60     Magnesium: No results for input(s): MG in the last 48 hours.  Troponin: No results for input(s): TROPONINI in the last 48 hours.  Urine Studies: No results for input(s): COLORU, APPEARANCEUA, PHUR, SPECGRAV, PROTEINUA, GLUCUA, KETONESU, BILIRUBINUA, OCCULTUA, NITRITE, UROBILINOGEN, LEUKOCYTESUR, RBCUA, WBCUA, BACTERIA, SQUAMEPITHEL, HYALINECASTS in the last 48 hours.    Invalid input(s): WRIGHTSUR  All pertinent labs within the past 24 hours have been reviewed.    Significant Imaging: I have reviewed all pertinent imaging results/findings within the past 24 hours.   Imaging Results          X-Ray Chest PA And Lateral (Final result)  Result time 02/03/19 21:04:41    Final result by Javier Nance MD (02/03/19 21:04:41)                  Impression:      Stable chest x-ray.  No acute findings.      Electronically signed by: Javier Nance MD  Date:    02/03/2019  Time:    21:04             Narrative:    EXAMINATION:  XR CHEST PA AND LATERAL    CLINICAL HISTORY  Cough and congestion,    COMPARISON:  11/13/2018    FINDINGS:  Sternal wires are noted.  The cardiac size is upper limit of normal.  The lung fields appear clear.

## 2019-02-04 NOTE — ED PROVIDER NOTES
SCRIBE #1 NOTE: I, Marty Aparicio, am scribing for, and in the presence of, Daniella Aiken NP. I have scribed the entire note.      History      Chief Complaint   Patient presents with    Cough     cough, chills, weakness, subjective fever, diarrhea x1 week       Review of patient's allergies indicates:  No Known Allergies     HPI   HPI    2/3/2019, 8:09 PM   History obtained from the patient      History of Present Illness: Kwan Palacio is a 28 y.o. male patient who presents to the Emergency Department for *** which onset gradually***. Symptoms are constant*** and moderate*** in severity. *** No mitigating or exacerbating factors reported. Associated sxs include ***. Patient denies any ***, and all other sxs at this time. Prior Tx includes ***. No further complaints or concerns at this time.         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    Hx of psychiatric care     Psychiatric problem        Past Surgical History:  Past Surgical History:   Procedure Laterality Date    CARDIAC VALVE REPLACEMENT  2017    pig valve    PLACEMENT-NEOPERICARDIUM N/A 7/21/2017    Performed by [facility] at [facility] OR [facility]    REPAIR AORTIC ROOT N/A 7/21/2017    Performed by [facility] at [facility] OR [facility]    REPAIR VALVE-MITRAL N/A 7/21/2017    Performed by [facility] at [facility] OR [facility]    REPLACEMENT-VALVE-AORTIC N/A 7/21/2017    [facility]         Family History:  No family history on file.    Social History:  Social History     Tobacco Use    Smoking status: Never Smoker    Smokeless tobacco: Current User     Types: Snuff, Chew   Substance and Sexual Activity    Alcohol use: Yes     Comment: occasional     "Drug use: Yes     Types: Methamphetamines, MDMA (Ecstacy), Benzodiazepines    Sexual activity: Yes     Partners: Female       ROS   Review of Systems  ***  Physical Exam      Initial Vitals [02/03/19 1954]   BP Pulse Resp Temp SpO2   (!) 150/100 82 17 98.2 °F (36.8 °C) 98 %      MAP       --          Physical Exam  Nursing Notes and Vital Signs Reviewed.  Constitutional: Patient is in {DISTRESS LEVEL:35474}. Well-developed and well-nourished.  Head: Atraumatic. Normocephalic.  Eyes: PERRL. EOM intact. Conjunctivae are not pale. No scleral icterus.  ENT: Mucous membranes are moist. Oropharynx is clear and symmetric***.    Neck: Supple. Full ROM. No lymphadenopathy.  Cardiovascular: Regular rate. Regular rhythm***. No murmurs, rubs, or gallops. Distal pulses are 2+ and symmetric***.  Pulmonary/Chest: No respiratory distress. Clear to auscultation bilaterally***. No wheezing or rales.  Abdominal: Soft and non-distended.  There is no tenderness.  No rebound, guarding, or rigidity. Good bowel sounds***.  Genitourinary: No*** CVA tenderness  Musculoskeletal: Moves all extremities. No obvious deformities. No edema. No calf tenderness***.  Skin: Warm and dry.  Neurological:  Alert, awake, and appropriate.  Normal speech.  No acute focal neurological deficits are appreciated.  Psychiatric: Normal affect. Good eye contact. Appropriate in content.    ED Course    Procedures  ED Vital Signs:  Vitals:    02/03/19 1954   BP: (!) 150/100   Pulse: 82   Resp: 17   Temp: 98.2 °F (36.8 °C)   TempSrc: Oral   SpO2: 98%   Weight: 69 kg (152 lb 1.9 oz)   Height: 5' 8" (1.727 m)       Abnormal Lab Results:  Labs Reviewed - No data to display     All Lab Results:  ***    Imaging Results:  Imaging Results    None                 The Emergency Provider reviewed the vital signs and test results, which are outlined above.    ED Discussion     ***    ED Medication(s):  Medications - No data to display    ***        Medical Decision Making      "         Scribe Attestation:   Scribe #1: I performed the above scribed service and the documentation accurately describes the services I performed. I attest to the accuracy of the note.    Attending:   Physician Attestation Statement for Scribe #1: I, Daniella Aiken NP, personally performed the services described in this documentation, as scribed by Marty Aparicio, in my presence, and it is both accurate and complete.          Clinical Impression     No diagnosis found.

## 2019-02-04 NOTE — CONSULTS
[facility] Hospital Medicine  Consult Note    Patient Name: Kwan Palacio  MRN: [id number]  Admission Date: 2/3/2019  Hospital Length of Stay: 0 days  Attending Physician: No att. providers found   Primary Care Provider: Primary Doctor No           Patient information was obtained from patient and ER records.     Consults  Subjective:     Principal Problem: Bronchitis    Chief Complaint:   Chief Complaint   Patient presents with    Cough     cough, chills, weakness, subjective fever, diarrhea x1 week        HPI: 28M h/o MVR and AVR, IVDU, and infective endocarditis presents with productive cough for past month.  Intermittent.  Associated with subjective fevers and sob.  He was seen by ER twice and was given steroids and albuterol and discharged home.  He was seen in urgent care earlier this week and was given amoxil and inhaler.  He reports 4 days left of abx treatment.    He was last seen by cardiology in 2017.      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    Hx of psychiatric care     Psychiatric problem        Past Surgical History:   Procedure Laterality Date    CARDIAC VALVE REPLACEMENT  2017    pig valve    PLACEMENT-NEOPERICARDIUM N/A 7/21/2017    Performed [facility] at [facility]    REPAIR AORTIC ROOT N/A 7/21/2017    Performed by [facility] at [facility] OR [facility]    REPAIR VALVE-MITRAL N/A 7/21/2017    Performed by [facility] at [facility] OR [facility]    REPLACEMENT-VALVE-AORTIC N/A 7/21/2017    [facility]       Review of patient's allergies indicates:  No Known Allergies    No current facility-administered medications on file prior to encounter.      Current Outpatient Medications on File Prior to Encounter   Medication Sig    albuterol (PROVENTIL/VENTOLIN  HFA) 90 mcg/actuation inhaler Inhale 1-2 puffs into the lungs every 6 (six) hours as needed for Wheezing.    colchicine (COLCRYS) 0.6 mg tablet Take 1 tablet (0.6 mg total) by mouth 2 (two) times daily.    fluticasone (FLONASE) 50 mcg/actuation nasal spray 1 spray (50 mcg total) by Each Nare route 2 (two) times daily as needed.    ibuprofen (ADVIL,MOTRIN) 800 MG tablet Take 1 tablet (800 mg total) by mouth every 6 (six) hours as needed for Pain.    nicotine (NICODERM CQ) 7 mg/24 hr Place 1 patch onto the skin once daily.     Family History     None        Tobacco Use    Smoking status: Never Smoker    Smokeless tobacco: Current User     Types: Snuff, Chew   Substance and Sexual Activity    Alcohol use: Yes     Comment: occasional    Drug use: Yes     Types: Methamphetamines, MDMA (Ecstacy), Benzodiazepines    Sexual activity: Yes     Partners: Female     Review of Systems   Constitutional: Positive for fever (subjective). Negative for activity change, appetite change, chills, diaphoresis and fatigue.   HENT: Negative for facial swelling, sore throat, tinnitus and trouble swallowing.    Eyes: Negative for photophobia and visual disturbance.   Respiratory: Positive for cough and shortness of breath. Negative for apnea, chest tightness and wheezing.    Cardiovascular: Negative for chest pain, palpitations and leg swelling.   Gastrointestinal: Negative for abdominal distention, abdominal pain, constipation, diarrhea, nausea and vomiting.   Endocrine: Negative for polydipsia, polyphagia and polyuria.   Genitourinary: Negative for decreased urine volume, dysuria, flank pain, frequency and hematuria.   Musculoskeletal: Negative for arthralgias, back pain, joint swelling, myalgias and neck stiffness.   Skin: Negative for pallor and rash.   Allergic/Immunologic: Negative for immunocompromised state.   Neurological: Negative for dizziness, seizures, syncope, weakness, numbness and headaches.   Psychiatric/Behavioral:  Negative for confusion, hallucinations and suicidal ideas. The patient is not nervous/anxious.    All other systems reviewed and are negative.    Objective:     Vital Signs (Most Recent):  Temp: 98 °F (36.7 °C) (02/04/19 0130)  Pulse: 91 (02/04/19 0130)  Resp: 17 (02/04/19 0130)  BP: (!) 155/68 (02/04/19 0130)  SpO2: (!) 17 % (02/03/19 2130) Vital Signs (24h Range):  Temp:  [98 °F (36.7 °C)-98.2 °F (36.8 °C)] 98 °F (36.7 °C)  Pulse:  [81-91] 91  Resp:  [16-20] 17  SpO2:  [17 %-98 %] 17 %  BP: (142-164)/() 155/68     Weight: 69 kg (152 lb 1.9 oz)  Body mass index is 23.13 kg/m².    Physical Exam   Constitutional: He is oriented to person, place, and time. He appears well-developed and well-nourished. No distress.   HENT:   Head: Normocephalic and atraumatic.   Mouth/Throat: Oropharynx is clear and moist.   Eyes: Conjunctivae are normal. Pupils are equal, round, and reactive to light. No scleral icterus.   Neck: Normal range of motion. Neck supple. No JVD present. No thyromegaly present.   Cardiovascular: Normal rate and regular rhythm. Exam reveals no gallop and no friction rub.   Murmur (systolic, diastolic murmur, blowing) heard.  Pulmonary/Chest: Effort normal and breath sounds normal. No respiratory distress. He has no wheezes. He has no rales.   Abdominal: Soft. Bowel sounds are normal. He exhibits no distension. There is no tenderness. There is no guarding.   Musculoskeletal: Normal range of motion.   Neurological: He is alert and oriented to person, place, and time. No cranial nerve deficit.   Skin: Skin is warm. Capillary refill takes less than 2 seconds. He is not diaphoretic. No erythema.   Psychiatric: He has a normal mood and affect.   Nursing note and vitals reviewed.        CRANIAL NERVES     CN III, IV, VI   Pupils are equal, round, and reactive to light.       Significant Labs:   CBC:   Recent Labs   Lab 02/03/19  2106   WBC 11.52   HGB 16.2   HCT 46.1        CMP:   Recent Labs   Lab  02/03/19  2106      K 4.2      CO2 22*   GLU 93   BUN 16   CREATININE 1.0   CALCIUM 9.4   PROT 7.2   ALBUMIN 4.2   BILITOT 0.4   ALKPHOS 81   AST 38   ALT 20   ANIONGAP 12   EGFRNONAA >60     Magnesium: No results for input(s): MG in the last 48 hours.  Troponin: No results for input(s): TROPONINI in the last 48 hours.  Urine Studies: No results for input(s): COLORU, APPEARANCEUA, PHUR, SPECGRAV, PROTEINUA, GLUCUA, KETONESU, BILIRUBINUA, OCCULTUA, NITRITE, UROBILINOGEN, LEUKOCYTESUR, RBCUA, WBCUA, BACTERIA, SQUAMEPITHEL, HYALINECASTS in the last 48 hours.    Invalid input(s): WRIGHTSUR  All pertinent labs within the past 24 hours have been reviewed.    Significant Imaging: I have reviewed all pertinent imaging results/findings within the past 24 hours.   Imaging Results          X-Ray Chest PA And Lateral (Final result)  Result time 02/03/19 21:04:41    Final result by Javier Nance MD (02/03/19 21:04:41)                 Impression:      Stable chest x-ray.  No acute findings.      Electronically signed by: Javier Nance MD  Date:    02/03/2019  Time:    21:04             Narrative:    EXAMINATION:  XR CHEST PA AND LATERAL    CLINICAL HISTORY  Cough and congestion,    COMPARISON:  11/13/2018    FINDINGS:  Sternal wires are noted.  The cardiac size is upper limit of normal.  The lung fields appear clear.                                  Assessment/Plan:     * Bronchitis    - continue full course of amoxil  - continue inhaler use, will prescribe spacer and education for use per RT  - start tessalon pearls TID prn cough, and mucinex 600mg q12h  - if sx worsen, return to ER or go to PCP for further follow up of symptoms  - follow up with cardiology annually for check up, patient reports understanding           Murmur, heart    - systolic and diastolic, from h/o AVR and MVR  - no leukocytosis, no systemic signs of infection to suggest endocarditis  - blood cultures collected in ER, if cx positive  will follow up with patient after discharge  - follow up with outpatient cardiology          VTE Risk Mitigation (From admission, onward)    None              Thank you for your consult. I will sign off. Please contact us if you have any additional questions.    Jonathon Chow MD  Department of Hospital Medicine   [facility]

## 2019-02-04 NOTE — HPI
28M h/o MVR and AVR, IVDU, and infective endocarditis presents with productive cough for past month.  Intermittent.  Associated with subjective fevers and sob.  He was seen by ER twice and was given steroids and albuterol and discharged home.  He was seen in urgent care earlier this week and was given amoxil and inhaler.  He reports 4 days left of abx treatment.    He was last seen by cardiology in 2017.

## 2019-02-09 LAB
BACTERIA BLD CULT: NORMAL

## 2019-02-27 ENCOUNTER — HOSPITAL ENCOUNTER (EMERGENCY)
Facility: HOSPITAL | Age: 29
Discharge: HOME | End: 2019-02-27
Attending: EMERGENCY MEDICINE
Payer: MEDICAID

## 2019-02-27 VITALS
HEART RATE: 102 BPM | TEMPERATURE: 101 F | SYSTOLIC BLOOD PRESSURE: 149 MMHG | RESPIRATION RATE: 16 BRPM | WEIGHT: 160 LBS | OXYGEN SATURATION: 96 % | BODY MASS INDEX: 24.33 KG/M2 | DIASTOLIC BLOOD PRESSURE: 69 MMHG

## 2019-02-27 DIAGNOSIS — R19.7 VOMITING AND DIARRHEA: Primary | ICD-10-CM

## 2019-02-27 DIAGNOSIS — R11.10 VOMITING AND DIARRHEA: Primary | ICD-10-CM

## 2019-02-27 LAB
ANION GAP SERPL CALC-SCNC: 12 MMOL/L
BASOPHILS # BLD AUTO: 0 K/UL
BASOPHILS NFR BLD: 0.1 %
BUN SERPL-MCNC: 13 MG/DL
CALCIUM SERPL-MCNC: 9.5 MG/DL
CHLORIDE SERPL-SCNC: 105 MMOL/L
CO2 SERPL-SCNC: 21 MMOL/L
CREAT SERPL-MCNC: 1 MG/DL
DIFFERENTIAL METHOD: ABNORMAL
EOSINOPHIL # BLD AUTO: 0 K/UL
EOSINOPHIL NFR BLD: 0.1 %
ERYTHROCYTE [DISTWIDTH] IN BLOOD BY AUTOMATED COUNT: 13.4 %
EST. GFR  (AFRICAN AMERICAN): >60 ML/MIN/1.73 M^2
EST. GFR  (NON AFRICAN AMERICAN): >60 ML/MIN/1.73 M^2
GLUCOSE SERPL-MCNC: 98 MG/DL
HCT VFR BLD AUTO: 49.8 %
HGB BLD-MCNC: 16.6 G/DL
LACTATE SERPL-SCNC: 1.3 MMOL/L
LYMPHOCYTES # BLD AUTO: 0.9 K/UL
LYMPHOCYTES NFR BLD: 5.6 %
MCH RBC QN AUTO: 29 PG
MCHC RBC AUTO-ENTMCNC: 33.3 G/DL
MCV RBC AUTO: 87 FL
MONOCYTES # BLD AUTO: 0.7 K/UL
MONOCYTES NFR BLD: 4.7 %
NEUTROPHILS # BLD AUTO: 14.2 K/UL
NEUTROPHILS NFR BLD: 89.5 %
PLATELET # BLD AUTO: 265 K/UL
PMV BLD AUTO: 7.7 FL
POTASSIUM SERPL-SCNC: 4 MMOL/L
RBC # BLD AUTO: 5.72 M/UL
SODIUM SERPL-SCNC: 138 MMOL/L
WBC # BLD AUTO: 15.8 K/UL

## 2019-02-27 PROCEDURE — 80048 BASIC METABOLIC PNL TOTAL CA: CPT

## 2019-02-27 PROCEDURE — 25000003 PHARM REV CODE 250: Performed by: EMERGENCY MEDICINE

## 2019-02-27 PROCEDURE — 85025 COMPLETE CBC W/AUTO DIFF WBC: CPT

## 2019-02-27 PROCEDURE — 36415 COLL VENOUS BLD VENIPUNCTURE: CPT

## 2019-02-27 PROCEDURE — 83605 ASSAY OF LACTIC ACID: CPT

## 2019-02-27 PROCEDURE — 99283 EMERGENCY DEPT VISIT LOW MDM: CPT

## 2019-02-27 RX ORDER — ACETAMINOPHEN 325 MG/1
650 TABLET ORAL
Status: COMPLETED | OUTPATIENT
Start: 2019-02-27 | End: 2019-02-27

## 2019-02-27 RX ORDER — ONDANSETRON 4 MG/1
4 TABLET, ORALLY DISINTEGRATING ORAL EVERY 8 HOURS PRN
Qty: 12 TABLET | Refills: 0 | Status: SHIPPED | OUTPATIENT
Start: 2019-02-27

## 2019-02-27 RX ORDER — IBUPROFEN 600 MG/1
600 TABLET ORAL
Status: COMPLETED | OUTPATIENT
Start: 2019-02-27 | End: 2019-02-27

## 2019-02-27 RX ORDER — AMOXICILLIN AND CLAVULANATE POTASSIUM 875; 125 MG/1; MG/1
1 TABLET, FILM COATED ORAL
COMMUNITY
Start: 2019-02-27 | End: 2019-03-23

## 2019-02-27 RX ORDER — ONDANSETRON 2 MG/ML
4 INJECTION INTRAMUSCULAR; INTRAVENOUS
Status: DISCONTINUED | OUTPATIENT
Start: 2019-02-27 | End: 2019-02-27 | Stop reason: HOSPADM

## 2019-02-27 RX ADMIN — IBUPROFEN 600 MG: 600 TABLET, FILM COATED ORAL at 03:02

## 2019-02-27 RX ADMIN — ACETAMINOPHEN 650 MG: 325 TABLET ORAL at 03:02

## 2019-02-27 NOTE — ED PROVIDER NOTES
Encounter Date: 2/27/2019    SCRIBE #1 NOTE: IShaina, am scribing for, and in the presence of, Dr. Sandoval.       History     Chief Complaint   Patient presents with    Fever    Vomiting    Diarrhea       Time seen by provider: 2:46 PM on 02/27/2019    Kwan Palacio is a 28 y.o. male with a hx of IV drug use who presents to the ED complaining of fever, vomiting, diarrhea, and weakness starting this morning. Pt reports 3 episodes of vomiting and 2 episodes of diarrhea today. He notes that he was diagnosed with bronchitis earlier this month and prescribed doxycycline; his symptoms had since resolved. Pt states that his girlfriend's daughter had the flu last week. The patient denies rashes, chest pain, abdominal pain, burning with urination, bloody urine or stool, or any other symptoms at this time. 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 No known drug allergies noted.        The history is provided by the patient.     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    Hx of psychiatric care     Psychiatric problem      Past Surgical History:   Procedure Laterality Date    CARDIAC VALVE REPLACEMENT  2017    pig valve    PLACEMENT-NEOPERICARDIUM N/A 7/21/2017    Performed by Naeem [facility]itch [facility] at [facility] OR 2ND FLR    REPAIR AORTIC ROOT N/A 7/21/2017    Performed by Naeem [facility]itch MD at [facility] OR [facility]    REPAIR VALVE-MITRAL N/A 7/21/2017    Performed by Naeem [facility]itch MD at [facility] OR [facility]    REPLACEMENT-VALVE-AORTIC N/A 7/21/2017    Performed by Naeem Fitch MD at [facility] OR [facility]     History reviewed. No pertinent family history.  Social History     Tobacco Use    Smoking status: Never Smoker    Smokeless tobacco: Current  User     Types: Snuff, Chew   Substance Use Topics    Alcohol use: Yes     Comment: occasional    Drug use: Yes     Types: Methamphetamines, MDMA (Ecstacy), Benzodiazepines     Review of Systems   Constitutional: Positive for fever.   HENT: Negative for sore throat.    Respiratory: Negative for shortness of breath.    Cardiovascular: Negative for chest pain.   Gastrointestinal: Positive for diarrhea and vomiting. Negative for abdominal pain and blood in stool.   Genitourinary: Negative for dysuria and hematuria.   Musculoskeletal: Negative for back pain.   Skin: Negative for rash.   Neurological: Positive for weakness (generalized).   Hematological: Does not bruise/bleed easily.       Physical Exam     Initial Vitals [02/27/19 1435]   BP Pulse Resp Temp SpO2   (!) 162/110 (!) 124 16 99.8 °F (37.7 °C) 98 %      MAP       --         Physical Exam    Nursing note and vitals reviewed.  Constitutional: He appears well-developed and well-nourished. He is not diaphoretic. No distress.   HENT:   Head: Normocephalic and atraumatic.   Mouth/Throat: Oropharynx is clear and moist.   Eyes: Conjunctivae are normal.   Neck: Neck supple.   Cardiovascular: Regular rhythm and intact distal pulses. Tachycardia present.  Exam reveals no gallop and no friction rub.    Murmur heard.   Systolic murmur is present with a grade of 3/6.  3/6 systolic murmur best heard over mitral area.   Pulmonary/Chest: Breath sounds normal. He has no wheezes. He has no rhonchi. He has no rales.   Abdominal: Soft. He exhibits no distension. There is no tenderness.   Musculoskeletal: Normal range of motion.   Neurological: He is alert and oriented to person, place, and time.   Skin: No rash noted. No erythema.   Brisk capillary refill.         ED Course   Procedures  Labs Reviewed   CBC W/ AUTO DIFFERENTIAL - Abnormal; Notable for the following components:       Result Value    WBC 15.80 (*)     MPV 7.7 (*)     Gran # (ANC) 14.2 (*)     Lymph # 0.9 (*)      Gran% 89.5 (*)     Lymph% 5.6 (*)     All other components within normal limits   BASIC METABOLIC PANEL - Abnormal; Notable for the following components:    CO2 21 (*)     All other components within normal limits   LACTIC ACID, PLASMA          Imaging Results    None          Medical Decision Making:   History:   Old Medical Records: I decided to obtain old medical records.  Clinical Tests:   Lab Tests: Ordered and Reviewed            Scribe Attestation:   Scribe #1: I performed the above scribed service and the documentation accurately describes the services I performed. I attest to the accuracy of the note.    I, Dr. Tapan Sandoval, personally performed the services described in this documentation. All medical record entries made by the scribe were at my direction and in my presence.  I have reviewed the chart and agree that the record reflects my personal performance and is accurate and complete. Tapan Sandoval MD.  10:15 PM 02/27/2019    Kwan Palacio is a 28 y.o. male presenting with fever with onset of vomiting and diarrhea.  Patient is well-appearing.  Symptoms are not consistent with recurrent endocarditis.  I did discuss this possibility although very unlikely with the patient.  I did discuss close follow-up to ensure resolution and to reassess.  Tachycardia notably resolves even without IV fluids only with antipyretics given here.  I doubt sepsis.  I do not think antibiotics are indicated at this point.  If infectious much more likely viral.  Leukocytosis noted is nonspecific.  He does not appear significantly dehydrated.  He has a benign abdominal exam and I have very low suspicion for emergent, life-threatening intra-abdominal process such as appendicitis.  Zofran as needed for nausea prescribed with patient tolerating fluids here.  Detailed return precautions reviewed.           Clinical Impression:       ICD-10-CM ICD-9-CM   1. Vomiting and diarrhea R11.10 787.03    R19.7 787.91          Disposition:   Disposition: Discharged  Condition: Stable                        Tapan Sandoval MD  02/27/19 4914

## 2019-02-27 NOTE — ED NOTES
Pt presents to ED POV from home with c/o fever, nausea, vomiting, and diarrhea. Pt is AAOx4. Skin warm, dry to touch. Respirations even, nonlabored. NAD noted. Pt is calm, cooperative. Abdomen is soft, nontender. No active vomiting or diarrhea noted at this time.

## 2019-10-27 ENCOUNTER — HOSPITAL ENCOUNTER (EMERGENCY)
Facility: HOSPITAL | Age: 29
Discharge: HOME | End: 2019-10-28
Attending: EMERGENCY MEDICINE
Payer: MEDICAID

## 2019-10-27 VITALS
HEART RATE: 119 BPM | BODY MASS INDEX: 24.33 KG/M2 | SYSTOLIC BLOOD PRESSURE: 151 MMHG | DIASTOLIC BLOOD PRESSURE: 76 MMHG | TEMPERATURE: 99 F | WEIGHT: 160 LBS | RESPIRATION RATE: 16 BRPM | OXYGEN SATURATION: 96 %

## 2019-10-27 DIAGNOSIS — S21.91XA: Primary | ICD-10-CM

## 2019-10-27 DIAGNOSIS — S41.112A LACERATION OF LEFT AXILLA, INITIAL ENCOUNTER: ICD-10-CM

## 2019-10-27 PROCEDURE — 12004 RPR S/N/AX/GEN/TRK7.6-12.5CM: CPT

## 2019-10-27 PROCEDURE — 99283 EMERGENCY DEPT VISIT LOW MDM: CPT | Mod: 25

## 2019-10-27 RX ORDER — MUPIROCIN 20 MG/G
2 OINTMENT TOPICAL ONCE
Status: COMPLETED | OUTPATIENT
Start: 2019-10-28 | End: 2019-10-28

## 2019-10-27 RX ORDER — LIDOCAINE HYDROCHLORIDE AND EPINEPHRINE 10; 10 MG/ML; UG/ML
20 INJECTION, SOLUTION INFILTRATION; PERINEURAL ONCE
Status: COMPLETED | OUTPATIENT
Start: 2019-10-28 | End: 2019-10-28

## 2019-10-28 PROCEDURE — 25000003 PHARM REV CODE 250: Performed by: PHYSICIAN ASSISTANT

## 2019-10-28 RX ORDER — MUPIROCIN 20 MG/G
OINTMENT TOPICAL 3 TIMES DAILY
Qty: 30 G | Refills: 0 | Status: SHIPPED | OUTPATIENT
Start: 2019-10-28

## 2019-10-28 RX ORDER — CEPHALEXIN 500 MG/1
500 CAPSULE ORAL EVERY 6 HOURS
Qty: 40 CAPSULE | Refills: 0 | Status: SHIPPED | OUTPATIENT
Start: 2019-10-28 | End: 2019-11-07

## 2019-10-28 RX ADMIN — LIDOCAINE HYDROCHLORIDE AND EPINEPHRINE 20 ML: 10; 10 INJECTION, SOLUTION INFILTRATION; PERINEURAL at 01:10

## 2019-10-28 RX ADMIN — MUPIROCIN 2 G: 20 OINTMENT TOPICAL at 01:10

## 2019-10-28 NOTE — ED NOTES
Adult Physical Assessment  LOC: Kwan Palacio, 29 y.o. male verified via two identifiers.  The patient is awake, alert, oriented.  APPEARANCE:pt appears drowsy, noted to have slurred speech.  SKIN:The skin is warm and dry, color consistent with ethnicity, patient has normal skin turgor and moist mucus membranes. Laceration noted to the R flank area.   MUSCULOSKELETAL: Patient moving all extremities well, no obvious swelling or deformities noted.  RESPIRATORY: Airway is open and patent, respirations are spontaneous, patient has a normal effort and rate, no accessory muscle use noted.  CARDIAC: Patient has a normal rate and rhythm, no periphreal edema noted in any extremity, capillary refill < 3 seconds in all extremities  ABDOMEN: Soft and non tender to palpation, no abdominal distention noted. Bowel sounds present in all four quadrants.
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
## 2019-10-28 NOTE — ED NOTES
Discharge instructions, diagnosis, medications, and follow up discussed with patient. Patient verbalized understanding. All questions and concerns answered. No needs expressed at the time. Pt is awake, alert and oriented with no acute distress noted. Respirations even and unlabored. Ambulatory out of ED.

## 2019-11-16 ENCOUNTER — HOSPITAL ENCOUNTER (EMERGENCY)
Facility: HOSPITAL | Age: 29
Discharge: HOME | End: 2019-11-16
Attending: SURGERY
Payer: MEDICAID

## 2019-11-16 VITALS
OXYGEN SATURATION: 98 % | WEIGHT: 144.38 LBS | RESPIRATION RATE: 16 BRPM | SYSTOLIC BLOOD PRESSURE: 156 MMHG | TEMPERATURE: 98 F | BODY MASS INDEX: 21.96 KG/M2 | HEART RATE: 76 BPM | DIASTOLIC BLOOD PRESSURE: 98 MMHG

## 2019-11-16 DIAGNOSIS — J00 ACUTE NASOPHARYNGITIS: Primary | ICD-10-CM

## 2019-11-16 PROCEDURE — 96372 THER/PROPH/DIAG INJ SC/IM: CPT

## 2019-11-16 PROCEDURE — 99284 EMERGENCY DEPT VISIT MOD MDM: CPT | Mod: 25

## 2019-11-16 PROCEDURE — 63600175 PHARM REV CODE 636 W HCPCS: Performed by: SURGERY

## 2019-11-16 RX ORDER — METHYLPREDNISOLONE SOD SUCC 125 MG
125 VIAL (EA) INJECTION
Status: COMPLETED | OUTPATIENT
Start: 2019-11-16 | End: 2019-11-16

## 2019-11-16 RX ORDER — PROMETHAZINE HYDROCHLORIDE AND DEXTROMETHORPHAN HYDROBROMIDE 6.25; 15 MG/5ML; MG/5ML
5 SYRUP ORAL EVERY 6 HOURS PRN
Qty: 118 ML | Refills: 0 | Status: SHIPPED | OUTPATIENT
Start: 2019-11-16 | End: 2019-11-26

## 2019-11-16 RX ORDER — LEVOFLOXACIN 500 MG/1
500 TABLET, FILM COATED ORAL DAILY
Qty: 7 TABLET | Refills: 0 | Status: SHIPPED | OUTPATIENT
Start: 2019-11-16 | End: 2019-11-23

## 2019-11-16 RX ORDER — METHYLPREDNISOLONE 4 MG/1
TABLET ORAL
Qty: 1 PACKAGE | Refills: 0 | Status: SHIPPED | OUTPATIENT
Start: 2019-11-16

## 2019-11-16 RX ADMIN — METHYLPREDNISOLONE SODIUM SUCCINATE 125 MG: 125 INJECTION, POWDER, FOR SOLUTION INTRAMUSCULAR; INTRAVENOUS at 05:11

## 2019-11-16 NOTE — ED PROVIDER NOTES
[facility]                                                 Chief Complaint  29 y.o. male with URI    History of Present Illness  Kwan Palacio presents to the emergency room with nasal congestion  Patient has nasal congestion and clear nasal drainage, clear lung sounds  Pt on exam has no wheezing or sputum, no shortness of breath reported  The patient with no nausea vomiting or diarrhea diet, no flu-like symptoms    The history is provided by the patient   device was not used during this ER visit  Medical history: ADHD, depression, endocarditis, shoulder dislocation  Surgeries: Cardiac valve replacement  No known allergies    I have reviewed all of this patient's past medical, surgical, family, and social   histories as well as active allergies and medications documented in the  electronic medical record    Review of Systems and Physical Exam      Review of Systems  -- Constitution - no fever, denies fatigue, no weakness, no chills  -- Eyes - no tearing or redness, no visual disturbance  -- Ear, Nose - sneezing, nasal congestion and clear discharge   -- Mouth,Throat - no sore throat, no toothache, normal voice, normal swallowing  -- Respiratory - cough and congestion, no shortness of breath, no ROBERTS  -- Cardiovascular - denies chest pain, no palpitations, denies claudication  -- Gastrointestinal - denies abdominal pain, nausea, vomiting, or diarrhea  -- Genitourinary - no dysuria, no hematuria, no flank pain, no bladder pain  -- Musculoskeletal - denies back pain, negative for trauma or injury  -- Neurological - no headache, denies weakness or seizure; no LOC  -- Skin - denies pallor, rash, or changes in skin. no hives or welts noted    Vital Signs  His tympanic temperature is 97.4 °F (36.3 °C).   His blood pressure is 156/96 and his pulse is 109.   His respiration is 20 and oxygen saturation is 98%.     Physical Exam  -- Nursing note and vitals reviewed  --  Constitutional: Appears well-developed and well-nourished  -- Head: Atraumatic. Normocephalic. No obvious abnormality  -- Eyes: Pupils are equal and reactive to light. Normal conjunctiva and lids  -- Nose: nasal mucosa erythema and edema; clear nasal discharge noted   -- Throat: Mucous membranes moist, pharynx normal, normal tonsils. No lesions   -- Ears: External ears and TM normal bilaterally. Normal hearing and no drainage  -- Neck: Normal range of motion. Neck supple. No masses, trachea midline  -- Cardiac: Normal rate, regular rhythm and normal heart sounds  -- Pulmonary: Normal respiratory effort, breath sounds clear to auscultation  -- Abdominal: Soft, no tenderness. Normal bowel sounds. Normal liver edge  -- Musculoskeletal: Normal range of motion, no effusions. Joints stable   -- Neurological: No focal deficits. Showed good interaction with staff  -- Skin: Warm and dry. No evidence of rash or cellulitis    Emergency Room Course      Treatment and Evaluation  --  mg Solumedrol given today in the ER    Diagnosis  -- The encounter diagnosis was Acute nasopharyngitis.    Disposition and Plan  -- Disposition: home  -- Condition: stable  -- Follow-up: Patient to follow up with MD in 1-2 days.  -- I advised the patient that we have found no life threatening condition today  -- At this time, I believe the patient is clinically stable for discharge.   -- The patient acknowledges that close follow up with a MD is required   -- Patient agrees to comply with all instruction and direction given in the ER    This note is dictated on M*Modal word recognition program.  There are word recognition mistakes that are occasionally missed on review.          Zac Montano MD  11/16/19 6523

## 2019-11-16 NOTE — ED TRIAGE NOTES
29 y.o. male presents to ER ED 01/ED 01B   Chief Complaint   Patient presents with    URI   Pt reports cough, congestion and sore throat since this morning. No acute distress noted.

## 2021-02-06 NOTE — SUBJECTIVE & OBJECTIVE
Past Medical History:   Diagnosis Date    Dislocation of shoulder, left, closed        History reviewed. No pertinent surgical history.    Review of patient's allergies indicates:  No Known Allergies    Family History     None        Social History Main Topics    Smoking status: Never Smoker    Smokeless tobacco: Current User     Types: Snuff    Alcohol use Yes      Comment: occas    Drug use:      Types: Methamphetamines, MDMA (Ecstacy), Benzodiazepines    Sexual activity: Yes     Partners: Female         Review of Systems   Constitutional: Negative for activity change, fever and unexpected weight change.   HENT: Negative for hearing loss and tinnitus.    Respiratory: Positive for shortness of breath. Negative for choking and wheezing.    Cardiovascular: Positive for chest pain. Negative for palpitations and leg swelling.   Gastrointestinal: Negative for abdominal pain and blood in stool.   Endocrine: Negative.    Genitourinary: Negative.    Neurological: Negative.      Objective:     Vital Signs (Most Recent):  Temp: 98.5 °F (36.9 °C) (07/20/17 1500)  Pulse: 110 (07/20/17 1800)  Resp: (!) 33 (07/20/17 1800)  BP: 115/61 (07/20/17 1800)  SpO2: 97 % (07/20/17 1800) Vital Signs (24h Range):  Temp:  [98.5 °F (36.9 °C)-99.1 °F (37.3 °C)] 98.5 °F (36.9 °C)  Pulse:  [] 110  Resp:  [14-40] 33  SpO2:  [91 %-100 %] 97 %  BP: ()/(51-70) 115/61     Weight: 68.3 kg (150 lb 9.2 oz)  Body mass index is 22.89 kg/m².      Intake/Output Summary (Last 24 hours) at 07/20/17 1820  Last data filed at 07/20/17 1600   Gross per 24 hour   Intake              100 ml   Output              985 ml   Net             -885 ml       Physical Exam    Gen: Awake, conversant on nasal cannula  HEENT: EOMI  CV: Tachycardic, loud systolic and diastolic murmurs  Pulm: Crackles bilaterally though breath sounds somewhat diminished  Abd: Soft  Ext: No significant edema    Vents:       Lines/Drains/Airways     Peripheral Intravenous Line                  Peripheral IV - Single Lumen 07/19/17 0020 Left Forearm 1 day         Peripheral IV - Single Lumen 07/20/17 0130 Left Antecubital less than 1 day                Significant Labs:    CBC/Anemia Profile:    Recent Labs  Lab 07/20/17  0100   WBC 12.47  12.47   HGB 11.0*  11.0*   HCT 33.4*  33.4*     293   MCV 85  85   RDW 17.2*  17.2*        Chemistries:    Recent Labs  Lab 07/20/17  0100     140  140   K 3.9  3.9  3.9     100  100   CO2 29  29  29   BUN 16  16  16   CREATININE 1.1  1.1  1.1   CALCIUM 8.6*  8.6*  8.6*   ALBUMIN 2.9*  2.9*   PROT 6.5  6.5   BILITOT 0.5  0.5   ALKPHOS 84  84   ALT 35  35   AST 32  32   MG 1.8  1.8   PHOS 4.7*  4.7*       ABGs: No results for input(s): PH, PCO2, HCO3, POCSATURATED, BE in the last 48 hours.  CMP:   Recent Labs  Lab 07/20/17  0100     140  140   K 3.9  3.9  3.9     100  100   CO2 29  29  29     104  104   BUN 16  16  16   CREATININE 1.1  1.1  1.1   CALCIUM 8.6*  8.6*  8.6*   PROT 6.5  6.5   ALBUMIN 2.9*  2.9*   BILITOT 0.5  0.5   ALKPHOS 84  84   AST 32  32   ALT 35  35   ANIONGAP 11  11  11   EGFRNONAA >60.0  >60.0  >60.0       Significant Imaging:   I have reviewed all pertinent imaging results/findings within the past 24 hours.   97

## 2022-11-10 NOTE — PT/OT/SLP DISCHARGE
Physical Therapy Discharge Summary    Kwan Palacio  MRN: [id number]   S/P AVR (aortic valve replacement)   Patient Discharged from acute Physical Therapy on 17.  Please refer to prior PT noted date on 17 for functional status.     Assessment:   Patient has met all goals and is not appropriate for therapy.  GOALS:    Physical Therapy Goals     Not on file          Multidisciplinary Problems (Resolved)        Problem: Physical Therapy Goal    Goal Priority Disciplines Outcome Goal Variances Interventions   Physical Therapy Goal   (Resolved)     PT/OT, PT Outcome(s) achieved     Description:  Goals to be met by:      Patient will increase functional independence with mobility by performin. Supine to sit with Stand-by Assistance --MET  2. Sit to supine with Stand-by Assistance-- MET  3. Sit to stand transfer with Stand-by Assistance-- MET  4. Gait  x 100 feet with Stand-by Assistance -- MET                  Reasons for Discontinuation of Therapy Services  Satisfactory goal achievement.      Plan:  PT orders discontinued.     Mayte Bae PT, DPT   2017  Pager: [phone]    
Type and Screen/POCT Blood Glucose

## (undated) DEVICE — ELECTRODE REM PLYHSV RETURN 9

## (undated) DEVICE — TRAY CATH UM FOLEY SIL W 16FR

## (undated) DEVICE — FOGERTY SOFT JAW DISP 2/PK

## (undated) DEVICE — DRAIN INTRACARDIAC SUMP 1/8IN

## (undated) DEVICE — SPONGE GAUZE 16PLY 4X4

## (undated) DEVICE — SUT SILK BLK BR. 2 2-60

## (undated) DEVICE — APPLICATOR CHLORAPREP ORN 26ML

## (undated) DEVICE — COVER SET UP STRL 54X54 20/BX

## (undated) DEVICE — DRESSING ADH ISLAND 3.6 X 14

## (undated) DEVICE — CONTAINER SPECIMEN STRL 4OZ

## (undated) DEVICE — SUT VICRYL BR 1 GEN 27 CT-1

## (undated) DEVICE — SOL 9P NACL IRR PIC IL

## (undated) DEVICE — INSERTS STEALTH FIBRA SIZE 5

## (undated) DEVICE — CANNULA AORTIC ROOT 12 GAUGE 9

## (undated) DEVICE — NDL BOX COUNTER

## (undated) DEVICE — DRAPE SPLIT STERILE

## (undated) DEVICE — BLADE STERN 65.8MM

## (undated) DEVICE — SEE MEDLINE ITEM 146417

## (undated) DEVICE — DRAIN CHEST DRY SUCTION

## (undated) DEVICE — CLOSURE SKIN STERI STRIP 1/2X4

## (undated) DEVICE — SUT 3-0 CTD VICRYL 27IN PS

## (undated) DEVICE — PROBE CATH TEMP 16 FRFOLEY 400

## (undated) DEVICE — DRAIN CHANNEL ROUND 19FR

## (undated) DEVICE — DRESSING AQUACEL SACRAL 9 X 9

## (undated) DEVICE — DRAPE STERI INSTRUMENT 1018

## (undated) DEVICE — SUT 2-0 VICRYL / CT-1

## (undated) DEVICE — GAUZE SPONGE 4X4 12PLY

## (undated) DEVICE — SUT ETHIBOND EXCEL 2-0 SH-2

## (undated) DEVICE — SUT PROLENE 4-0 RB-1 BL MO

## (undated) DEVICE — KIT SAHARA DRAPE DRAW/LIFT

## (undated) DEVICE — SUT VICRYL PLUS 3-0 FS1 27

## (undated) DEVICE — SUT PROLENE 5-0 BL C-1 4-24

## (undated) DEVICE — SOL NS 1000CC

## (undated) DEVICE — ADHESIVE DERMABOND ADVANCED

## (undated) DEVICE — ELECTRODE PAD DEFIB STERILE

## (undated) DEVICE — SEE MEDLINE ITEM 146292

## (undated) DEVICE — NDL HYPO A BEVEL 22X1 1/2

## (undated) DEVICE — SUT ETHIBOND XTRA 2-0 SH-2

## (undated) DEVICE — CATH INFANT VENT 16FR

## (undated) DEVICE — Device

## (undated) DEVICE — SUT 2/0 30IN ETHIBOND

## (undated) DEVICE — BLADE ELECTRO EDGE INSULATED

## (undated) DEVICE — PAD K-THERMIA 24IN X 60IN

## (undated) DEVICE — BLADE 4IN EDGE INSULATED

## (undated) DEVICE — SUT 6 18IN STEEL MONO CCS

## (undated) DEVICE — BLADE SAW STERNAL 5/BX

## (undated) DEVICE — RETRACTOR OCTOBASE INSERT HOLD

## (undated) DEVICE — SEE MEDLINE ITEM 157117

## (undated) DEVICE — SET DECANTER MEDICHOICE

## (undated) DEVICE — CANNULA 29/29FR VACUUM ASSIST

## (undated) DEVICE — SUT 2/0 36IN COATED VICRYL

## (undated) DEVICE — TRAY HEART

## (undated) DEVICE — SUT SILK 2-0 SH 18IN BLACK

## (undated) DEVICE — HEMOSTAT SURGICEL 4X8IN

## (undated) DEVICE — SUT PROLENE 3-0 SH DA 36 BL

## (undated) DEVICE — SUT PROLENE 4-0 SH BLU 36IN

## (undated) DEVICE — SYR 30CC LUER LOCK

## (undated) DEVICE — SEE MEDLINE ITEM 152487

## (undated) DEVICE — CANNULA RETROGRADE CARDIOPLEG

## (undated) DEVICE — LOOP VESSEL BLUE MAXI

## (undated) DEVICE — SUT PROLENE 5-0 24 C-1 BL

## (undated) DEVICE — DRAPE SLUSH WARMER WITH DISC

## (undated) DEVICE — SOL NACL 0.9% INJ 500ML BG

## (undated) DEVICE — WIRE INTRAMYOCARDIAL TEMP

## (undated) DEVICE — DRAPE INCISE IOBAN 2 23X17IN